# Patient Record
Sex: MALE | Race: WHITE | Employment: OTHER | URBAN - METROPOLITAN AREA
[De-identification: names, ages, dates, MRNs, and addresses within clinical notes are randomized per-mention and may not be internally consistent; named-entity substitution may affect disease eponyms.]

---

## 2018-07-03 ENCOUNTER — APPOINTMENT (OUTPATIENT)
Dept: GENERAL RADIOLOGY | Age: 68
End: 2018-07-03
Payer: MEDICARE

## 2018-07-03 ENCOUNTER — HOSPITAL ENCOUNTER (EMERGENCY)
Age: 68
Discharge: ANOTHER HOSPITAL | End: 2018-07-03
Attending: EMERGENCY MEDICINE
Payer: MEDICARE

## 2018-07-03 ENCOUNTER — HOSPITAL ENCOUNTER (INPATIENT)
Age: 68
LOS: 4 days | Discharge: HOME HEALTH | DRG: 039 | End: 2018-07-07
Attending: INTERNAL MEDICINE | Admitting: INTERNAL MEDICINE
Payer: MEDICARE

## 2018-07-03 ENCOUNTER — HOSPITAL ENCOUNTER (OUTPATIENT)
Age: 68
Discharge: HOME | End: 2018-07-03
Payer: MEDICARE

## 2018-07-03 ENCOUNTER — APPOINTMENT (OUTPATIENT)
Dept: CT IMAGING | Age: 68
End: 2018-07-03
Payer: MEDICARE

## 2018-07-03 VITALS
WEIGHT: 165 LBS | SYSTOLIC BLOOD PRESSURE: 119 MMHG | OXYGEN SATURATION: 96 % | BODY MASS INDEX: 26.52 KG/M2 | DIASTOLIC BLOOD PRESSURE: 69 MMHG | TEMPERATURE: 98.6 F | HEART RATE: 65 BPM | RESPIRATION RATE: 16 BRPM | HEIGHT: 66 IN

## 2018-07-03 DIAGNOSIS — G45.9 TRANSIENT CEREBRAL ISCHEMIA, UNSPECIFIED TYPE: Primary | ICD-10-CM

## 2018-07-03 DIAGNOSIS — I35.0 AORTIC VALVE STENOSIS, ETIOLOGY OF CARDIAC VALVE DISEASE UNSPECIFIED: ICD-10-CM

## 2018-07-03 LAB
ANION GAP SERPL CALCULATED.3IONS-SCNC: 11 MMOL/L (ref 7–16)
BUN BLDV-MCNC: 22 MG/DL (ref 8–23)
CALCIUM SERPL-MCNC: 9.4 MG/DL (ref 8.6–10.2)
CHLORIDE BLD-SCNC: 103 MMOL/L (ref 98–107)
CO2: 27 MMOL/L (ref 22–29)
CREAT SERPL-MCNC: 1.4 MG/DL (ref 0.7–1.2)
EKG ATRIAL RATE: 82 BPM
EKG P AXIS: 32 DEGREES
EKG P-R INTERVAL: 114 MS
EKG Q-T INTERVAL: 378 MS
EKG QRS DURATION: 92 MS
EKG QTC CALCULATION (BAZETT): 441 MS
EKG R AXIS: 72 DEGREES
EKG T AXIS: 60 DEGREES
EKG VENTRICULAR RATE: 82 BPM
GFR AFRICAN AMERICAN: >60
GFR NON-AFRICAN AMERICAN: 50 ML/MIN/1.73
GLUCOSE BLD-MCNC: 96 MG/DL (ref 74–109)
HCT VFR BLD CALC: 44.9 % (ref 37–54)
HEMOGLOBIN: 14.8 G/DL (ref 12.5–16.5)
MCH RBC QN AUTO: 30.3 PG (ref 26–35)
MCHC RBC AUTO-ENTMCNC: 33 % (ref 32–34.5)
MCV RBC AUTO: 92 FL (ref 80–99.9)
PDW BLD-RTO: 13.2 FL (ref 11.5–15)
PLATELET # BLD: 136 E9/L (ref 130–450)
PMV BLD AUTO: 11.9 FL (ref 7–12)
POTASSIUM SERPL-SCNC: 4 MMOL/L (ref 3.5–5)
PRO-BNP: 506 PG/ML (ref 0–125)
RBC # BLD: 4.88 E12/L (ref 3.8–5.8)
SODIUM BLD-SCNC: 141 MMOL/L (ref 132–146)
WBC # BLD: 9.4 E9/L (ref 4.5–11.5)

## 2018-07-03 PROCEDURE — 84100 ASSAY OF PHOSPHORUS: CPT

## 2018-07-03 PROCEDURE — 71045 X-RAY EXAM CHEST 1 VIEW: CPT

## 2018-07-03 PROCEDURE — 83036 HEMOGLOBIN GLYCOSYLATED A1C: CPT

## 2018-07-03 PROCEDURE — 85027 COMPLETE CBC AUTOMATED: CPT

## 2018-07-03 PROCEDURE — 70450 CT HEAD/BRAIN W/O DYE: CPT

## 2018-07-03 PROCEDURE — 84439 ASSAY OF FREE THYROXINE: CPT

## 2018-07-03 PROCEDURE — 83880 ASSAY OF NATRIURETIC PEPTIDE: CPT

## 2018-07-03 PROCEDURE — 84484 ASSAY OF TROPONIN QUANT: CPT

## 2018-07-03 PROCEDURE — 99285 EMERGENCY DEPT VISIT HI MDM: CPT

## 2018-07-03 PROCEDURE — 80048 BASIC METABOLIC PNL TOTAL CA: CPT

## 2018-07-03 PROCEDURE — 82746 ASSAY OF FOLIC ACID SERUM: CPT

## 2018-07-03 PROCEDURE — A0426 ALS 1: HCPCS

## 2018-07-03 PROCEDURE — 2060000000 HC ICU INTERMEDIATE R&B

## 2018-07-03 PROCEDURE — 36415 COLL VENOUS BLD VENIPUNCTURE: CPT

## 2018-07-03 PROCEDURE — 93005 ELECTROCARDIOGRAM TRACING: CPT | Performed by: EMERGENCY MEDICINE

## 2018-07-03 PROCEDURE — A0425 GROUND MILEAGE: HCPCS

## 2018-07-03 PROCEDURE — 82607 VITAMIN B-12: CPT

## 2018-07-03 RX ORDER — AMLODIPINE BESYLATE 5 MG/1
1 TABLET ORAL DAILY
Status: ON HOLD | COMMUNITY
Start: 2018-06-15 | End: 2018-07-07 | Stop reason: HOSPADM

## 2018-07-03 RX ORDER — ASPIRIN 81 MG/1
81 TABLET, CHEWABLE ORAL DAILY
Status: DISCONTINUED | OUTPATIENT
Start: 2018-07-04 | End: 2018-07-07 | Stop reason: HOSPADM

## 2018-07-03 RX ORDER — PRAVASTATIN SODIUM 40 MG
1 TABLET ORAL DAILY
Status: ON HOLD | COMMUNITY
Start: 2018-06-21 | End: 2018-07-07 | Stop reason: HOSPADM

## 2018-07-03 RX ORDER — RAMIPRIL 5 MG/1
10 CAPSULE ORAL DAILY
Status: DISCONTINUED | OUTPATIENT
Start: 2018-07-04 | End: 2018-07-07 | Stop reason: HOSPADM

## 2018-07-03 RX ORDER — RAMIPRIL 10 MG/1
1 CAPSULE ORAL DAILY
COMMUNITY
Start: 2018-04-10 | End: 2019-12-09 | Stop reason: SDUPTHER

## 2018-07-03 RX ORDER — ONDANSETRON 2 MG/ML
4 INJECTION INTRAMUSCULAR; INTRAVENOUS EVERY 6 HOURS PRN
Status: DISCONTINUED | OUTPATIENT
Start: 2018-07-03 | End: 2018-07-07 | Stop reason: HOSPADM

## 2018-07-03 RX ORDER — PANTOPRAZOLE SODIUM 40 MG/1
40 TABLET, DELAYED RELEASE ORAL
Status: DISCONTINUED | OUTPATIENT
Start: 2018-07-04 | End: 2018-07-07 | Stop reason: HOSPADM

## 2018-07-03 RX ORDER — ASPIRIN 325 MG
325 TABLET ORAL DAILY
COMMUNITY
End: 2020-07-22

## 2018-07-03 RX ORDER — METOPROLOL SUCCINATE 50 MG/1
1 TABLET, EXTENDED RELEASE ORAL 2 TIMES DAILY
COMMUNITY
Start: 2018-04-10 | End: 2019-10-03 | Stop reason: SDUPTHER

## 2018-07-03 RX ORDER — AMLODIPINE BESYLATE 10 MG/1
10 TABLET ORAL DAILY
Status: DISCONTINUED | OUTPATIENT
Start: 2018-07-04 | End: 2018-07-07 | Stop reason: HOSPADM

## 2018-07-03 RX ORDER — HYDRALAZINE HYDROCHLORIDE 20 MG/ML
5 INJECTION INTRAMUSCULAR; INTRAVENOUS EVERY 6 HOURS PRN
Status: DISCONTINUED | OUTPATIENT
Start: 2018-07-03 | End: 2018-07-07 | Stop reason: HOSPADM

## 2018-07-03 RX ORDER — NITROGLYCERIN 0.4 MG/1
0.4 TABLET SUBLINGUAL EVERY 5 MIN PRN
Status: DISCONTINUED | OUTPATIENT
Start: 2018-07-03 | End: 2018-07-07 | Stop reason: HOSPADM

## 2018-07-03 RX ORDER — PRAVASTATIN SODIUM 20 MG
40 TABLET ORAL DAILY
Status: DISCONTINUED | OUTPATIENT
Start: 2018-07-04 | End: 2018-07-04

## 2018-07-03 RX ORDER — METOPROLOL SUCCINATE 50 MG/1
50 TABLET, EXTENDED RELEASE ORAL 2 TIMES DAILY
Status: DISCONTINUED | OUTPATIENT
Start: 2018-07-03 | End: 2018-07-07 | Stop reason: HOSPADM

## 2018-07-03 ASSESSMENT — PAIN SCALES - GENERAL
PAINLEVEL_OUTOF10: 0
PAINLEVEL_OUTOF10: 0

## 2018-07-03 NOTE — PROGRESS NOTES
[address] @ 0852, per Dr. Mary Crowell. Request transfer to [address] for TIA and Aortic Stenosis. Requested Medicine. Dr. Lindsey Queen called via  OF THE [facility] @ 5487. Dr. Mary Crowell spoke to him @ this time.

## 2018-07-04 ENCOUNTER — APPOINTMENT (OUTPATIENT)
Dept: ULTRASOUND IMAGING | Age: 68
DRG: 039 | End: 2018-07-04
Attending: INTERNAL MEDICINE
Payer: MEDICARE

## 2018-07-04 LAB
ALBUMIN SERPL-MCNC: 3.7 G/DL (ref 3.5–5.2)
ALP BLD-CCNC: 73 U/L (ref 40–129)
ALT SERPL-CCNC: 11 U/L (ref 0–40)
ANION GAP SERPL CALCULATED.3IONS-SCNC: 14 MMOL/L (ref 7–16)
AST SERPL-CCNC: 15 U/L (ref 0–39)
BASOPHILS ABSOLUTE: 0.04 E9/L (ref 0–0.2)
BASOPHILS RELATIVE PERCENT: 0.5 % (ref 0–2)
BILIRUB SERPL-MCNC: 0.6 MG/DL (ref 0–1.2)
BUN BLDV-MCNC: 24 MG/DL (ref 8–23)
CALCIUM SERPL-MCNC: 9.1 MG/DL (ref 8.6–10.2)
CHLORIDE BLD-SCNC: 104 MMOL/L (ref 98–107)
CHOLESTEROL, TOTAL: 167 MG/DL (ref 0–199)
CO2: 24 MMOL/L (ref 22–29)
CREAT SERPL-MCNC: 1.4 MG/DL (ref 0.7–1.2)
EOSINOPHILS ABSOLUTE: 0.13 E9/L (ref 0.05–0.5)
EOSINOPHILS RELATIVE PERCENT: 1.6 % (ref 0–6)
FOLATE: 6.1 NG/ML (ref 4.8–24.2)
GFR AFRICAN AMERICAN: >60
GFR NON-AFRICAN AMERICAN: 50 ML/MIN/1.73
GLUCOSE BLD-MCNC: 94 MG/DL (ref 74–109)
HBA1C MFR BLD: 5.4 % (ref 4–5.6)
HCT VFR BLD CALC: 43.9 % (ref 37–54)
HDLC SERPL-MCNC: 51 MG/DL
HEMOGLOBIN: 14.6 G/DL (ref 12.5–16.5)
IMMATURE GRANULOCYTES #: 0.08 E9/L
IMMATURE GRANULOCYTES %: 1 % (ref 0–5)
LDL CHOLESTEROL CALCULATED: 96 MG/DL (ref 0–99)
LYMPHOCYTES ABSOLUTE: 1.6 E9/L (ref 1.5–4)
LYMPHOCYTES RELATIVE PERCENT: 19.5 % (ref 20–42)
MAGNESIUM: 2.2 MG/DL (ref 1.6–2.6)
MCH RBC QN AUTO: 30 PG (ref 26–35)
MCHC RBC AUTO-ENTMCNC: 33.3 % (ref 32–34.5)
MCV RBC AUTO: 90.1 FL (ref 80–99.9)
MONOCYTES ABSOLUTE: 0.58 E9/L (ref 0.1–0.95)
MONOCYTES RELATIVE PERCENT: 7.1 % (ref 2–12)
NEUTROPHILS ABSOLUTE: 5.76 E9/L (ref 1.8–7.3)
NEUTROPHILS RELATIVE PERCENT: 70.3 % (ref 43–80)
PDW BLD-RTO: 13 FL (ref 11.5–15)
PHOSPHORUS: 3.9 MG/DL (ref 2.5–4.5)
PLATELET # BLD: 123 E9/L (ref 130–450)
PMV BLD AUTO: 11.5 FL (ref 7–12)
POTASSIUM SERPL-SCNC: 4.5 MMOL/L (ref 3.5–5)
PRO-BNP: 585 PG/ML (ref 0–125)
RBC # BLD: 4.87 E12/L (ref 3.8–5.8)
SODIUM BLD-SCNC: 142 MMOL/L (ref 132–146)
T4 FREE: 1.05 NG/DL (ref 0.93–1.7)
TOTAL PROTEIN: 6.8 G/DL (ref 6.4–8.3)
TRIGL SERPL-MCNC: 99 MG/DL (ref 0–149)
TROPONIN: <0.01 NG/ML (ref 0–0.03)
TROPONIN: <0.01 NG/ML (ref 0–0.03)
TSH SERPL DL<=0.05 MIU/L-ACNC: 2.54 UIU/ML (ref 0.27–4.2)
VITAMIN B-12: 185 PG/ML (ref 211–946)
VLDLC SERPL CALC-MCNC: 20 MG/DL
WBC # BLD: 8.2 E9/L (ref 4.5–11.5)

## 2018-07-04 PROCEDURE — 85025 COMPLETE CBC W/AUTO DIFF WBC: CPT

## 2018-07-04 PROCEDURE — 83735 ASSAY OF MAGNESIUM: CPT

## 2018-07-04 PROCEDURE — 83880 ASSAY OF NATRIURETIC PEPTIDE: CPT

## 2018-07-04 PROCEDURE — G8988 SELF CARE GOAL STATUS: HCPCS

## 2018-07-04 PROCEDURE — 99221 1ST HOSP IP/OBS SF/LOW 40: CPT | Performed by: PSYCHIATRY & NEUROLOGY

## 2018-07-04 PROCEDURE — 6370000000 HC RX 637 (ALT 250 FOR IP): Performed by: INTERNAL MEDICINE

## 2018-07-04 PROCEDURE — 36415 COLL VENOUS BLD VENIPUNCTURE: CPT

## 2018-07-04 PROCEDURE — 2060000000 HC ICU INTERMEDIATE R&B

## 2018-07-04 PROCEDURE — 6360000002 HC RX W HCPCS: Performed by: INTERNAL MEDICINE

## 2018-07-04 PROCEDURE — 80053 COMPREHEN METABOLIC PANEL: CPT

## 2018-07-04 PROCEDURE — 97165 OT EVAL LOW COMPLEX 30 MIN: CPT

## 2018-07-04 PROCEDURE — G8987 SELF CARE CURRENT STATUS: HCPCS

## 2018-07-04 PROCEDURE — 93880 EXTRACRANIAL BILAT STUDY: CPT

## 2018-07-04 PROCEDURE — 80061 LIPID PANEL: CPT

## 2018-07-04 PROCEDURE — 84443 ASSAY THYROID STIM HORMONE: CPT

## 2018-07-04 PROCEDURE — 84484 ASSAY OF TROPONIN QUANT: CPT

## 2018-07-04 RX ORDER — FOLIC ACID 1 MG/1
1 TABLET ORAL DAILY
Status: DISCONTINUED | OUTPATIENT
Start: 2018-07-04 | End: 2018-07-07 | Stop reason: HOSPADM

## 2018-07-04 RX ORDER — ATORVASTATIN CALCIUM 40 MG/1
80 TABLET, FILM COATED ORAL NIGHTLY
Status: DISCONTINUED | OUTPATIENT
Start: 2018-07-04 | End: 2018-07-07 | Stop reason: HOSPADM

## 2018-07-04 RX ORDER — CYANOCOBALAMIN 1000 UG/ML
1000 INJECTION INTRAMUSCULAR; SUBCUTANEOUS
Status: DISCONTINUED | OUTPATIENT
Start: 2018-07-04 | End: 2018-07-07 | Stop reason: HOSPADM

## 2018-07-04 RX ORDER — LORAZEPAM 2 MG/ML
1 INJECTION INTRAMUSCULAR ONCE
Status: DISCONTINUED | OUTPATIENT
Start: 2018-07-04 | End: 2018-07-07

## 2018-07-04 RX ORDER — CLOPIDOGREL BISULFATE 75 MG/1
75 TABLET ORAL DAILY
Status: DISCONTINUED | OUTPATIENT
Start: 2018-07-04 | End: 2018-07-05

## 2018-07-04 RX ADMIN — FOLIC ACID 1 MG: 1 TABLET ORAL at 12:42

## 2018-07-04 RX ADMIN — RAMIPRIL 10 MG: 5 CAPSULE ORAL at 09:00

## 2018-07-04 RX ADMIN — AMLODIPINE BESYLATE 10 MG: 10 TABLET ORAL at 09:00

## 2018-07-04 RX ADMIN — ASPIRIN 81 MG 81 MG: 81 TABLET ORAL at 09:01

## 2018-07-04 RX ADMIN — DESMOPRESSIN ACETATE 80 MG: 0.2 TABLET ORAL at 20:31

## 2018-07-04 RX ADMIN — CYANOCOBALAMIN 1000 MCG: 1000 INJECTION, SOLUTION INTRAMUSCULAR at 18:05

## 2018-07-04 RX ADMIN — CLOPIDOGREL BISULFATE 75 MG: 75 TABLET ORAL at 12:42

## 2018-07-04 RX ADMIN — METOPROLOL SUCCINATE 50 MG: 50 TABLET, FILM COATED, EXTENDED RELEASE ORAL at 09:00

## 2018-07-04 RX ADMIN — PRAVASTATIN SODIUM 40 MG: 20 TABLET ORAL at 09:00

## 2018-07-04 RX ADMIN — METOPROLOL SUCCINATE 50 MG: 50 TABLET, FILM COATED, EXTENDED RELEASE ORAL at 00:08

## 2018-07-04 RX ADMIN — PANTOPRAZOLE SODIUM 40 MG: 40 TABLET, DELAYED RELEASE ORAL at 06:23

## 2018-07-04 RX ADMIN — METOPROLOL SUCCINATE 50 MG: 50 TABLET, FILM COATED, EXTENDED RELEASE ORAL at 20:31

## 2018-07-04 ASSESSMENT — PAIN SCALES - GENERAL
PAINLEVEL_OUTOF10: 0

## 2018-07-04 NOTE — H&P
age 51, pulmonary embolus. Father   age 61 of a CVA. MEDICATIONS AT THE TIME OF ADMISSION:  Include amlodipine, aspirin, Toprol,  pravastatin, and Altace. REVIEW OF SYSTEMS:  The abovementioned history of coronary disease,  osteoarthritis, elevated lipids, hypertension. Denies any recurring  breathing difficulties, emphysema, COPD, and bronchitis. Denies any chest  pain or palpitations. Denies any recurring epigastric pains, gastritis,  ulcers, gallbladder difficulties. Denies any blood in stool, blood in  urine. Mild arthritic difficulties as listed. PHYSICAL EXAMINATION:  GENERAL:  A 57-year-old male sitting on the side of the bed. No acute  distress. VITAL SIGNS:  Temperature is 98.5, pulse 74, respirations 18, blood  pressure 110/68. HEENT:  Pupils are equally round and reactive to light. Extraocular  muscles intact. Sclerae white. Conjunctivae pink. Ears, nose, throat,  negative for discharge, drainage, or deformity. NECK:  Supple, soft bruits on the left, equivocal bruits on the right. No  masses, megaly. Trachea midline. Thyroid negative. HEART:  Regular rate and rhythm. Grade 2/6 systolic murmur, best heard  second right intercostal space but also second left intercostal space. LUNGS:  Clear to auscultation bilaterally. ABDOMEN:  Soft, nontender. No masses, megaly, rebound, or guarding. Liver  and spleen negative. Bowel sounds are normal.  EXTREMITIES:  Peripheral pulses diminished. There is no cyanosis,  clubbing, deformity, or edema. He is able to move all four extremities  equally. There is no appreciable weakness. Ultrasound of carotids just completed reveals 0% to 49% right carotid, 90%  to 99% left carotid disease. LABORATORY DATA:  Sodium 142, potassium 4.5, chloride 104, CO2 of 24, BUN  24, creatinine 1.4, magnesium 2.2, glucose 94, calcium 9.1. Protein is  6.8. Albumin 3.7. Pro-BNP is slightly high at 568. LDL is 96.   TSH is  normal.  A1c is 5.4.  Hemoglobin is 14.6. White count 8.2. Platelet  slightly low at 123. B12 level is low at 185. Folic acid is barely normal  at 6.1. EKG sinus rhythm. ASSESSMENT:  1. TIA. 2.  Left carotid disease. 3.  B12 deficiency. 4.  Folate deficiency. 5.  Hypertension. 6.  Tobacco abuse. PLAN:  1. Neurology consult. 2.  DVT prophylaxis. 3.  MRI of brain to be done. 4.  Vascular consult. 5.  Cardiology consult and 2-D echo regarding aortic stenosis.         Genie Carreon DO    D: 07/04/2018 11:39:45       T: 07/04/2018 12:41:50     [email or web address]  Job#: [id number]     Doc#: [id number]    CC:

## 2018-07-04 NOTE — ED PROVIDER NOTES
HPI:  7/3/18, Time: 8:03 PM         Laura Denise is a 70 y.o. male presenting to the ED for an episode of dysarthria, beginning a few hours ago. The complaint has been intermittent, mild in severity, and worsened by nothing. Patient states that episode lasted less than 5 minutes. He denies any other associated complaints no visual changes difficulty swallowing no pain. The patient's wife did witness the event and stated that he clearly had dysarthria but did not notice any other focal neurologic deficit    ROS:   Pertinent positives and negatives are stated within HPI, all other systems reviewed and are negative  --------------------------------------------- PAST HISTORY ---------------------------------------------  Past Medical History:  has a past medical history of Hypertension. Past Surgical History:  has a past surgical history that includes Cardiac surgery. Social History:  reports that he has been smoking. He has never used smokeless tobacco. He reports that he does not drink alcohol or use drugs. Family History: family history is not on file. The patients home medications have been reviewed. Allergies: Patient has no known allergies. ---------------------------------------------------PHYSICAL EXAM--------------------------------------   Nursing note and vitals reviewed. Constitutional: He is oriented to person, place, and time. He appears well-developed and well-nourished. No distress. Head: Normocephalic and atraumatic. Nose: Nose normal.   Eyes: Conjunctivae and EOM are normal. Pupils are equal, round, and reactive to light. Throat:  Mucous membranes are moist.   Posterior pharynx is clear, no exudates. Neck: Normal range of motion. Neck supple. No JVD present. Lymphadenopathy: He has no cervical adenopathy. Cardiovascular: Normal rate, regular rhythm, normal heart sounds and intact distal pulses. Exam reveals no gallop and no friction rub.  4/6 systolic ejection murmur   Pulmonary/Chest: Effort normal and breath sounds normal. No respiratory distress. Hehas no wheezes. Abdominal: Soft. Bowel sounds are normal. No tenderness. He has no rebound and no guarding. Musculoskeletal: Normal range of motion. Right lower leg: He exhibits no tenderness and no swelling. Left lower leg: He exhibits no tenderness and no swelling. Neurological: He is alert and oriented to person, place, and time. He has normal strength. No cranial nerve deficit or sensory deficit. Skin: Skin is warm and dry. No rash noted. Psychiatric: He has a normal mood and affect. His behavior is normal.       -------------------------------------------------- RESULTS -------------------------------------------------  I have personally reviewed all laboratory and imaging results for this patient. Results are listed below.      LABS:  Results for orders placed or performed during the hospital encounter of 07/03/18   CBC   Result Value Ref Range    WBC 9.4 4.5 - 11.5 E9/L    RBC 4.88 3.80 - 5.80 E12/L    Hemoglobin 14.8 12.5 - 16.5 g/dL    Hematocrit 44.9 37.0 - 54.0 %    MCV 92.0 80.0 - 99.9 fL    MCH 30.3 26.0 - 35.0 pg    MCHC 33.0 32.0 - 34.5 %    RDW 13.2 11.5 - 15.0 fL    Platelets 564 467 - 718 E9/L    MPV 11.9 7.0 - 12.0 fL   Basic Metabolic Panel   Result Value Ref Range    Sodium 141 132 - 146 mmol/L    Potassium 4.0 3.5 - 5.0 mmol/L    Chloride 103 98 - 107 mmol/L    CO2 27 22 - 29 mmol/L    Anion Gap 11 7 - 16 mmol/L    Glucose 96 74 - 109 mg/dL    BUN 22 8 - 23 mg/dL    CREATININE 1.4 (H) 0.7 - 1.2 mg/dL    GFR Non-African American 50 >=60 mL/min/1.73    GFR African American >60     Calcium 9.4 8.6 - 10.2 mg/dL   Brain Natriuretic Peptide   Result Value Ref Range    Pro- (H) 0 - 125 pg/mL   EKG 12 Lead   Result Value Ref Range    Ventricular Rate 82 BPM    Atrial Rate 82 BPM    P-R Interval 114 ms    QRS Duration 92 ms    Q-T Interval 378 ms    QTc Calculation (Bazett) 441 provider has spoken with the patient and spouse/SO and discussed todays results, in addition to providing specific details for the plan of care and counseling regarding the diagnosis and prognosis. Questions are answered at this time and they are agreeable with the plan.       --------------------------------- IMPRESSION AND DISPOSITION ---------------------------------    IMPRESSION  1. Transient cerebral ischemia, unspecified type    2. Aortic valve stenosis, etiology of cardiac valve disease unspecified        DISPOSITION  Disposition: Transfer to [facility] to have further eval  Patient condition is serious        NOTE: This report was transcribed using voice recognition software.  Every effort was made to ensure accuracy; however, inadvertent computerized transcription errors may be present            Eliza Diggs MD  07/03/18 2007

## 2018-07-04 NOTE — CONSULTS
Labs:  CBC:   Recent Labs      18   1756  18   0636   WBC  9.4  8.2   HGB  14.8  14.6   PLT  136  123*     BMP:  Recent Labs      18   1756  18   0636   NA  141  142   K  4.0  4.5   CL  103  104   CO2  27  24   BUN  22  24*   CREATININE  1.4*  1.4*   GLUCOSE  96  94   LABGLOM  50  50     ABGs: No results found for: PH, PO2, PCO2  INR: No results for input(s): INR in the last 72 hours. PRO-BNP:   Lab Results   Component Value Date    PROBNP 585 (H) 2018    PROBNP 506 (H) 2018      Cardiac Injury Profile:   Recent Labs      18   2327  18   0636   TROPONINI  <0.01  <0.01      Lipid Profile:   Lab Results   Component Value Date    TRIG 99 2018    HDL 51 2018    LDLCALC 96 2018    CHOL 167 2018      Hemoglobin A1C: No components found for: HGBA1C   ECG:  See report    Radiology:  Ct Head Wo Contrast    Result Date: 7/3/2018  Patient MRN:  [id number] : 1950 Age: 70 years Gender: Male Order Date:  7/3/2018 5:15 PM EXAM: CT HEAD WO CONTRAST NUMBER OF IMAGES:  193 INDICATION:  Slurred speech, aphasia  COMPARISON: None TECHNIQUE: CT scan of the head was performed from the vertex through the posterior fossa. No  IV contrast was administered. Coronal and sagittal reconstructions were also reviewed. Low-dose CT  acquisition technique included one of following options; 1 . Automated exposure control, 2. Adjustment of MA and or KV according to patient's size or 3. Use of iterative reconstruction. FINDINGS: There is mild generalized cerebral atrophy with mild to moderate periventricular and deep subcortical white matter low-attenuation indicating chronic microvascular ischemic disease. Small foci of ill-defined low attenuation are seen within the centrum semiovale in the left frontal lobe and in the left frontal subcortical white matter suggesting remote lacunar infarcts. There is mild generalized cerebellar atrophy.  No intracerebral hemorrhage or extra-axial fluid collections are seen. There is no hydrocephalus. There is no mass effect, midline shift or sulcal effacement. There is moderate calcification of the carotid siphons. The bony calvarium appears intact. The visualized paranasal sinuses and mastoid air cells cells are clear. Note is made of a Roel cell on the left. CONCLUSION: 1. No acute intracranial abnormality seen. If an acute ischemic infarct is suspected, follow-up MRI with diffusion imaging is recommended for further evaluation. 2. Mild generalized cerebral atrophy with mild to moderate chronic microvascular ischemic changes involving the deep white matter zones and small old appearing lacunar infarcts involving the left frontal lobe white matter zones. 3. Mild generalized cerebellar atrophy. 4. Moderate calcification of the carotid siphons. Xr Chest Portable    Result Date: 7/3/2018  Patient MRN:  [id number] : 1950 Age: 70 years Gender: Male Order Date:  7/3/2018 6:00 PM EXAM: XR CHEST PORTABLE INDICATION:  Possible Stroke Possible Stroke COMPARISON: None NUMBER OF IMAGES:  1 FINDINGS:  AP upright portable view of the chest was obtained. No pulmonary consolidation, pleural effusion or pneumothorax is seen. The heart is mildly enlarged. There are median sternotomy sutures in place. No mediastinal or hilar enlargement is seen. The visible bony structures appear intact. CONCLUSION:  Mild cardiomegaly with evidence of previous median sternotomy. No evidence of an acute cardiopulmonary process. Us Carotid Artery Bilateral    Result Date: 2018  Patient MRN:  [id number] : 1950 Age: 70 years Gender: Male Order Date:  2018 8:00 AM EXAM: US CAROTID ARTERY BILATERAL NUMBER OF IMAGES:  68 INDICATION: Stroke COMPARISON: None FINDINGS: Velocities are elevated on the left ICA\CCA ratios are elevated on the left  The right vertebral artery doppler images demonstrate  antegrade flow.  The left vertebral artery doppler images

## 2018-07-04 NOTE — PLAN OF CARE
Problem: HEMODYNAMIC STATUS  Goal: Patient has stable vital signs and fluid balance    Intervention: Administer medications as ordered  Administered meds to pt without issues

## 2018-07-04 NOTE — PLAN OF CARE
Problem: Falls - Risk of:  Goal: Will remain free from falls  Will remain free from falls   Outcome: Met This Shift    Goal: Absence of physical injury  Absence of physical injury   Outcome: Met This Shift      Problem: HEMODYNAMIC STATUS  Goal: Patient has stable vital signs and fluid balance  Outcome: Met This Shift      Problem: ACTIVITY INTOLERANCE/IMPAIRED MOBILITY  Goal: Mobility/activity is maintained at optimum level for patient  Outcome: Met This Shift

## 2018-07-04 NOTE — PROGRESS NOTES
Occupational Therapy  OCCUPATIONAL THERAPY INITIAL EVALUATION      Date:2018  Patient Name: Mary Gonsalez  MRN: [id number]  : 1950  Room: [address] [place], [place] [address]    Evaluating OT: Pop Santos OTR/L [id number]  Recommended Adaptive Equipment:none  AM PAC Daily activity raw score:    Diagnosis: dysarthria  Past Medical History: HTN, CAD, HLD & hx CABG   Precautions:     Home Living: Pt lives wife in a 1-story with 3steps to enter and no rails; bed/bath on living level  Bathroom setup: tub-curtain  Equipment owned: none   Basement  with 10 steps and 1 hand rail  Comments: dog    Prior Level of Function: independent with ADLs independent with IADLs; using independent for ambulation. Driving: yes  Occupation: retired    Pain Level: pt c/o no pain with activity     Cognition: oriented x 3; follows 3step directions. Additional Comments: Pt is cognitively intact    Sensory:   Hearing: WFL  Vision: impaired   Glasses: yes [x] no [] reading []      UE Assessment:  Hand Dominance: Right [x]  Left []     ROM  Strength Additional Info:    BUE  BUE WFL 5/5 strength G  and G FMC/dexterity noted during ADL tasks             Sensation: WFL    Tone:  Grossly intact    Edema: none noted    Functional Assessment:   Assessment Status  Comments   Feeding  independent    Grooming  independent    Upper Body Dressing independent    Lower Body Dressing independent    Bathing independent    Toileting  NT    Bed Mobility  Supine to Sit: independent  Sit to Supine:independent    Functional Transfers independent    Functional Mobility independent      Sit balance: G  Stand balance: G  Endurance/Activity tolerance: G                              Comments: Upon arrival pt standing @ bed level. At end of session pt seated EOB with all devices within reach, all lines and tubes intact.       Treatment: Pt is functionally independent & has no further OT needs @ this time     Assessment of current deficits   Functional mobility []  ADLs

## 2018-07-05 ENCOUNTER — ANESTHESIA EVENT (OUTPATIENT)
Dept: OPERATING ROOM | Age: 68
DRG: 039 | End: 2018-07-05
Payer: MEDICARE

## 2018-07-05 PROBLEM — I65.22 SYMPTOMATIC STENOSIS OF LEFT CAROTID ARTERY WITHOUT INFARCTION: Status: ACTIVE | Noted: 2018-07-05

## 2018-07-05 LAB
ABO/RH: NORMAL
ANTIBODY SCREEN: NORMAL
LEFT VENTRICULAR EJECTION FRACTION MODE: NORMAL
LV EF: 63 %
LV EF: 63 %
LVEF MODALITY: NORMAL

## 2018-07-05 PROCEDURE — 99223 1ST HOSP IP/OBS HIGH 75: CPT | Performed by: SURGERY

## 2018-07-05 PROCEDURE — 93306 TTE W/DOPPLER COMPLETE: CPT

## 2018-07-05 PROCEDURE — 86900 BLOOD TYPING SEROLOGIC ABO: CPT

## 2018-07-05 PROCEDURE — 86901 BLOOD TYPING SEROLOGIC RH(D): CPT

## 2018-07-05 PROCEDURE — 6360000002 HC RX W HCPCS: Performed by: INTERNAL MEDICINE

## 2018-07-05 PROCEDURE — 6370000000 HC RX 637 (ALT 250 FOR IP): Performed by: INTERNAL MEDICINE

## 2018-07-05 PROCEDURE — 2060000000 HC ICU INTERMEDIATE R&B

## 2018-07-05 PROCEDURE — 86850 RBC ANTIBODY SCREEN: CPT

## 2018-07-05 PROCEDURE — 87088 URINE BACTERIA CULTURE: CPT

## 2018-07-05 PROCEDURE — 36415 COLL VENOUS BLD VENIPUNCTURE: CPT

## 2018-07-05 RX ADMIN — PANTOPRAZOLE SODIUM 40 MG: 40 TABLET, DELAYED RELEASE ORAL at 06:00

## 2018-07-05 RX ADMIN — ASPIRIN 81 MG 81 MG: 81 TABLET ORAL at 08:20

## 2018-07-05 RX ADMIN — METOPROLOL SUCCINATE 50 MG: 50 TABLET, FILM COATED, EXTENDED RELEASE ORAL at 08:20

## 2018-07-05 RX ADMIN — RAMIPRIL 10 MG: 5 CAPSULE ORAL at 08:20

## 2018-07-05 RX ADMIN — DESMOPRESSIN ACETATE 80 MG: 0.2 TABLET ORAL at 20:23

## 2018-07-05 RX ADMIN — METOPROLOL SUCCINATE 50 MG: 50 TABLET, FILM COATED, EXTENDED RELEASE ORAL at 20:23

## 2018-07-05 RX ADMIN — CYANOCOBALAMIN 1000 MCG: 1000 INJECTION, SOLUTION INTRAMUSCULAR at 18:17

## 2018-07-05 RX ADMIN — FOLIC ACID 1 MG: 1 TABLET ORAL at 08:20

## 2018-07-05 RX ADMIN — AMLODIPINE BESYLATE 10 MG: 10 TABLET ORAL at 08:20

## 2018-07-05 ASSESSMENT — PAIN SCALES - GENERAL
PAINLEVEL_OUTOF10: 0

## 2018-07-05 ASSESSMENT — LIFESTYLE VARIABLES: SMOKING_STATUS: 1

## 2018-07-05 NOTE — PROGRESS NOTES
guarding  Extremities: No edema, Peripheral pulses palpable  Musculoskeletal: Muscular strength appears intact. Neuro:  No focal motor defects ; II-XII grossly intact . PAIGE equally    TELEMETRY: REVIEWED--Telemetry: Sinus    ASSESSMENT:   Principal Problem:    Symptomatic stenosis of left carotid artery without infarction  Active Problems:    TIA (transient ischemic attack)    Vitamin B 12 deficiency    Folic acid deficiency    Hypertension    Hyperlipidemia    Tobacco dependence  Resolved Problems:    * No resolved hospital problems. *      PLAN:  SEE ORDERS      RE  CHANGES AND FINDINGS   Medications reviewed with patient  GI prophylaxis  DVT prophylaxis  Consultants notes reviewed  Await cardiology consult  Await results of 2-D echo  Carotid endarterectomy to be done tomorrow  Continue cyanocobalamin injection  Continue Folvite by mouth  Continue Lipitor 80 mg daily      Discussed with patient and nursing. Lul Xiao DO     12:41 PM     7/5/2018    TIME >35 MINUTES    >  50 %  OF  TIME  DISCUSSION     Active Hospital Problems    Diagnosis    Symptomatic stenosis of left carotid artery without infarction [I65.22]    Vitamin B 12 deficiency [Q62.9]    Folic acid deficiency [W08.0]    Hypertension [I10]    Hyperlipidemia [E78.5]    Tobacco dependence [F17.200]    TIA (transient ischemic attack) [G45.9]                 ------------  INFORMATION  -----------      DIET:DIET CARDIAC;   Diet NPO, After Midnight      No Known Allergies      MEDICATION SIDE EFFECTS:none       SCHEDULED MEDS:                                 Current Facility-Administered Medications   Medication Dose Route Frequency Provider Last Rate Last Dose    ceFAZolin (ANCEF) 2 g in dextrose 3 % 50 mL IVPB (duplex)  2 g Intravenous See Admin Instructions Hershel Boeck, MD        cyanocobalamin injection 1,000 mcg  1,000 mcg Intramuscular Dinner Mason Xiao DO   1,000 mcg at 51/22/34 1135    folic acid (FOLVITE) tablet 24 07/04/2018    LABALBU 3.7 07/04/2018    CREATININE 1.4 07/04/2018    CALCIUM 9.1 07/04/2018    GFRAA >60 07/04/2018    LABGLOM 50 07/04/2018    GLUCOSE 94 07/04/2018     Hepatic Function Panel:    Lab Results   Component Value Date    ALKPHOS 73 07/04/2018    ALT 11 07/04/2018    AST 15 07/04/2018    PROT 6.8 07/04/2018    BILITOT 0.6 07/04/2018    LABALBU 3.7 07/04/2018     Magnesium:    Lab Results   Component Value Date    MG 2.2 07/04/2018     Phosphorus:    Lab Results   Component Value Date    PHOS 3.9 07/03/2018     Uric Acid:  No results found for: LABURIC, URICACID  PT/INR:  No results found for: PROTIME, INR  Warfarin PT/INR:  No components found for: PTPATWAR, PTINRWAR  PTT:  No results found for: APTT, PTT[APTT}  Troponin:    Lab Results   Component Value Date    TROPONINI <0.01 07/04/2018     Last 3 Troponin:    Lab Results   Component Value Date    TROPONINI <0.01 07/04/2018    TROPONINI <0.01 07/03/2018     U/A:  No results found for: NITRITE, COLORU, PROTEINU, PHUR, LABCAST, WBCUA, RBCUA, MUCUS, TRICHOMONAS, YEAST, BACTERIA, CLARITYU, SPECGRAV, LEUKOCYTESUR, UROBILINOGEN, BILIRUBINUR, BLOODU, GLUCOSEU, AMORPHOUS  HgBA1c:    Lab Results   Component Value Date    LABA1C 5.4 07/03/2018     FLP:    Lab Results   Component Value Date    TRIG 99 07/04/2018    HDL 51 07/04/2018    LDLCALC 96 07/04/2018    LABVLDL 20 07/04/2018     TSH:    Lab Results   Component Value Date    TSH 2.540 07/04/2018     VITAMIN B12: No components found for: B12  FOLATE:    Lab Results   Component Value Date    FOLATE 6.1 07/03/2018        CBC:  Recent Labs      07/03/18   1756  07/04/18   0636   WBC  9.4  8.2   RBC  4.88  4.87   HGB  14.8  14.6   HCT  44.9  43.9   PLT  136  123*   MCV  92.0  90.1   MCH  30.3  30.0   MCHC  33.0  33.3   RDW  13.2  13.0   LYMPHOPCT   --   19.5*   MONOPCT   --   7.1   BASOPCT   --   0.5   MONOSABS   --   0.58   LYMPHSABS   --   1.60   EOSABS   --   0.13   BASOSABS   --   0.04          H & H [G45.9]         Yasmeen Xiao DO   12:41 PM     7/5/2018

## 2018-07-05 NOTE — ANESTHESIA PRE PROCEDURE
 pantoprazole (PROTONIX) tablet 40 mg  40 mg Oral QAM AC David Rodriguezsalvador, DO   40 mg at 07/05/18 0600    ondansetron (ZOFRAN) injection 4 mg  4 mg Intravenous Q6H PRN Sue Schaumann Mihok, DO        nitroGLYCERIN (NITROSTAT) SL tablet 0.4 mg  0.4 mg Sublingual Q5 Min PRN Sue Schaumann Mihok, DO        aspirin chewable tablet 81 mg  81 mg Oral Daily David Brianaleksey, DO   81 mg at 07/05/18 0820    hydrALAZINE (APRESOLINE) injection 5 mg  5 mg Intravenous Q6H PRN Sue Schaumann Mihok, DO           Allergies:  No Known Allergies    Problem List:    Patient Active Problem List   Diagnosis Code    TIA (transient ischemic attack) G45.9    Symptomatic stenosis of left carotid artery without infarction I65.22    Vitamin B 12 deficiency S28.3    Folic acid deficiency G93.8    Hypertension I10    Hyperlipidemia E78.5    Tobacco dependence F17.200       Past Medical History:        Diagnosis Date    Arthritis     CAD (coronary artery disease)     Folic acid deficiency     Hyperlipidemia     Hypertension     Symptomatic stenosis of left carotid artery without infarction 7/5/2018    Tobacco dependence     Vitamin B 12 deficiency        Past Surgical History:        Procedure Laterality Date    CARDIAC SURGERY      CORONARY ARTERY BYPASS GRAFT  2008       Social History:    Social History   Substance Use Topics    Smoking status: Current Every Day Smoker     Packs/day: 1.50     Years: 50.00     Types: Cigarettes     Start date: 1968    Smokeless tobacco: Never Used      Comment: H does not want to quit.      Alcohol use No                                Ready to quit: No  Counseling given: Not Answered      Vital Signs (Current):   Vitals:    07/04/18 1640 07/04/18 1945 07/04/18 2338 07/05/18 0800   BP: (!) 123/59 (!) 116/56 125/66 124/63   Pulse: 64 67 66 72   Resp: 18 20 20 18   Temp: 36.4 °C (97.5 °F) 37.1 °C (98.8 °F) 36.7 °C (98.1 °F) 37.1 °C (98.7 °F)   TempSrc: Temporal Temporal Oral Temporal   SpO2: 99% 96% 98% 97% Weight:       Height:                                                  BP Readings from Last 3 Encounters:   07/05/18 124/63   07/03/18 119/69       NPO Status:  instructed to be NPO at midnight                                                                               BMI:   Wt Readings from Last 3 Encounters:   07/03/18 157 lb 6.4 oz (71.4 kg)   07/03/18 165 lb (74.8 kg)     Body mass index is 25.41 kg/m². CBC:   Lab Results   Component Value Date    WBC 8.2 07/04/2018    RBC 4.87 07/04/2018    HGB 14.6 07/04/2018    HCT 43.9 07/04/2018    MCV 90.1 07/04/2018    RDW 13.0 07/04/2018     07/04/2018       CMP:   Lab Results   Component Value Date     07/04/2018    K 4.5 07/04/2018     07/04/2018    CO2 24 07/04/2018    BUN 24 07/04/2018    CREATININE 1.4 07/04/2018    GFRAA >60 07/04/2018    LABGLOM 50 07/04/2018    GLUCOSE 94 07/04/2018    PROT 6.8 07/04/2018    CALCIUM 9.1 07/04/2018    BILITOT 0.6 07/04/2018    ALKPHOS 73 07/04/2018    AST 15 07/04/2018    ALT 11 07/04/2018       POC Tests: No results for input(s): POCGLU, POCNA, POCK, POCCL, POCBUN, POCHEMO, POCHCT in the last 72 hours. Coags: No results found for: PROTIME, INR, APTT    HCG (If Applicable): No results found for: PREGTESTUR, PREGSERUM, HCG, HCGQUANT     ABGs: No results found for: PHART, PO2ART, PZM8FOP, HAK8CTB, BEART, [id number]     Type & Screen (If Applicable):  No results found for: LABABO, [address]    Anesthesia Evaluation  Patient summary reviewed and Nursing notes reviewed no history of anesthetic complications:   Airway: Mallampati: III  TM distance: >3 FB   Neck ROM: full  Mouth opening: > = 3 FB Dental:          Pulmonary: breath sounds clear to auscultation  (+) current smoker (1ppd)    (-) shortness of breath          Patient did not smoke on day of surgery.                  Cardiovascular:  Exercise tolerance: good (>4 METS),   (+) hypertension: no interval change and moderate, valvular problems/murmurs: AS, CAD: no interval change, CABG/stent (2008, x3):, hyperlipidemia      ECG reviewed  Rhythm: regular  Rate: normal  Echocardiogram reviewed         Beta Blocker:  Dose within 24 Hrs         Neuro/Psych:   (+) TIA,             GI/Hepatic/Renal: Neg GI/Hepatic/Renal ROS            Endo/Other:    (+) : arthritis (right shoulder):., .                  ROS comment: Vitamin B 12 deficiency Abdominal:         (-) obese Abdomen: soft. Vascular:   + PVD, aortic or cerebral, . ROS comment: Symptomatic stenosis of left carotid artery without infarction. 7/4/2018 8:00 AM EXAM: US CAROTID ARTERY BILATERAL  Atherosclerotic disease. No hemodynamically significant stenosis is identified Estimated stenosis by NASCET criteria in the proximal right carotid artery is between 0% and 49%. Estimated stenosis by NASCET criteria in the proximal left carotid artery is between 90% and 99%. Echo Complete W/ Dop & Color Flow. Procedure Date  Date: 07/05/2018  Summary   Left ventricle grossly normal in size. Normal LV segmental wall motion. Mild proximal septal thickening. Estimated left ventricular ejection fraction is 63±5%. <50% criteria for diastolic dysfunction. The LAESV Index is <34ml/m2. Normal right ventricular size. Right ventricular segmental wall motion is normal.   Physiologic and/or trace mitral regurgitation is present. Decreased aortic valve leaflet excursion. The aortic valve appears severely sclerotic. Mild aortic regurgitation is noted. Calcification of the right coronary cusp of aortic valve. Calcification of the non-coronary cusp of aortic valve. The mean gradient across the aortic valve is calculated as 36 mmHg by   doppler. Moderate to severe aortic stenosis is present. No evidence of tricuspid regurgitation. Unable to accurately assess RV systolic pressure. Physiologic and/or trace pulmonic regurgitation present. Technically fair quality study.    No comparison study available. Suggest clinical correlation. Anesthesia Plan      general     ASA 3       Induction: intravenous. BIS and arterial line  MIPS: Postoperative opioids intended and Prophylactic antiemetics administered. Anesthetic plan and risks discussed with patient. Use of blood products discussed with patient whom consented to blood products. Plan discussed with CRNA and attending. Federico Recio   7/5/2018      Pt seen, examined, chart reviewed, plan discussed. Adi Quarles  7/5/2018  2:11 PM    DOS STAFF ADDENDUM:    Pt seen and examined, chart reviewed (including anesthesia, drug and allergy history). Anesthetic plan, risks, benefits, alternatives, and personnel involved discussed with patient. Patient verbalized an understanding and agrees to proceed. Plan discussed with care team members and agreed upon.     Suleman Dillard MD  Staff Anesthesiologist  7:41 AM

## 2018-07-06 ENCOUNTER — ANESTHESIA (OUTPATIENT)
Dept: OPERATING ROOM | Age: 68
DRG: 039 | End: 2018-07-06
Payer: MEDICARE

## 2018-07-06 VITALS — TEMPERATURE: 96.3 F | OXYGEN SATURATION: 96 % | RESPIRATION RATE: 3 BRPM

## 2018-07-06 PROBLEM — I25.10 CAD (CORONARY ARTERY DISEASE): Status: ACTIVE | Noted: 2018-07-06

## 2018-07-06 PROBLEM — G45.9 TIA (TRANSIENT ISCHEMIC ATTACK): Status: RESOLVED | Noted: 2018-07-03 | Resolved: 2018-07-06

## 2018-07-06 PROBLEM — I35.0 AORTIC STENOSIS, SEVERE: Status: ACTIVE | Noted: 2018-07-06

## 2018-07-06 PROBLEM — I65.22 SYMPTOMATIC STENOSIS OF LEFT CAROTID ARTERY WITHOUT INFARCTION: Status: ACTIVE | Noted: 2018-07-05

## 2018-07-06 PROBLEM — I65.22 SYMPTOMATIC STENOSIS OF LEFT CAROTID ARTERY WITHOUT INFARCTION: Status: RESOLVED | Noted: 2018-07-05 | Resolved: 2018-07-06

## 2018-07-06 LAB
BASOPHILS ABSOLUTE: 0.05 E9/L (ref 0–0.2)
BASOPHILS RELATIVE PERCENT: 0.5 % (ref 0–2)
EOSINOPHILS ABSOLUTE: 0.04 E9/L (ref 0.05–0.5)
EOSINOPHILS RELATIVE PERCENT: 0.4 % (ref 0–6)
HCT VFR BLD CALC: 40.1 % (ref 37–54)
HEMOGLOBIN: 13.3 G/DL (ref 12.5–16.5)
IMMATURE GRANULOCYTES #: 0.14 E9/L
IMMATURE GRANULOCYTES %: 1.5 % (ref 0–5)
LYMPHOCYTES ABSOLUTE: 1.03 E9/L (ref 1.5–4)
LYMPHOCYTES RELATIVE PERCENT: 10.8 % (ref 20–42)
MCH RBC QN AUTO: 30 PG (ref 26–35)
MCHC RBC AUTO-ENTMCNC: 33.2 % (ref 32–34.5)
MCV RBC AUTO: 90.5 FL (ref 80–99.9)
MONOCYTES ABSOLUTE: 0.3 E9/L (ref 0.1–0.95)
MONOCYTES RELATIVE PERCENT: 3.1 % (ref 2–12)
NEUTROPHILS ABSOLUTE: 7.99 E9/L (ref 1.8–7.3)
NEUTROPHILS RELATIVE PERCENT: 83.7 % (ref 43–80)
PDW BLD-RTO: 12.8 FL (ref 11.5–15)
PLATELET # BLD: 104 E9/L (ref 130–450)
PMV BLD AUTO: 11.2 FL (ref 7–12)
RBC # BLD: 4.43 E12/L (ref 3.8–5.8)
WBC # BLD: 9.6 E9/L (ref 4.5–11.5)

## 2018-07-06 PROCEDURE — 3600000015 HC SURGERY LEVEL 5 ADDTL 15MIN: Performed by: SURGERY

## 2018-07-06 PROCEDURE — 2500000003 HC RX 250 WO HCPCS: Performed by: SURGERY

## 2018-07-06 PROCEDURE — 6360000002 HC RX W HCPCS: Performed by: SURGERY

## 2018-07-06 PROCEDURE — 88304 TISSUE EXAM BY PATHOLOGIST: CPT

## 2018-07-06 PROCEDURE — 6370000000 HC RX 637 (ALT 250 FOR IP): Performed by: INTERNAL MEDICINE

## 2018-07-06 PROCEDURE — 99232 SBSQ HOSP IP/OBS MODERATE 35: CPT | Performed by: PSYCHIATRY & NEUROLOGY

## 2018-07-06 PROCEDURE — 6360000002 HC RX W HCPCS

## 2018-07-06 PROCEDURE — 3600000005 HC SURGERY LEVEL 5 BASE: Performed by: SURGERY

## 2018-07-06 PROCEDURE — 2580000003 HC RX 258

## 2018-07-06 PROCEDURE — 36415 COLL VENOUS BLD VENIPUNCTURE: CPT

## 2018-07-06 PROCEDURE — 2580000003 HC RX 258: Performed by: SURGERY

## 2018-07-06 PROCEDURE — 3700000000 HC ANESTHESIA ATTENDED CARE: Performed by: SURGERY

## 2018-07-06 PROCEDURE — 03CJ0ZZ EXTIRPATION OF MATTER FROM LEFT COMMON CAROTID ARTERY, OPEN APPROACH: ICD-10-PCS | Performed by: SURGERY

## 2018-07-06 PROCEDURE — C9248 INJ, CLEVIDIPINE BUTYRATE: HCPCS | Performed by: SURGERY

## 2018-07-06 PROCEDURE — 2000000000 HC ICU R&B

## 2018-07-06 PROCEDURE — 88311 DECALCIFY TISSUE: CPT

## 2018-07-06 PROCEDURE — 35301 RECHANNELING OF ARTERY: CPT | Performed by: SURGERY

## 2018-07-06 PROCEDURE — C9248 INJ, CLEVIDIPINE BUTYRATE: HCPCS

## 2018-07-06 PROCEDURE — 85025 COMPLETE CBC W/AUTO DIFF WBC: CPT

## 2018-07-06 PROCEDURE — 2500000003 HC RX 250 WO HCPCS

## 2018-07-06 PROCEDURE — 3700000001 HC ADD 15 MINUTES (ANESTHESIA): Performed by: SURGERY

## 2018-07-06 RX ORDER — SODIUM CHLORIDE 9 MG/ML
INJECTION, SOLUTION INTRAVENOUS CONTINUOUS
Status: DISCONTINUED | OUTPATIENT
Start: 2018-07-06 | End: 2018-07-07

## 2018-07-06 RX ORDER — ACETAMINOPHEN 325 MG/1
650 TABLET ORAL EVERY 4 HOURS PRN
Status: DISCONTINUED | OUTPATIENT
Start: 2018-07-06 | End: 2018-07-07 | Stop reason: HOSPADM

## 2018-07-06 RX ORDER — LIDOCAINE HYDROCHLORIDE 20 MG/ML
INJECTION, SOLUTION INFILTRATION; PERINEURAL PRN
Status: DISCONTINUED | OUTPATIENT
Start: 2018-07-06 | End: 2018-07-06 | Stop reason: SDUPTHER

## 2018-07-06 RX ORDER — SODIUM CHLORIDE 9 MG/ML
INJECTION, SOLUTION INTRAVENOUS CONTINUOUS PRN
Status: DISCONTINUED | OUTPATIENT
Start: 2018-07-06 | End: 2018-07-06 | Stop reason: SDUPTHER

## 2018-07-06 RX ORDER — PROPOFOL 10 MG/ML
INJECTION, EMULSION INTRAVENOUS PRN
Status: DISCONTINUED | OUTPATIENT
Start: 2018-07-06 | End: 2018-07-06 | Stop reason: SDUPTHER

## 2018-07-06 RX ORDER — MEPERIDINE HYDROCHLORIDE 50 MG/ML
12.5 INJECTION INTRAMUSCULAR; INTRAVENOUS; SUBCUTANEOUS EVERY 5 MIN PRN
Status: DISCONTINUED | OUTPATIENT
Start: 2018-07-06 | End: 2018-07-06 | Stop reason: HOSPADM

## 2018-07-06 RX ORDER — BUPIVACAINE HYDROCHLORIDE 2.5 MG/ML
INJECTION, SOLUTION EPIDURAL; INFILTRATION; INTRACAUDAL PRN
Status: DISCONTINUED | OUTPATIENT
Start: 2018-07-06 | End: 2018-07-06 | Stop reason: HOSPADM

## 2018-07-06 RX ORDER — OXYCODONE HYDROCHLORIDE AND ACETAMINOPHEN 5; 325 MG/1; MG/1
2 TABLET ORAL EVERY 4 HOURS PRN
Status: DISCONTINUED | OUTPATIENT
Start: 2018-07-06 | End: 2018-07-07

## 2018-07-06 RX ORDER — MIDAZOLAM HYDROCHLORIDE 1 MG/ML
INJECTION INTRAMUSCULAR; INTRAVENOUS PRN
Status: DISCONTINUED | OUTPATIENT
Start: 2018-07-06 | End: 2018-07-06 | Stop reason: SDUPTHER

## 2018-07-06 RX ORDER — SODIUM CHLORIDE 0.9 % (FLUSH) 0.9 %
10 SYRINGE (ML) INJECTION PRN
Status: DISCONTINUED | OUTPATIENT
Start: 2018-07-06 | End: 2018-07-07 | Stop reason: HOSPADM

## 2018-07-06 RX ORDER — PROTAMINE SULFATE 10 MG/ML
INJECTION, SOLUTION INTRAVENOUS PRN
Status: DISCONTINUED | OUTPATIENT
Start: 2018-07-06 | End: 2018-07-06 | Stop reason: SDUPTHER

## 2018-07-06 RX ORDER — VECURONIUM BROMIDE 1 MG/ML
INJECTION, POWDER, LYOPHILIZED, FOR SOLUTION INTRAVENOUS PRN
Status: DISCONTINUED | OUTPATIENT
Start: 2018-07-06 | End: 2018-07-06 | Stop reason: SDUPTHER

## 2018-07-06 RX ORDER — MORPHINE SULFATE 2 MG/ML
2 INJECTION, SOLUTION INTRAMUSCULAR; INTRAVENOUS
Status: DISCONTINUED | OUTPATIENT
Start: 2018-07-06 | End: 2018-07-07

## 2018-07-06 RX ORDER — FENTANYL CITRATE 50 UG/ML
INJECTION, SOLUTION INTRAMUSCULAR; INTRAVENOUS PRN
Status: DISCONTINUED | OUTPATIENT
Start: 2018-07-06 | End: 2018-07-06 | Stop reason: SDUPTHER

## 2018-07-06 RX ORDER — DEXAMETHASONE SODIUM PHOSPHATE 10 MG/ML
INJECTION, SOLUTION INTRAMUSCULAR; INTRAVENOUS PRN
Status: DISCONTINUED | OUTPATIENT
Start: 2018-07-06 | End: 2018-07-06 | Stop reason: SDUPTHER

## 2018-07-06 RX ORDER — ONDANSETRON 2 MG/ML
INJECTION INTRAMUSCULAR; INTRAVENOUS PRN
Status: DISCONTINUED | OUTPATIENT
Start: 2018-07-06 | End: 2018-07-06 | Stop reason: SDUPTHER

## 2018-07-06 RX ORDER — MORPHINE SULFATE 4 MG/ML
4 INJECTION, SOLUTION INTRAMUSCULAR; INTRAVENOUS
Status: DISCONTINUED | OUTPATIENT
Start: 2018-07-06 | End: 2018-07-07

## 2018-07-06 RX ORDER — ONDANSETRON 2 MG/ML
4 INJECTION INTRAMUSCULAR; INTRAVENOUS
Status: DISCONTINUED | OUTPATIENT
Start: 2018-07-06 | End: 2018-07-06 | Stop reason: HOSPADM

## 2018-07-06 RX ORDER — NITROGLYCERIN 5 MG/ML
INJECTION, SOLUTION INTRAVENOUS PRN
Status: DISCONTINUED | OUTPATIENT
Start: 2018-07-06 | End: 2018-07-06 | Stop reason: SDUPTHER

## 2018-07-06 RX ORDER — OXYCODONE HYDROCHLORIDE AND ACETAMINOPHEN 5; 325 MG/1; MG/1
1 TABLET ORAL EVERY 4 HOURS PRN
Status: DISCONTINUED | OUTPATIENT
Start: 2018-07-06 | End: 2018-07-07

## 2018-07-06 RX ORDER — SODIUM CHLORIDE 0.9 % (FLUSH) 0.9 %
10 SYRINGE (ML) INJECTION EVERY 12 HOURS SCHEDULED
Status: DISCONTINUED | OUTPATIENT
Start: 2018-07-06 | End: 2018-07-07 | Stop reason: HOSPADM

## 2018-07-06 RX ORDER — SODIUM CHLORIDE 9 MG/ML
INJECTION, SOLUTION INTRAVENOUS CONTINUOUS PRN
Status: DISCONTINUED | OUTPATIENT
Start: 2018-07-06 | End: 2018-07-06

## 2018-07-06 RX ORDER — GLYCOPYRROLATE 1 MG/5 ML
SYRINGE (ML) INTRAVENOUS PRN
Status: DISCONTINUED | OUTPATIENT
Start: 2018-07-06 | End: 2018-07-06 | Stop reason: SDUPTHER

## 2018-07-06 RX ORDER — HEPARIN SODIUM 1000 [USP'U]/ML
INJECTION, SOLUTION INTRAVENOUS; SUBCUTANEOUS PRN
Status: DISCONTINUED | OUTPATIENT
Start: 2018-07-06 | End: 2018-07-06 | Stop reason: SDUPTHER

## 2018-07-06 RX ORDER — NEOSTIGMINE METHYLSULFATE 1 MG/ML
INJECTION, SOLUTION INTRAVENOUS PRN
Status: DISCONTINUED | OUTPATIENT
Start: 2018-07-06 | End: 2018-07-06 | Stop reason: SDUPTHER

## 2018-07-06 RX ADMIN — NITROGLYCERIN 50 MCG: 5 INJECTION, SOLUTION INTRAVENOUS at 09:35

## 2018-07-06 RX ADMIN — METOPROLOL SUCCINATE 50 MG: 50 TABLET, FILM COATED, EXTENDED RELEASE ORAL at 20:07

## 2018-07-06 RX ADMIN — LIDOCAINE HYDROCHLORIDE 50 MG: 20 INJECTION, SOLUTION INFILTRATION; PERINEURAL at 07:50

## 2018-07-06 RX ADMIN — MIDAZOLAM HYDROCHLORIDE 0.5 MG: 1 INJECTION, SOLUTION INTRAMUSCULAR; INTRAVENOUS at 07:42

## 2018-07-06 RX ADMIN — AMLODIPINE BESYLATE 10 MG: 10 TABLET ORAL at 16:06

## 2018-07-06 RX ADMIN — DEXAMETHASONE SODIUM PHOSPHATE 10 MG: 10 INJECTION, SOLUTION INTRAMUSCULAR; INTRAVENOUS at 07:55

## 2018-07-06 RX ADMIN — PROTAMINE SULFATE 30 MG: 10 INJECTION, SOLUTION INTRAVENOUS at 09:15

## 2018-07-06 RX ADMIN — Medication 0.4 MG: at 09:35

## 2018-07-06 RX ADMIN — PHENYLEPHRINE HYDROCHLORIDE 100 MCG: 10 INJECTION INTRAMUSCULAR; INTRAVENOUS; SUBCUTANEOUS at 08:10

## 2018-07-06 RX ADMIN — CEFAZOLIN SODIUM 2 G: 2 SOLUTION INTRAVENOUS at 08:02

## 2018-07-06 RX ADMIN — PHENYLEPHRINE HYDROCHLORIDE 100 MCG: 10 INJECTION INTRAMUSCULAR; INTRAVENOUS; SUBCUTANEOUS at 08:15

## 2018-07-06 RX ADMIN — FENTANYL CITRATE 100 MCG: 50 INJECTION, SOLUTION INTRAMUSCULAR; INTRAVENOUS at 07:50

## 2018-07-06 RX ADMIN — PHENYLEPHRINE HYDROCHLORIDE 100 MCG: 10 INJECTION INTRAMUSCULAR; INTRAVENOUS; SUBCUTANEOUS at 08:05

## 2018-07-06 RX ADMIN — SODIUM CHLORIDE: 9 INJECTION, SOLUTION INTRAVENOUS at 07:53

## 2018-07-06 RX ADMIN — Medication 2 MG: at 09:35

## 2018-07-06 RX ADMIN — RAMIPRIL 10 MG: 5 CAPSULE ORAL at 16:06

## 2018-07-06 RX ADMIN — DESMOPRESSIN ACETATE 80 MG: 0.2 TABLET ORAL at 20:06

## 2018-07-06 RX ADMIN — SODIUM CHLORIDE: 9 INJECTION, SOLUTION INTRAVENOUS at 10:08

## 2018-07-06 RX ADMIN — SODIUM CHLORIDE: 9 INJECTION, SOLUTION INTRAVENOUS at 16:02

## 2018-07-06 RX ADMIN — HEPARIN SODIUM 10000 UNITS: 1000 INJECTION, SOLUTION INTRAVENOUS; SUBCUTANEOUS at 08:25

## 2018-07-06 RX ADMIN — PHENYLEPHRINE HYDROCHLORIDE 100 MCG: 10 INJECTION INTRAMUSCULAR; INTRAVENOUS; SUBCUTANEOUS at 07:55

## 2018-07-06 RX ADMIN — VECURONIUM BROMIDE 10 MG: 10 INJECTION, POWDER, LYOPHILIZED, FOR SOLUTION INTRAVENOUS at 07:50

## 2018-07-06 RX ADMIN — CLEVIPIDINE 1 MG/HR: 0.5 EMULSION INTRAVENOUS at 10:08

## 2018-07-06 RX ADMIN — ONDANSETRON 4 MG: 2 INJECTION INTRAMUSCULAR; INTRAVENOUS at 09:20

## 2018-07-06 RX ADMIN — PROPOFOL 130 MG: 10 INJECTION, EMULSION INTRAVENOUS at 07:50

## 2018-07-06 RX ADMIN — CEFAZOLIN SODIUM 2 G: 2 SOLUTION INTRAVENOUS at 16:03

## 2018-07-06 RX ADMIN — SODIUM CHLORIDE: 9 INJECTION, SOLUTION INTRAVENOUS at 07:40

## 2018-07-06 RX ADMIN — ASPIRIN 81 MG 81 MG: 81 TABLET ORAL at 16:06

## 2018-07-06 ASSESSMENT — PULMONARY FUNCTION TESTS
PIF_VALUE: 25
PIF_VALUE: 26
PIF_VALUE: 25
PIF_VALUE: 0
PIF_VALUE: 24
PIF_VALUE: 25
PIF_VALUE: 25
PIF_VALUE: 26
PIF_VALUE: 25
PIF_VALUE: 26
PIF_VALUE: 29
PIF_VALUE: 25
PIF_VALUE: 24
PIF_VALUE: 21
PIF_VALUE: 25
PIF_VALUE: 21
PIF_VALUE: 23
PIF_VALUE: 25
PIF_VALUE: 24
PIF_VALUE: 25
PIF_VALUE: 2
PIF_VALUE: 0
PIF_VALUE: 23
PIF_VALUE: 25
PIF_VALUE: 26
PIF_VALUE: 25
PIF_VALUE: 1
PIF_VALUE: 15
PIF_VALUE: 25
PIF_VALUE: 24
PIF_VALUE: 25
PIF_VALUE: 24
PIF_VALUE: 25
PIF_VALUE: 26
PIF_VALUE: 25
PIF_VALUE: 26
PIF_VALUE: 28
PIF_VALUE: 1
PIF_VALUE: 25
PIF_VALUE: 25
PIF_VALUE: 26
PIF_VALUE: 25
PIF_VALUE: 25
PIF_VALUE: 24
PIF_VALUE: 29
PIF_VALUE: 23
PIF_VALUE: 24
PIF_VALUE: 25
PIF_VALUE: 26
PIF_VALUE: 24
PIF_VALUE: 24
PIF_VALUE: 23
PIF_VALUE: 25
PIF_VALUE: 25
PIF_VALUE: 27
PIF_VALUE: 0
PIF_VALUE: 25
PIF_VALUE: 0
PIF_VALUE: 0
PIF_VALUE: 27
PIF_VALUE: 26
PIF_VALUE: 24
PIF_VALUE: 28
PIF_VALUE: 26
PIF_VALUE: 24
PIF_VALUE: 25
PIF_VALUE: 25
PIF_VALUE: 24
PIF_VALUE: 25
PIF_VALUE: 28
PIF_VALUE: 25
PIF_VALUE: 24
PIF_VALUE: 27
PIF_VALUE: 25
PIF_VALUE: 24
PIF_VALUE: 25
PIF_VALUE: 24
PIF_VALUE: 26
PIF_VALUE: 25
PIF_VALUE: 25
PIF_VALUE: 24
PIF_VALUE: 25
PIF_VALUE: 26
PIF_VALUE: 25
PIF_VALUE: 6
PIF_VALUE: 25
PIF_VALUE: 26
PIF_VALUE: 25
PIF_VALUE: 27
PIF_VALUE: 25
PIF_VALUE: 24
PIF_VALUE: 25
PIF_VALUE: 26
PIF_VALUE: 25
PIF_VALUE: 26
PIF_VALUE: 0
PIF_VALUE: 25
PIF_VALUE: 0
PIF_VALUE: 27
PIF_VALUE: 25
PIF_VALUE: 8
PIF_VALUE: 26
PIF_VALUE: 1
PIF_VALUE: 25
PIF_VALUE: 23

## 2018-07-06 ASSESSMENT — ENCOUNTER SYMPTOMS: SHORTNESS OF BREATH: 0

## 2018-07-06 ASSESSMENT — PAIN - FUNCTIONAL ASSESSMENT: PAIN_FUNCTIONAL_ASSESSMENT: 0-10

## 2018-07-06 ASSESSMENT — PAIN SCALES - GENERAL
PAINLEVEL_OUTOF10: 0

## 2018-07-06 NOTE — PROGRESS NOTES
BP (!) 145/60   Pulse 81   Temp 97.9 °F (36.6 °C) (Tympanic)   Resp 22   Ht 5' 6\" (1.676 m)   Wt 157 lb (71.2 kg)   SpO2 93%   BMI 25.34 kg/m²   Patient Vitals for the past 96 hrs (Last 3 readings):   Weight   07/06/18 0727 157 lb (71.2 kg)   07/03/18 2141 157 lb 6.4 oz (71.4 kg)     OBJECTIVE:    HEENT: PERRL, EOM  Intact; sclera non-icteric, conjunctiva pink. Carotids are brisk in upstroke with normal contour. No carotid bruits. Normal jugular venous pulsation at 45°. No palpable cervical nor supraclavicular nodes. Thyroid not palpable. Trachea midline. Chest: Even excursion  Lungs: CTA B, no expiratory wheezes or rhonchi, no decreased tactile fremitus without inspiratory rales. Heart: Regular  rhythm; S1 > S2, no gallop or murmur. No clicks, rub, palpable thrills   or heaves. PMI nondisplaced, 5th intercostal space MCL. Abdomen: Soft, nontender, nondistended,  {Blank single:85001::\"scaphoid\",\"mildly protuberant\",\"moderately protuberant\",\"grossly protuberant\"}, no masses or organomegaly. Bowel sounds active. Extremities: Without clubbing, cyanosis or edema. Pulses present 3+ upper extermities bilaterally; {POSITIVE-NEGATIVE PULSES:53816} DP and {POSITIVE-NEGATIVE PULSES:68421} PT bilaterally.      Data:   Scheduled Meds: Reviewed  Continuous Infusions:    sodium chloride 125 mL/hr at 07/06/18 1008    clevidipine Stopped (07/06/18 1024)       Intake/Output Summary (Last 24 hours) at 07/06/18 1428  Last data filed at 07/06/18 1200   Gross per 24 hour   Intake             2580 ml   Output              475 ml   Net             2105 ml     CBC:   Recent Labs      07/03/18   1756  07/04/18   0636  07/06/18   1012   WBC  9.4  8.2  9.6   HGB  14.8  14.6  13.3   HCT  44.9  43.9  40.1   PLT  136  123*  104*     BMP:  Recent Labs      07/03/18   1756  07/04/18   0636   NA  141  142   K  4.0  4.5   CL  103  104   CO2  27  24   BUN  22  24* CREATININE  1.4*  1.4*   LABGLOM  50  50     ABGs: No results found for: PH, PO2, PCO2  INR: No results for input(s): INR in the last 72 hours. PRO-BNP:   Lab Results   Component Value Date    PROBNP 585 (H) 2018    PROBNP 506 (H) 2018      TSH:   Lab Results   Component Value Date    TSH 2.540 2018      Cardiac Injury Profile: Recent Labs      18   2327  18   0636   TROPONINI  <0.01  <0.01      Lipid Profile: Lab Results   Component Value Date    TRIG 99 2018    HDL 51 2018    LDLCALC 96 2018    CHOL 167 2018      Hemoglobin A1C: No components found for: HGBA1C     RAD:   Ct Head Wo Contrast    Result Date: 7/3/2018  Patient MRN:  [id number] : 1950 Age: 70 years Gender: Male Order Date:  7/3/2018 5:15 PM EXAM: CT HEAD WO CONTRAST NUMBER OF IMAGES:  193 INDICATION:  Slurred speech, aphasia  COMPARISON: None TECHNIQUE: CT scan of the head was performed from the vertex through the posterior fossa. No  IV contrast was administered. Coronal and sagittal reconstructions were also reviewed. Low-dose CT  acquisition technique included one of following options; 1 . Automated exposure control, 2. Adjustment of MA and or KV according to patient's size or 3. Use of iterative reconstruction. FINDINGS: There is mild generalized cerebral atrophy with mild to moderate periventricular and deep subcortical white matter low-attenuation indicating chronic microvascular ischemic disease. Small foci of ill-defined low attenuation are seen within the centrum semiovale in the left frontal lobe and in the left frontal subcortical white matter suggesting remote lacunar infarcts. There is mild generalized cerebellar atrophy. No intracerebral hemorrhage or extra-axial fluid collections are seen. There is no hydrocephalus. There is no mass effect, midline shift or sulcal effacement. There is moderate calcification of the carotid siphons. The bony calvarium appears intact.  The visualized paranasal sinuses and mastoid air cells cells are clear. Note is made of a Roel cell on the left. CONCLUSION: 1. No acute intracranial abnormality seen. If an acute ischemic infarct is suspected, follow-up MRI with diffusion imaging is recommended for further evaluation. 2. Mild generalized cerebral atrophy with mild to moderate chronic microvascular ischemic changes involving the deep white matter zones and small old appearing lacunar infarcts involving the left frontal lobe white matter zones. 3. Mild generalized cerebellar atrophy. 4. Moderate calcification of the carotid siphons. Xr Chest Portable    Result Date: 7/3/2018  Patient MRN:  [id number] : 1950 Age: 70 years Gender: Male Order Date:  7/3/2018 6:00 PM EXAM: XR CHEST PORTABLE INDICATION:  Possible Stroke Possible Stroke COMPARISON: None NUMBER OF IMAGES:  1 FINDINGS:  AP upright portable view of the chest was obtained. No pulmonary consolidation, pleural effusion or pneumothorax is seen. The heart is mildly enlarged. There are median sternotomy sutures in place. No mediastinal or hilar enlargement is seen. The visible bony structures appear intact. CONCLUSION:  Mild cardiomegaly with evidence of previous median sternotomy. No evidence of an acute cardiopulmonary process. Us Carotid Artery Bilateral    Result Date: 2018  Patient MRN:  [id number] : 1950 Age: 70 years Gender: Male Order Date:  2018 8:00 AM EXAM: US CAROTID ARTERY BILATERAL NUMBER OF IMAGES:  68 INDICATION: Stroke COMPARISON: None FINDINGS: Velocities are elevated on the left ICA\CCA ratios are elevated on the left  The right vertebral artery doppler images demonstrate  antegrade flow. The left vertebral artery doppler images demonstrate  antegrade flow. Grey scale images demonstrate mild plaque identified in the right and left carotid arteries. Atherosclerotic disease.  No hemodynamically significant stenosis is identified Estimated stenosis by This report was transcribed using voice recognition software.   Every effort was made to ensure accuracy; however, inadvertent computerized transcription errors may be present

## 2018-07-06 NOTE — PLAN OF CARE
Problem: Falls - Risk of:  Goal: Will remain free from falls  Will remain free from falls   Outcome: Met This Shift    Goal: Absence of physical injury  Absence of physical injury   Outcome: Met This Shift      Problem: Infection:  Goal: Will remain free from infection  Will remain free from infection   Outcome: Met This Shift

## 2018-07-06 NOTE — ANESTHESIA POSTPROCEDURE EVALUATION
Department of Anesthesiology  Postprocedure Note    Patient: Bing Corona  MRN: [id number]  YOB: 1950  Date of evaluation: 7/6/2018  Time:  1:50 PM     Procedure Summary     Date:  07/06/18 Room / Location:  [facility] [place] OR 07 / [facility] OR    Anesthesia Start:  0742 Anesthesia Stop:  1007    Procedure:  LEFT CAROTID ENDARTERECTOMY (Left ) Diagnosis:  (.)    Surgeon:  Tyrell Dean MD Responsible Provider:  Jase Simons MD    Anesthesia Type:  general ASA Status:  3          Anesthesia Type: general    Isa Phase I:      Isa Phase II:      Last vitals: Reviewed and per EMR flowsheets.        Anesthesia Post Evaluation    Patient location during evaluation: PACU  Patient participation: complete - patient participated  Level of consciousness: awake and alert  Airway patency: patent  Nausea & Vomiting: no vomiting and no nausea  Complications: no  Cardiovascular status: hemodynamically stable  Respiratory status: acceptable  Hydration status: stable

## 2018-07-06 NOTE — PROGRESS NOTES
1300 - - - 81 22 93 % - -   07/06/18 1200 - - - 66 22 92 % - -   07/06/18 1100 - - - 68 23 90 % - -   07/06/18 1045 - - - 78 16 91 % - -   07/06/18 1030 - - - 72 17 92 % - -   07/06/18 1024 - - - 74 25 91 % - -   07/06/18 1015 - - - 73 19 91 % - -   07/06/18 1005 (!) 145/60 97.9 °F (36.6 °C) Tympanic 76 16 91 % - -   07/06/18 1004 - 97.8 °F (36.6 °C) Temporal 78 - - - -   07/06/18 0727 120/62 - - 70 20 97 % 5' 6\" (1.676 m) 157 lb (71.2 kg)         Intake/Output Summary (Last 24 hours) at 07/06/18 1406  Last data filed at 07/06/18 1200   Gross per 24 hour   Intake             2580 ml   Output              475 ml   Net             2105 ml       I/O last 3 completed shifts:   In: 520 [P.O.:520]  Out: -     I/O this shift:  In: 2300 [I.V.:2300]  Out: 475 [Urine:375; Blood:100]    Wt Readings from Last 3 Encounters:   07/06/18 157 lb (71.2 kg)   07/03/18 165 lb (74.8 kg)       Labs:   CBC:   Lab Results   Component Value Date    WBC 9.6 07/06/2018    RBC 4.43 07/06/2018    HGB 13.3 07/06/2018    HCT 40.1 07/06/2018    MCV 90.5 07/06/2018    MCH 30.0 07/06/2018    MCHC 33.2 07/06/2018    RDW 12.8 07/06/2018     07/06/2018    MPV 11.2 07/06/2018     CBC with Differential:    Lab Results   Component Value Date    WBC 9.6 07/06/2018    RBC 4.43 07/06/2018    HGB 13.3 07/06/2018    HCT 40.1 07/06/2018     07/06/2018    MCV 90.5 07/06/2018    MCH 30.0 07/06/2018    MCHC 33.2 07/06/2018    RDW 12.8 07/06/2018    LYMPHOPCT 10.8 07/06/2018    MONOPCT 3.1 07/06/2018    BASOPCT 0.5 07/06/2018    MONOSABS 0.30 07/06/2018    LYMPHSABS 1.03 07/06/2018    EOSABS 0.04 07/06/2018    BASOSABS 0.05 07/06/2018     Hemoglobin/Hematocrit:    Lab Results   Component Value Date    HGB 13.3 07/06/2018    HCT 40.1 07/06/2018     CMP:    Lab Results   Component Value Date     07/04/2018    K 4.5 07/04/2018     07/04/2018    CO2 24 07/04/2018    BUN 24 07/04/2018    CREATININE 1.4 07/04/2018    GFRAA >60 07/04/2018 07/04/18   0636  07/06/18   1012   WBC  9.4  8.2  9.6   RBC  4.88  4.87  4.43   HGB  14.8  14.6  13.3   HCT  44.9  43.9  40.1   PLT  136  123*  104*   MCV  92.0  90.1  90.5   MCH  30.3  30.0  30.0   MCHC  33.0  33.3  33.2   RDW  13.2  13.0  12.8   LYMPHOPCT   --   19.5*  10.8*   MONOPCT   --   7.1  3.1   BASOPCT   --   0.5  0.5   MONOSABS   --   0.58  0.30   LYMPHSABS   --   1.60  1.03*   EOSABS   --   0.13  0.04*   BASOSABS   --   0.04  0.05          H & H :  Recent Labs      07/03/18   1756 07/04/18   0636  07/06/18   1012   HGB  14.8  14.6  13.3       TSH:  Recent Labs      07/04/18   0636   TSH  2.540       GLUCOSE:No results for input(s): POCGLU in the last 72 hours. CMP:  Recent Labs      07/03/18 1756 07/04/18   0636   NA  141  142   K  4.0  4.5   CL  103  104   CO2  27  24   BUN  22  24*   CREATININE  1.4*  1.4*   GFRAA  >60  >60   LABGLOM  50  50   GLUCOSE  96  94   PROT   --   6.8   LABALBU   --   3.7   CALCIUM  9.4  9.1   BILITOT   --   0.6   ALKPHOS   --   73   AST   --   15   ALT   --   11        BNP:No results found for: BNP    PROTIME/INR:No results for input(s): PROTIME, INR in the last 72 hours. CRP: No results for input(s): CRP in the last 72 hours. ESR:No results for input(s): SEDRATE in the last 72 hours. LIPASE , AMYLASE:  No results found for: LIPASE   No results found for: AMYLASE    ABGs: No results found for: PH, PO2, PCO2    CARDIAC:   Recent Labs      07/03/18   2327  07/04/18   0636   TROPONINI  <0.01  <0.01       Lipid Profile:   Lab Results   Component Value Date    TRIG 99 07/04/2018    HDL 51 07/04/2018    LDLCALC 96 07/04/2018    CHOL 167 07/04/2018        Lab Results   Component Value Date    LABA1C 5.4 07/03/2018            RADIOLOGY: REVIEWED AVAILABLE REPORT  US CAROTID ARTERY BILATERAL   Final Result   Atherosclerotic disease.  No hemodynamically significant stenosis is   identified   Estimated stenosis by NASCET criteria in the proximal right carotid   artery is between 0% and 49%. Estimated stenosis by NASCET criteria in the proximal left carotid   artery is between 90% and 99%.          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

## 2018-07-07 VITALS
RESPIRATION RATE: 21 BRPM | HEIGHT: 66 IN | TEMPERATURE: 98.2 F | HEART RATE: 80 BPM | SYSTOLIC BLOOD PRESSURE: 103 MMHG | DIASTOLIC BLOOD PRESSURE: 80 MMHG | WEIGHT: 157 LBS | OXYGEN SATURATION: 95 % | BODY MASS INDEX: 25.23 KG/M2

## 2018-07-07 LAB — URINE CULTURE, ROUTINE: NORMAL

## 2018-07-07 PROCEDURE — 6370000000 HC RX 637 (ALT 250 FOR IP): Performed by: SURGERY

## 2018-07-07 PROCEDURE — 6360000002 HC RX W HCPCS: Performed by: SURGERY

## 2018-07-07 PROCEDURE — 6370000000 HC RX 637 (ALT 250 FOR IP): Performed by: INTERNAL MEDICINE

## 2018-07-07 PROCEDURE — 2580000003 HC RX 258: Performed by: SURGERY

## 2018-07-07 RX ORDER — ATORVASTATIN CALCIUM 80 MG/1
80 TABLET, FILM COATED ORAL NIGHTLY
Qty: 30 TABLET | Refills: 3 | Status: SHIPPED | OUTPATIENT
Start: 2018-07-07 | End: 2019-12-09 | Stop reason: SDUPTHER

## 2018-07-07 RX ORDER — AMLODIPINE BESYLATE 10 MG/1
10 TABLET ORAL DAILY
Qty: 30 TABLET | Refills: 3 | Status: SHIPPED | OUTPATIENT
Start: 2018-07-08 | End: 2019-06-17 | Stop reason: SDUPTHER

## 2018-07-07 RX ORDER — OXYCODONE HYDROCHLORIDE AND ACETAMINOPHEN 5; 325 MG/1; MG/1
1 TABLET ORAL EVERY 4 HOURS PRN
Status: DISCONTINUED | OUTPATIENT
Start: 2018-07-07 | End: 2018-07-07 | Stop reason: HOSPADM

## 2018-07-07 RX ORDER — PANTOPRAZOLE SODIUM 40 MG/1
40 TABLET, DELAYED RELEASE ORAL
Qty: 30 TABLET | Refills: 3 | Status: SHIPPED | OUTPATIENT
Start: 2018-07-08 | End: 2019-06-17 | Stop reason: SDUPTHER

## 2018-07-07 RX ORDER — VITAMIN B COMPLEX
2500 TABLET ORAL DAILY
Qty: 100 TABLET | Refills: 2 | Status: SHIPPED | OUTPATIENT
Start: 2018-07-07 | End: 2020-07-22 | Stop reason: ALTCHOICE

## 2018-07-07 RX ORDER — FOLIC ACID 1 MG/1
1 TABLET ORAL DAILY
Qty: 30 TABLET | Refills: 3 | Status: SHIPPED | OUTPATIENT
Start: 2018-07-08 | End: 2020-07-22

## 2018-07-07 RX ADMIN — AMLODIPINE BESYLATE 10 MG: 10 TABLET ORAL at 08:16

## 2018-07-07 RX ADMIN — FOLIC ACID 1 MG: 1 TABLET ORAL at 08:16

## 2018-07-07 RX ADMIN — ACETAMINOPHEN 650 MG: 325 TABLET, FILM COATED ORAL at 02:11

## 2018-07-07 RX ADMIN — PANTOPRAZOLE SODIUM 40 MG: 40 TABLET, DELAYED RELEASE ORAL at 08:17

## 2018-07-07 RX ADMIN — CEFAZOLIN SODIUM 2 G: 2 SOLUTION INTRAVENOUS at 00:33

## 2018-07-07 RX ADMIN — ASPIRIN 81 MG 81 MG: 81 TABLET ORAL at 08:17

## 2018-07-07 RX ADMIN — Medication 10 ML: at 08:17

## 2018-07-07 RX ADMIN — RAMIPRIL 10 MG: 5 CAPSULE ORAL at 08:16

## 2018-07-07 RX ADMIN — METOPROLOL SUCCINATE 50 MG: 50 TABLET, FILM COATED, EXTENDED RELEASE ORAL at 08:16

## 2018-07-07 ASSESSMENT — PAIN SCALES - GENERAL
PAINLEVEL_OUTOF10: 5
PAINLEVEL_OUTOF10: 0

## 2018-07-07 NOTE — PROGRESS NOTES
Vascular Surgery Progress Note    Pt is being seen in f/u today regarding s/p L CEA    Subjective  Pt s/e. Feels well. Minimal pain. Wants to eat and go home. Current Medications:      oxyCODONE-acetaminophen **OR** [DISCONTINUED] oxyCODONE-acetaminophen, sodium chloride flush, acetaminophen, ondansetron, nitroGLYCERIN, hydrALAZINE    sodium chloride flush  10 mL Intravenous 2 times per day    cyanocobalamin  1,000 mcg Intramuscular Dinner    folic acid  1 mg Oral Daily    atorvastatin  80 mg Oral Nightly    amLODIPine  10 mg Oral Daily    metoprolol succinate  50 mg Oral BID    ramipril  10 mg Oral Daily    pantoprazole  40 mg Oral QAM AC    aspirin  81 mg Oral Daily        PHYSICAL EXAM:    /68   Pulse 73   Temp 98.1 °F (36.7 °C) (Temporal)   Resp 19   Ht 5' 6\" (1.676 m)   Wt 157 lb (71.2 kg)   SpO2 94%   BMI 25.34 kg/m²     Intake/Output Summary (Last 24 hours) at 07/07/18 0943  Last data filed at 07/07/18 0846   Gross per 24 hour   Intake          2980.93 ml   Output             2725 ml   Net           255.93 ml          Gen: awake, alert and oriented x3, no apparent distress  HEENT: L neck incision C/D/I with minimal edema  CVS: RRR  Resp: No increased work of breathing  Abd: Soft, non-tender, non-distended  Neuro: No focal deficits, cranial nerves grossly intact, PAIGE 5/5 strength throughout    LABS:    Lab Results   Component Value Date    WBC 9.6 07/06/2018    HGB 13.3 07/06/2018    HCT 40.1 07/06/2018     (L) 07/06/2018    K 4.5 07/04/2018    BUN 24 (H) 07/04/2018    CREATININE 1.4 (H) 07/04/2018       A/P  70 y.o. male POD#1 s/p L CEA for symptomatic stenosis    Remove arterial line and gomez  Regular diet, stop IVF  Out of bed  Ok for discharge today from Vascular perspective if ambulating and tolerating diet    Ruby Torrez MD  7/7/2018  9:46 AM     Agree. OK for discharge from vascular. OK for asa and Plavix if dual platelet therapy desired.   ASA alone OK from my standpoint. F/u 2 weeks.

## 2018-07-07 NOTE — CARE COORDINATION
MARIA VICTORIA Discharge Planning:    MARIA VICTORIA paged regarding discharge home today with [facility]. Patient is agreeable to [facility]. MARIA VICTORIA made referral. Patient can discharge home when medically stable.  Shruthi Dougherty

## 2018-07-07 NOTE — PROGRESS NOTES
07/07/18 0500 - - - 64 28 94 %         Intake/Output Summary (Last 24 hours) at 07/07/18 1252  Last data filed at 07/07/18 1200   Gross per 24 hour   Intake          2680.93 ml   Output             2675 ml   Net             5.93 ml       I/O last 3 completed shifts:   In: 4783.3 [I.V.:4783.3]  Out: 3090 [Urine:2275; Blood:100]    I/O this shift:  In: 197.6 [I.V.:197.6]  Out: 775 [Urine:775]    Wt Readings from Last 3 Encounters:   07/06/18 157 lb (71.2 kg)   07/03/18 165 lb (74.8 kg)       Labs:   CBC:   Lab Results   Component Value Date    WBC 9.6 07/06/2018    RBC 4.43 07/06/2018    HGB 13.3 07/06/2018    HCT 40.1 07/06/2018    MCV 90.5 07/06/2018    MCH 30.0 07/06/2018    MCHC 33.2 07/06/2018    RDW 12.8 07/06/2018     07/06/2018    MPV 11.2 07/06/2018     CBC with Differential:    Lab Results   Component Value Date    WBC 9.6 07/06/2018    RBC 4.43 07/06/2018    HGB 13.3 07/06/2018    HCT 40.1 07/06/2018     07/06/2018    MCV 90.5 07/06/2018    MCH 30.0 07/06/2018    MCHC 33.2 07/06/2018    RDW 12.8 07/06/2018    LYMPHOPCT 10.8 07/06/2018    MONOPCT 3.1 07/06/2018    BASOPCT 0.5 07/06/2018    MONOSABS 0.30 07/06/2018    LYMPHSABS 1.03 07/06/2018    EOSABS 0.04 07/06/2018    BASOSABS 0.05 07/06/2018     Hemoglobin/Hematocrit:    Lab Results   Component Value Date    HGB 13.3 07/06/2018    HCT 40.1 07/06/2018     CMP:    Lab Results   Component Value Date     07/04/2018    K 4.5 07/04/2018     07/04/2018    CO2 24 07/04/2018    BUN 24 07/04/2018    CREATININE 1.4 07/04/2018    GFRAA >60 07/04/2018    LABGLOM 50 07/04/2018    GLUCOSE 94 07/04/2018    PROT 6.8 07/04/2018    LABALBU 3.7 07/04/2018    CALCIUM 9.1 07/04/2018    BILITOT 0.6 07/04/2018    ALKPHOS 73 07/04/2018    AST 15 07/04/2018    ALT 11 07/04/2018     BMP:    Lab Results   Component Value Date     07/04/2018    K 4.5 07/04/2018     07/04/2018    CO2 24 07/04/2018    BUN 24 07/04/2018    LABALBU 3.7 07/04/2018

## 2018-07-07 NOTE — DISCHARGE SUMMARY
DISCHARGE SUMMARY  Patient ID:  Jayne Brady  [id number]  13 y.o.  1950    Admit date: 7/3/2018      Discharge date and time: 7/7/2018  1:32 PM    Admission Diagnoses: TIA (transient ischemic attack) [G45.9]  TIA (transient ischemic attack) [G45.9]    Discharge Diagnosis  Principal Problem:    Hemispheric carotid artery syndrome  Active Problems: Aortic stenosis, severe    CAD (coronary artery disease)    Vitamin B 12 deficiency    Folic acid deficiency    Hypertension    Hyperlipidemia    Tobacco dependence    Symptomatic stenosis of left carotid artery without infarction  Resolved Problems:    TIA (transient ischemic attack)    Symptomatic stenosis of left carotid artery without infarction      Hospital Course: Patient of Dr. Michel Aponte I am asked to follow.     CHIEF COMPLAINT:  Admitted with chief complaint of TIA.     HISTORY OF PRESENT ILLNESS:  A 65-year-old male who states he was sitting  at home yesterday with his wife having a simple conversation. He realized  suddenly he was slurring his words and his words were making no sense. He  had no other abnormalities, no weakness of extremities, etc.  The episode  lasted less than five minutes. There were no swallowing difficulties. The  above was witnessed by his wife. He presented to the [facility] ED. In the  ED, he underwent a CT of his brain which showed no acute intracranial  pathology. There was mild cerebral atrophy, microvascular disease, small  old lacunar infarcts, especially left frontal lobe and cerebellar atrophy. In addition, ER physician thought the patient had a severe aortic stenosis  murmur with radiation towards the neck. There are no Neurology services  available in [facility]. The patient was transferred here. I am asked to  admit and follow. Since the patient has been hospitalized, he has had  resolution of all the above, feeling much better at this time.     7/5/18-SUBJECTIVE: Jayne Brady is alert awake and cooperative; oriented ×3. Denies any chest pain dyspnea nausea emesis. Tolerating diet. No abdominal pain. Sitting on bed, eating lunch. Just returned from 2-D echo. Has been seen by vascular; they agree carotid endarterectomy should be done, tentatively scheduled for tomorrow. Awaiting cardiology consult regarding aortic stenosis. Extended discussion with patient regarding carotid occlusive disease; 90 and 95%; similar occlusive disease to what he had prior to his CABG.     7/6/18-patient has successful left carotid endarterectomy this morning; currently in intensive care bed feels fine. Alert and oriented ×3. Other than expected postop pain no other complaints. He has been seen by cardiology; 2-D echo does confirm moderate to severe aortic stenosis. Ejection fraction 63% plus or -5. Severe aortic sclerosis mild aortic regurgitation. Hemoglobin today 13.3 WBC 9.6 platelets 786,705. Urine culture growth not present.     7/7/18-patient sitting in bedside chair, awaiting discharge. Has been seen earlier today by vascular surgery and released. Having expected left neck discomfort. No chest pain no dyspnea.     Continue Lipitor 80 mg stopped Pravachol; all the above discussed with patient at length  B12 2500 µg sublingual  Continue Folvite  Increase amlodipine 10 mg daily  Toprol-XL 50 mg twice a day  Altace 10 mg daily  Med reconciliation completed  Follow-up PCP 1 week  Prescriptions written    Discharge Instructions: Medications reviewed with patient    Consults:cardiology, neurology and vascular surgery     Past Medical Hx :   Past Medical History:   Diagnosis Date    Aortic stenosis, severe 7/6/2018    Arthritis     CAD (coronary artery disease)     Folic acid deficiency     Hyperlipidemia     Hypertension     Symptomatic stenosis of left carotid artery without infarction 07/05/2018    Tobacco dependence     Vitamin B 12 deficiency        Past Surgical Hx :  Past Surgical History:   Procedure Laterality 07/04/2018    AST 15 07/04/2018    PROT 6.8 07/04/2018    BILITOT 0.6 07/04/2018    LABALBU 3.7 07/04/2018     Magnesium:    Lab Results   Component Value Date    MG 2.2 07/04/2018     Phosphorus:    Lab Results   Component Value Date    PHOS 3.9 07/03/2018     PT/INR:  No results found for: PROTIME, INR  Warfarin PT/INR:  No components found for: PTPATWAR, PTINRWAR  PTT:  No results found for: APTT, PTT[APTT}  Troponin:    Lab Results   Component Value Date    TROPONINI <0.01 07/04/2018     Last 3 Troponin:    Lab Results   Component Value Date    TROPONINI <0.01 07/04/2018    TROPONINI <0.01 07/03/2018     U/A:  No results found for: NITRITE, COLORU, PROTEINU, PHUR, LABCAST, WBCUA, RBCUA, MUCUS, TRICHOMONAS, YEAST, BACTERIA, CLARITYU, SPECGRAV, LEUKOCYTESUR, UROBILINOGEN, BILIRUBINUR, BLOODU, GLUCOSEU, AMORPHOUS  HgBA1c:    Lab Results   Component Value Date    LABA1C 5.4 07/03/2018     FLP:    Lab Results   Component Value Date    TRIG 99 07/04/2018    HDL 51 07/04/2018    LDLCALC 96 07/04/2018    LABVLDL 20 07/04/2018     TSH:    Lab Results   Component Value Date    TSH 2.540 07/04/2018     VITAMIN B12: No components found for: B12  FOLATE:    Lab Results   Component Value Date    FOLATE 6.1 07/03/2018          CBC:  Recent Labs      07/06/18   1012   WBC  9.6   RBC  4.43   HGB  13.3   HCT  40.1   PLT  104*   MCV  90.5   MCH  30.0   MCHC  33.2   RDW  12.8   LYMPHOPCT  10.8*   MONOPCT  3.1   BASOPCT  0.5   MONOSABS  0.30   LYMPHSABS  1.03*   EOSABS  0.04*   BASOSABS  0.05          H & H :  Recent Labs      07/06/18   1012   HGB  13.3       TSH:No results for input(s): TSH in the last 72 hours. GLUCOSE:No results for input(s): POCGLU in the last 72 hours. CMP:No results for input(s): NA, K, CL, CO2, BUN, CREATININE, GFRAA, AGRATIO, LABGLOM, GLUCOSE, PROT, LABALBU, CALCIUM, BILITOT, ALKPHOS, AST, ALT in the last 72 hours.     Invalid input(s): GLU      BNP:No results found for: BNP    PROTIME/INR:No results for input(s): PROTIME, INR in the last 72 hours. CRP: No results for input(s): CRP in the last 72 hours. ESR:No results for input(s): SEDRATE in the last 72 hours. LIPASE , AMYLASE:  No results found for: LIPASE   No results found for: AMYLASE    ABGs: No results found for: PHART, PO2ART, [id number]    CARDIAC: No results for input(s): CKTOTAL, CKMB, CKMBINDEX, TROPONINI in the last 72 hours. Lipid Profile:   Lab Results   Component Value Date    TRIG 99 2018    HDL 51 2018    LDLCALC 96 2018    CHOL 167 2018        Ct Head Wo Contrast    Result Date: 7/3/2018  Patient MRN:  [id number] : 1950 Age: 70 years Gender: Male Order Date:  7/3/2018 5:15 PM EXAM: CT HEAD WO CONTRAST NUMBER OF IMAGES:  193 INDICATION:  Slurred speech, aphasia  COMPARISON: None TECHNIQUE: CT scan of the head was performed from the vertex through the posterior fossa. No  IV contrast was administered. Coronal and sagittal reconstructions were also reviewed. Low-dose CT  acquisition technique included one of following options; 1 . Automated exposure control, 2. Adjustment of MA and or KV according to patient's size or 3. Use of iterative reconstruction. FINDINGS: There is mild generalized cerebral atrophy with mild to moderate periventricular and deep subcortical white matter low-attenuation indicating chronic microvascular ischemic disease. Small foci of ill-defined low attenuation are seen within the centrum semiovale in the left frontal lobe and in the left frontal subcortical white matter suggesting remote lacunar infarcts. There is mild generalized cerebellar atrophy. No intracerebral hemorrhage or extra-axial fluid collections are seen. There is no hydrocephalus. There is no mass effect, midline shift or sulcal effacement. There is moderate calcification of the carotid siphons. The bony calvarium appears intact. The visualized paranasal sinuses and mastoid air cells cells are clear.  Note is made of a Pascale Miracle cell on the left. CONCLUSION: 1. No acute intracranial abnormality seen. If an acute ischemic infarct is suspected, follow-up MRI with diffusion imaging is recommended for further evaluation. 2. Mild generalized cerebral atrophy with mild to moderate chronic microvascular ischemic changes involving the deep white matter zones and small old appearing lacunar infarcts involving the left frontal lobe white matter zones. 3. Mild generalized cerebellar atrophy. 4. Moderate calcification of the carotid siphons. Xr Chest Portable    Result Date: 7/3/2018  Patient MRN:  [id number] : 1950 Age: 70 years Gender: Male Order Date:  7/3/2018 6:00 PM EXAM: XR CHEST PORTABLE INDICATION:  Possible Stroke Possible Stroke COMPARISON: None NUMBER OF IMAGES:  1 FINDINGS:  AP upright portable view of the chest was obtained. No pulmonary consolidation, pleural effusion or pneumothorax is seen. The heart is mildly enlarged. There are median sternotomy sutures in place. No mediastinal or hilar enlargement is seen. The visible bony structures appear intact. CONCLUSION:  Mild cardiomegaly with evidence of previous median sternotomy. No evidence of an acute cardiopulmonary process. Us Carotid Artery Bilateral    Result Date: 2018  Patient MRN:  [id number] : 1950 Age: 70 years Gender: Male Order Date:  2018 8:00 AM EXAM: US CAROTID ARTERY BILATERAL NUMBER OF IMAGES:  68 INDICATION: Stroke COMPARISON: None FINDINGS: Velocities are elevated on the left ICA\CCA ratios are elevated on the left  The right vertebral artery doppler images demonstrate  antegrade flow. The left vertebral artery doppler images demonstrate  antegrade flow. Grey scale images demonstrate mild plaque identified in the right and left carotid arteries. Atherosclerotic disease. No hemodynamically significant stenosis is identified Estimated stenosis by NASCET criteria in the proximal right carotid artery is between 0% and 49%.  Estimated stenosis by NASCET criteria in the proximal left carotid artery is between 90% and 99%. Discharge Exam:  See progress note from today    Discharge Medication List as of 7/7/2018  1:04 PM      START taking these medications    Details   atorvastatin (LIPITOR) 80 MG tablet Take 1 tablet by mouth nightly, Disp-30 tablet, [id number]      folic acid (FOLVITE) 1 MG tablet Take 1 tablet by mouth daily, Disp-30 tablet, R-3Normal      pantoprazole (PROTONIX) 40 MG tablet Take 1 tablet by mouth every morning (before breakfast), Disp-30 tablet, R-3Normal      Cyanocobalamin 2500 MCG SUBL Place 2,500 mcg under the tongue daily, Disp-100 tablet, R-2Normal         CONTINUE these medications which have CHANGED    Details   amLODIPine (NORVASC) 10 MG tablet Take 1 tablet by mouth daily, Disp-30 tablet, R-3Normal         CONTINUE these medications which have NOT CHANGED    Details   metoprolol succinate (TOPROL XL) 50 MG extended release tablet Take 1 tablet by mouth 2 times dailyHistorical Med      ramipril (ALTACE) 10 MG capsule Take 1 capsule by mouth dailyHistorical Med      aspirin 325 MG tablet Take 325 mg by mouth dailyHistorical Med         STOP taking these medications       pravastatin (PRAVACHOL) 40 MG tablet Comments:   Reason for Stopping:               Time Spent on discharge is more than 30 minutes --      TIME  INCLUDES TIME THAT WAS  SPENT WITH DISCHARGE PAPERS, MEDICATION REVIEW, MEDICATION RECONCILIATION,   PRESCRIPTIONS, CHART REVIEW, PATIENT EXAM , FINAL PROGRESS NOTE, DISCUSSION OF FINDINGS WITH PATIENT AND AVAILABLE FAMILY , AND DICTATION  WHERE NEEDED ; Home Health Care [facility]) FORMS COMPLETED ; N-17  COMPLETION ;  H&P UPDATED ; DURABLE EQUIPMENT FORMS.      Active Hospital Problems    Diagnosis    Aortic stenosis, severe [I35.0]     Priority: High    CAD (coronary artery disease) [I25.10]     Priority: High    Hemispheric carotid artery syndrome [G45.1]    Symptomatic stenosis of left carotid artery

## 2018-07-07 NOTE — PROGRESS NOTES
Vascular Surgery Progress Note    Pt is being seen in f/u today regarding CEA on 7/6. Subjective  Pt s/e. Resting comfortably in bed. Denies any pain, numbness, weakness, paresthesias, or vision changes. Current Medications:      oxyCODONE-acetaminophen **OR** [DISCONTINUED] oxyCODONE-acetaminophen, sodium chloride flush, acetaminophen, ondansetron, nitroGLYCERIN, hydrALAZINE    sodium chloride flush  10 mL Intravenous 2 times per day    cyanocobalamin  1,000 mcg Intramuscular Dinner    folic acid  1 mg Oral Daily    atorvastatin  80 mg Oral Nightly    amLODIPine  10 mg Oral Daily    metoprolol succinate  50 mg Oral BID    ramipril  10 mg Oral Daily    pantoprazole  40 mg Oral QAM AC    aspirin  81 mg Oral Daily        PHYSICAL EXAM:    /68   Pulse 73   Temp 98.1 °F (36.7 °C) (Temporal)   Resp 19   Ht 5' 6\" (1.676 m)   Wt 157 lb (71.2 kg)   SpO2 94%   BMI 25.34 kg/m²     Intake/Output Summary (Last 24 hours) at 07/07/18 0938  Last data filed at 07/07/18 0846   Gross per 24 hour   Intake          3980.93 ml   Output             2975 ml   Net          1005.93 ml          Gen: awake, alert and oriented x3, no apparent distress. Neck:  Incision C/D/I  CVS: RR  Resp: No increased work of breathing  Abd: Soft, non-tender, non-distended  Neuro:  Cranial nerves grossly intact, no facial droop. Eyes PERRL.    Extremities:  Motor and sensation intact B/L UE and LE    LABS:    Lab Results   Component Value Date    WBC 9.6 07/06/2018    HGB 13.3 07/06/2018    HCT 40.1 07/06/2018     (L) 07/06/2018    K 4.5 07/04/2018    BUN 24 (H) 07/04/2018    CREATININE 1.4 (H) 07/04/2018       A/P  70 y.o. male POD1 s/p L CEA     Pain well controlled  Monitor neurological exam  Diet as tolerated  Possible transfer out of [facility] today      [employer]

## 2018-07-07 NOTE — PROGRESS NOTES
Dr. Lindsey Queen answering service called for discharge orders. Also on-call  [facility]. [facility] will contact patient Monday for appointment.   Electronically signed by Lakeisha Barrientos RN on 7/7/2018 at 11:48 AM

## 2018-07-24 ENCOUNTER — OFFICE VISIT (OUTPATIENT)
Dept: VASCULAR SURGERY | Age: 68
End: 2018-07-24

## 2018-07-24 DIAGNOSIS — I65.22 SYMPTOMATIC STENOSIS OF LEFT CAROTID ARTERY WITHOUT INFARCTION: Primary | ICD-10-CM

## 2018-07-24 DIAGNOSIS — Z98.890 S/P CAROTID ENDARTERECTOMY: ICD-10-CM

## 2018-07-24 PROCEDURE — 99024 POSTOP FOLLOW-UP VISIT: CPT | Performed by: NURSE PRACTITIONER

## 2018-07-24 NOTE — PROGRESS NOTES
7/24/2018    Paty Brush  1950    Chief Complaint   Patient presents with    Post-Op Check     s/p (L) CEA       Patient returns for post operative evaluation status post left carotid endarterectomy. The patient denies any unexpected problems since hospital discharge. Procedure Laterality Date    CARDIAC SURGERY      CAROTID ENDARTERECTOMY Left 07/06/2018    Geri Garcia    CORONARY ARTERY BYPASS GRAFT  2008    [facility] REOPER, CAROTID ENDARTEC>1 MON Left 7/6/2018    LEFT CAROTID ENDARTERECTOMY performed by Zach Church MD at [facility]       Physical Exam:  The neck incision is healing without evidence of infection. Heart rhythm is regular. Radial pulse are appreciated bilaterally. Assessment:  Post-operative carotid endarterectomy. Problem List Items Addressed This Visit     Symptomatic stenosis of left carotid artery without infarction - Primary      Other Visit Diagnoses     S/P carotid endarterectomy              I reviewed with the patient that normal activities can be resumed as tolerated. Pt seen and plan reviewed with Dr. Merritt Holland. Derrek Davila CNP     Plan: Return in 6 months for follow-up carotid ultrasound.

## 2019-02-05 ENCOUNTER — HOSPITAL ENCOUNTER (OUTPATIENT)
Dept: CARDIOLOGY | Age: 69
Discharge: HOME | End: 2019-02-05
Payer: MEDICARE

## 2019-02-05 ENCOUNTER — OFFICE VISIT (OUTPATIENT)
Dept: VASCULAR SURGERY | Age: 69
End: 2019-02-05
Payer: MEDICARE

## 2019-02-05 DIAGNOSIS — I65.22 SYMPTOMATIC STENOSIS OF LEFT CAROTID ARTERY WITHOUT INFARCTION: ICD-10-CM

## 2019-02-05 DIAGNOSIS — Z98.890 S/P CAROTID ENDARTERECTOMY: ICD-10-CM

## 2019-02-05 DIAGNOSIS — I65.23 CAROTID ARTERY STENOSIS, ASYMPTOMATIC, BILATERAL: Primary | ICD-10-CM

## 2019-02-05 PROCEDURE — 93880 EXTRACRANIAL BILAT STUDY: CPT

## 2019-02-05 PROCEDURE — 99213 OFFICE O/P EST LOW 20 MIN: CPT | Performed by: NURSE PRACTITIONER

## 2019-06-17 ENCOUNTER — OFFICE VISIT (OUTPATIENT)
Dept: PRIMARY CARE CLINIC | Age: 69
End: 2019-06-17
Payer: MEDICARE

## 2019-06-17 VITALS
TEMPERATURE: 96.5 F | SYSTOLIC BLOOD PRESSURE: 120 MMHG | BODY MASS INDEX: 25.49 KG/M2 | DIASTOLIC BLOOD PRESSURE: 72 MMHG | HEART RATE: 59 BPM | WEIGHT: 158.6 LBS | HEIGHT: 66 IN

## 2019-06-17 DIAGNOSIS — I25.10 CORONARY ARTERY DISEASE INVOLVING NATIVE HEART WITHOUT ANGINA PECTORIS, UNSPECIFIED VESSEL OR LESION TYPE: Primary | ICD-10-CM

## 2019-06-17 DIAGNOSIS — I10 ESSENTIAL HYPERTENSION: ICD-10-CM

## 2019-06-17 PROCEDURE — 99213 OFFICE O/P EST LOW 20 MIN: CPT | Performed by: INTERNAL MEDICINE

## 2019-06-17 RX ORDER — AMLODIPINE BESYLATE 10 MG/1
10 TABLET ORAL DAILY
Qty: 30 TABLET | Refills: 3 | Status: SHIPPED | OUTPATIENT
Start: 2019-06-17 | End: 2019-11-22 | Stop reason: SDUPTHER

## 2019-06-17 RX ORDER — PANTOPRAZOLE SODIUM 40 MG/1
40 TABLET, DELAYED RELEASE ORAL
Qty: 30 TABLET | Refills: 3 | Status: SHIPPED | OUTPATIENT
Start: 2019-06-17 | End: 2019-12-09 | Stop reason: ALTCHOICE

## 2019-06-17 ASSESSMENT — PATIENT HEALTH QUESTIONNAIRE - PHQ9
1. LITTLE INTEREST OR PLEASURE IN DOING THINGS: 0
SUM OF ALL RESPONSES TO PHQ QUESTIONS 1-9: 0
SUM OF ALL RESPONSES TO PHQ9 QUESTIONS 1 & 2: 0
SUM OF ALL RESPONSES TO PHQ QUESTIONS 1-9: 0
2. FEELING DOWN, DEPRESSED OR HOPELESS: 0

## 2019-06-17 ASSESSMENT — ENCOUNTER SYMPTOMS
EYE ITCHING: 0
WHEEZING: 0
FACIAL SWELLING: 0
CHEST TIGHTNESS: 0
SHORTNESS OF BREATH: 0
COUGH: 0
PHOTOPHOBIA: 0
EYE PAIN: 0
GASTROINTESTINAL NEGATIVE: 1
EYE DISCHARGE: 0
SINUS PAIN: 0
VOICE CHANGE: 0
SORE THROAT: 0

## 2019-06-17 NOTE — PROGRESS NOTES
Eleanor Stewart MD at [place] [address]:    Patient has no known allergies. Social History:   Social History     Socioeconomic History    Marital status:      Spouse name: Not on file    Number of children: Not on file    Years of education: Not on file    Highest education level: Not on file   Occupational History    Not on file   Social Needs    Financial resource strain: Not on file    Food insecurity:     Worry: Not on file     Inability: Not on file    Transportation needs:     Medical: Not on file     Non-medical: Not on file   Tobacco Use    Smoking status: Current Every Day Smoker     Packs/day: 1.50     Years: 50.00     Pack years: 75.00     Types: Cigarettes     Start date: 1968    Smokeless tobacco: Never Used    Tobacco comment: H does not want to quit.     Substance and Sexual Activity    Alcohol use: No    Drug use: No    Sexual activity: Not on file   Lifestyle    Physical activity:     Days per week: Not on file     Minutes per session: Not on file    Stress: Not on file   Relationships    Social connections:     Talks on phone: Not on file     Gets together: Not on file     Attends Confucianism service: Not on file     Active member of club or organization: Not on file     Attends meetings of clubs or organizations: Not on file     Relationship status: Not on file    Intimate partner violence:     Fear of current or ex partner: Not on file     Emotionally abused: Not on file     Physically abused: Not on file     Forced sexual activity: Not on file   Other Topics Concern    Not on file   Social History Narrative    Not on file        Family History:       Problem Relation Age of Onset    High Blood Pressure Mother     Stroke Father     High Blood Pressure Father     Breast Cancer Sister     Other Sister        BP Readings from Last 3 Encounters:   06/17/19 120/72   07/07/18 103/80   07/03/18 119/69       Physical Exam:    Vitals: /72 (Site: Right Upper Arm, tablet by mouth every morning (before breakfast) 30 tablet 3    amLODIPine (NORVASC) 10 MG tablet Take 1 tablet by mouth daily 30 tablet 3    atorvastatin (LIPITOR) 80 MG tablet Take 1 tablet by mouth nightly (Patient taking differently: Take 40 mg by mouth nightly ) 30 tablet 3    folic acid (FOLVITE) 1 MG tablet Take 1 tablet by mouth daily 30 tablet 3    Cyanocobalamin 2500 MCG SUBL Place 2,500 mcg under the tongue daily 100 tablet 2    metoprolol succinate (TOPROL XL) 50 MG extended release tablet Take 1 tablet by mouth 2 times daily      ramipril (ALTACE) 10 MG capsule Take 1 capsule by mouth daily      aspirin 325 MG tablet Take 325 mg by mouth daily       No current facility-administered medications for this visit. Brandan Landis MD       This document may have been prepared at least partiallythrough the use of voice recognition software. Although effort is taken to assure the accuracy of this document, it is possible that grammatical, syntax,  or spelling errors may occur.

## 2019-10-01 LAB
ALBUMIN SERPL-MCNC: NORMAL G/DL
ALP BLD-CCNC: NORMAL U/L
ALT SERPL-CCNC: NORMAL U/L
ANION GAP SERPL CALCULATED.3IONS-SCNC: NORMAL MMOL/L
AST SERPL-CCNC: NORMAL U/L
BASOPHILS ABSOLUTE: NORMAL /ΜL
BASOPHILS RELATIVE PERCENT: NORMAL %
BILIRUB SERPL-MCNC: NORMAL MG/DL (ref 0.1–1.4)
BUN BLDV-MCNC: 18 MG/DL
CALCIUM SERPL-MCNC: NORMAL MG/DL
CHLORIDE BLD-SCNC: NORMAL MMOL/L
CHOLESTEROL, TOTAL: 138 MG/DL
CHOLESTEROL/HDL RATIO: 2.6
CO2: NORMAL MMOL/L
CREAT SERPL-MCNC: 1.4 MG/DL
EOSINOPHILS ABSOLUTE: NORMAL /ΜL
EOSINOPHILS RELATIVE PERCENT: NORMAL %
GFR CALCULATED: NORMAL
GLUCOSE BLD-MCNC: 102 MG/DL
HCT VFR BLD CALC: NORMAL % (ref 41–53)
HDLC SERPL-MCNC: 54 MG/DL (ref 35–70)
HEMOGLOBIN: NORMAL G/DL (ref 13.5–17.5)
LDL CHOLESTEROL CALCULATED: 68 MG/DL (ref 0–160)
LYMPHOCYTES ABSOLUTE: NORMAL /ΜL
LYMPHOCYTES RELATIVE PERCENT: NORMAL %
MCH RBC QN AUTO: NORMAL PG
MCHC RBC AUTO-ENTMCNC: NORMAL G/DL
MCV RBC AUTO: NORMAL FL
MONOCYTES ABSOLUTE: NORMAL /ΜL
MONOCYTES RELATIVE PERCENT: NORMAL %
NEUTROPHILS ABSOLUTE: NORMAL /ΜL
NEUTROPHILS RELATIVE PERCENT: NORMAL %
PDW BLD-RTO: NORMAL %
PLATELET # BLD: NORMAL K/ΜL
PMV BLD AUTO: NORMAL FL
POTASSIUM SERPL-SCNC: 4.5 MMOL/L
RBC # BLD: NORMAL 10^6/ΜL
SODIUM BLD-SCNC: NORMAL MMOL/L
TOTAL PROTEIN: NORMAL
TRIGL SERPL-MCNC: 75 MG/DL
VLDLC SERPL CALC-MCNC: NORMAL MG/DL
WBC # BLD: NORMAL 10^3/ML

## 2019-10-03 ENCOUNTER — TELEPHONE (OUTPATIENT)
Dept: PRIMARY CARE CLINIC | Age: 69
End: 2019-10-03

## 2019-10-03 DIAGNOSIS — I10 ESSENTIAL HYPERTENSION: ICD-10-CM

## 2019-10-03 DIAGNOSIS — I25.10 CORONARY ARTERY DISEASE INVOLVING NATIVE HEART WITHOUT ANGINA PECTORIS, UNSPECIFIED VESSEL OR LESION TYPE: ICD-10-CM

## 2019-10-03 DIAGNOSIS — I25.10 CORONARY ARTERY DISEASE INVOLVING NATIVE CORONARY ARTERY OF NATIVE HEART, ANGINA PRESENCE UNSPECIFIED: Primary | ICD-10-CM

## 2019-10-03 RX ORDER — METOPROLOL SUCCINATE 50 MG/1
50 TABLET, EXTENDED RELEASE ORAL 2 TIMES DAILY
Qty: 60 TABLET | Refills: 1 | Status: CANCELLED | OUTPATIENT
Start: 2019-10-03

## 2019-10-03 RX ORDER — METOPROLOL SUCCINATE 50 MG/1
50 TABLET, EXTENDED RELEASE ORAL 2 TIMES DAILY
Qty: 120 TABLET | Refills: 0 | Status: SHIPPED | OUTPATIENT
Start: 2019-10-03 | End: 2019-12-09 | Stop reason: SDUPTHER

## 2019-11-22 RX ORDER — AMLODIPINE BESYLATE 10 MG/1
10 TABLET ORAL DAILY
Qty: 30 TABLET | Refills: 0 | Status: SHIPPED | OUTPATIENT
Start: 2019-11-22 | End: 2019-12-09 | Stop reason: SDUPTHER

## 2019-12-09 ENCOUNTER — OFFICE VISIT (OUTPATIENT)
Dept: PRIMARY CARE CLINIC | Age: 69
End: 2019-12-09
Payer: MEDICARE

## 2019-12-09 VITALS
TEMPERATURE: 98.7 F | HEART RATE: 83 BPM | DIASTOLIC BLOOD PRESSURE: 70 MMHG | OXYGEN SATURATION: 99 % | WEIGHT: 160 LBS | BODY MASS INDEX: 25.71 KG/M2 | HEIGHT: 66 IN | SYSTOLIC BLOOD PRESSURE: 140 MMHG | RESPIRATION RATE: 16 BRPM

## 2019-12-09 DIAGNOSIS — I35.0 AORTIC STENOSIS, SEVERE: Primary | ICD-10-CM

## 2019-12-09 DIAGNOSIS — Z23 NEED FOR PROPHYLACTIC VACCINATION AGAINST STREPTOCOCCUS PNEUMONIAE (PNEUMOCOCCUS): ICD-10-CM

## 2019-12-09 DIAGNOSIS — J43.9 PULMONARY EMPHYSEMA, UNSPECIFIED EMPHYSEMA TYPE (HCC): ICD-10-CM

## 2019-12-09 DIAGNOSIS — E53.8 FOLIC ACID DEFICIENCY: ICD-10-CM

## 2019-12-09 DIAGNOSIS — K21.9 GASTROESOPHAGEAL REFLUX DISEASE, ESOPHAGITIS PRESENCE NOT SPECIFIED: ICD-10-CM

## 2019-12-09 DIAGNOSIS — I25.10 CORONARY ARTERY DISEASE INVOLVING NATIVE CORONARY ARTERY OF NATIVE HEART, ANGINA PRESENCE UNSPECIFIED: ICD-10-CM

## 2019-12-09 DIAGNOSIS — F17.200 TOBACCO DEPENDENCE: ICD-10-CM

## 2019-12-09 DIAGNOSIS — I10 ESSENTIAL HYPERTENSION: ICD-10-CM

## 2019-12-09 DIAGNOSIS — E53.8 VITAMIN B 12 DEFICIENCY: ICD-10-CM

## 2019-12-09 DIAGNOSIS — E78.2 MIXED HYPERLIPIDEMIA: ICD-10-CM

## 2019-12-09 PROCEDURE — 90732 PPSV23 VACC 2 YRS+ SUBQ/IM: CPT | Performed by: FAMILY MEDICINE

## 2019-12-09 PROCEDURE — 99214 OFFICE O/P EST MOD 30 MIN: CPT | Performed by: FAMILY MEDICINE

## 2019-12-09 PROCEDURE — G0009 ADMIN PNEUMOCOCCAL VACCINE: HCPCS | Performed by: FAMILY MEDICINE

## 2019-12-09 PROCEDURE — 93000 ELECTROCARDIOGRAM COMPLETE: CPT | Performed by: FAMILY MEDICINE

## 2019-12-09 RX ORDER — RAMIPRIL 10 MG/1
10 CAPSULE ORAL DAILY
Qty: 90 CAPSULE | Refills: 1 | Status: SHIPPED
Start: 2019-12-09 | End: 2020-03-12 | Stop reason: SDUPTHER

## 2019-12-09 RX ORDER — METOPROLOL SUCCINATE 50 MG/1
50 TABLET, EXTENDED RELEASE ORAL 2 TIMES DAILY
Qty: 180 TABLET | Refills: 1 | Status: SHIPPED
Start: 2019-12-09 | End: 2020-03-12 | Stop reason: SDUPTHER

## 2019-12-09 RX ORDER — PANTOPRAZOLE SODIUM 40 MG/1
40 TABLET, DELAYED RELEASE ORAL
Qty: 90 TABLET | Refills: 0 | Status: CANCELLED | OUTPATIENT
Start: 2019-12-09 | End: 2020-03-08

## 2019-12-09 RX ORDER — AMLODIPINE BESYLATE 10 MG/1
10 TABLET ORAL DAILY
Qty: 90 TABLET | Refills: 1 | Status: SHIPPED
Start: 2019-12-09 | End: 2020-03-12 | Stop reason: SDUPTHER

## 2019-12-09 RX ORDER — ATORVASTATIN CALCIUM 80 MG/1
40 TABLET, FILM COATED ORAL NIGHTLY
Qty: 90 TABLET | Refills: 0 | Status: SHIPPED
Start: 2019-12-09 | End: 2020-03-12 | Stop reason: SDUPTHER

## 2019-12-09 ASSESSMENT — ENCOUNTER SYMPTOMS
ABDOMINAL PAIN: 0
NAUSEA: 0
RHINORRHEA: 1
SORE THROAT: 0
EYE REDNESS: 0
VOMITING: 0
COUGH: 0
BLOOD IN STOOL: 0
DIARRHEA: 0
WHEEZING: 0
CONSTIPATION: 0
SHORTNESS OF BREATH: 1
PHOTOPHOBIA: 0

## 2019-12-13 ENCOUNTER — HOSPITAL ENCOUNTER (OUTPATIENT)
Dept: PULMONOLOGY | Age: 69
Discharge: HOME | End: 2019-12-13
Payer: MEDICARE

## 2019-12-13 DIAGNOSIS — J43.9 PULMONARY EMPHYSEMA, UNSPECIFIED EMPHYSEMA TYPE (HCC): ICD-10-CM

## 2019-12-13 PROCEDURE — 94060 EVALUATION OF WHEEZING: CPT

## 2019-12-13 PROCEDURE — 94010 BREATHING CAPACITY TEST: CPT

## 2020-01-06 ENCOUNTER — HOSPITAL ENCOUNTER (OUTPATIENT)
Dept: NON INVASIVE DIAGNOSTICS | Age: 70
Discharge: HOME | End: 2020-01-06
Payer: MEDICARE

## 2020-01-06 LAB
LV EF: 68 %
LVEF MODALITY: NORMAL

## 2020-01-06 PROCEDURE — 93306 TTE W/DOPPLER COMPLETE: CPT

## 2020-01-09 LAB
BASOPHILS ABSOLUTE: 51 /ΜL
BASOPHILS RELATIVE PERCENT: 0.7 %
BUN BLDV-MCNC: 19 MG/DL
CALCIUM SERPL-MCNC: 8.8 MG/DL
CHLORIDE BLD-SCNC: 106 MMOL/L
CHOLESTEROL, TOTAL: 124 MG/DL
CHOLESTEROL/HDL RATIO: 2.4
CO2: 29 MMOL/L
CREAT SERPL-MCNC: 1.2 MG/DL
CREATININE, URINE: 10
EOSINOPHILS ABSOLUTE: 139 /ΜL
EOSINOPHILS RELATIVE PERCENT: 1.9 %
FOLATE: NORMAL
GFR CALCULATED: NORMAL
GLUCOSE BLD-MCNC: 139 MG/DL
HCT VFR BLD CALC: 41.9 % (ref 41–53)
HDLC SERPL-MCNC: 52 MG/DL (ref 35–70)
HEMOGLOBIN: 14.1 G/DL (ref 13.5–17.5)
LDL CHOLESTEROL CALCULATED: 57 MG/DL (ref 0–160)
LYMPHOCYTES ABSOLUTE: 1482 /ΜL
LYMPHOCYTES RELATIVE PERCENT: 20.3 %
MCH RBC QN AUTO: 29.7 PG
MCHC RBC AUTO-ENTMCNC: 33.7 G/DL
MCV RBC AUTO: 88.4 FL
MICROALBUMIN/CREAT 24H UR: 1.6 MG/G{CREAT}
MICROALBUMIN/CREAT UR-RTO: 163
MONOCYTES ABSOLUTE: 482 /ΜL
MONOCYTES RELATIVE PERCENT: 6.6 %
NEUTROPHILS ABSOLUTE: 5147 /ΜL
NEUTROPHILS RELATIVE PERCENT: 70.5 %
PLATELET # BLD: 140 K/ΜL
PMV BLD AUTO: 11.9 FL
POTASSIUM SERPL-SCNC: 4.5 MMOL/L
RBC # BLD: 4.74 10^6/ΜL
SODIUM BLD-SCNC: 140 MMOL/L
TRIGL SERPL-MCNC: 70 MG/DL
VITAMIN B-12: 6.2
VLDLC SERPL CALC-MCNC: NORMAL MG/DL
WBC # BLD: 7.3 10^3/ML

## 2020-02-11 ENCOUNTER — HOSPITAL ENCOUNTER (OUTPATIENT)
Dept: CARDIOLOGY | Age: 70
Discharge: HOME | End: 2020-02-11
Payer: MEDICARE

## 2020-02-11 ENCOUNTER — OFFICE VISIT (OUTPATIENT)
Dept: VASCULAR SURGERY | Age: 70
End: 2020-02-11
Payer: MEDICARE

## 2020-02-11 PROCEDURE — 99213 OFFICE O/P EST LOW 20 MIN: CPT | Performed by: NURSE PRACTITIONER

## 2020-02-11 PROCEDURE — 93880 EXTRACRANIAL BILAT STUDY: CPT

## 2020-02-11 NOTE — PROGRESS NOTES
Vascular Surgery Outpatient Progress Note      Chief Complaint   Patient presents with    Circulatory Problem     Follow up carotid stenosis       HISTORY OF PRESENT ILLNESS:                The patient is a 78 y.o. male who returns for follow-up evaluation of carotid artery stenosis and L CEA. The patient denies any recent symptoms of stroke, mini stroke, or amaurosis fugax. He denies any recent hospital admission, major illness, or surgery since the last office visit. Past Medical History:        Diagnosis Date    Aortic stenosis, severe 7/6/2018    Aortic valve disorder     Arthritis     CAD (coronary artery disease)     Chronic kidney disease     stage 2    COPD (chronic obstructive pulmonary disease) (HCC)     Folic acid deficiency     GERD (gastroesophageal reflux disease)     Hyperlipidemia     Hypertension     Symptomatic stenosis of left carotid artery without infarction 07/05/2018    Tobacco dependence     Transient cerebral ischemia     Vitamin B 12 deficiency      Past Surgical History:        Procedure Laterality Date    CARDIAC SURGERY      CAROTID ENDARTERECTOMY Left 07/06/2018    Geri Garcia    CORONARY ARTERY BYPASS GRAFT  2008    [place] REOPER, CAROTID ENDARTEC>1 MON Left 7/6/2018    LEFT CAROTID ENDARTERECTOMY performed by Onofre Lobato MD at [facility]     Current Medications:   Prior to Admission medications    Medication Sig Start Date End Date Taking?  Authorizing Provider   metoprolol succinate (TOPROL XL) 50 MG extended release tablet Take 1 tablet by mouth 2 times daily 12/9/19 6/6/20 Yes Nicole Donohue MD   amLODIPine (NORVASC) 10 MG tablet Take 1 tablet by mouth daily 12/9/19 6/6/20 Yes Nicole Donohue MD   atorvastatin (LIPITOR) 80 MG tablet Take 0.5 tablets by mouth nightly 12/9/19 6/6/20 Yes Nicole Donohue MD   ramipril (ALTACE) 10 MG capsule Take 1 capsule by mouth daily 12/9/19 6/6/20 Yes Nicole Donohue MD   Cyanocobalamin 2500 MCG SUBL Place 2,500 mcg under the tongue daily 7/7/18  Yes David Xiao DO   aspirin 325 MG tablet Take 325 mg by mouth daily   Yes Historical Provider, MD   folic acid (FOLVITE) 1 MG tablet Take 1 tablet by mouth daily  Patient not taking: Reported on 2/11/2020 7/8/18   [address],      Allergies:  Patient has no known allergies. Social History     Socioeconomic History    Marital status:      Spouse name: Not on file    Number of children: Not on file    Years of education: Not on file    Highest education level: Not on file   Occupational History    Not on file   Social Needs    Financial resource strain: Not on file    Food insecurity:     Worry: Not on file     Inability: Not on file    Transportation needs:     Medical: Not on file     Non-medical: Not on file   Tobacco Use    Smoking status: Current Every Day Smoker     Packs/day: 1.00     Years: 50.00     Pack years: 50.00     Types: Cigarettes     Start date: 1968    Smokeless tobacco: Never Used    Tobacco comment: Does not want to quit.     Substance and Sexual Activity    Alcohol use: No    Drug use: No    Sexual activity: Not on file   Lifestyle    Physical activity:     Days per week: Not on file     Minutes per session: Not on file    Stress: Not on file   Relationships    Social connections:     Talks on phone: Not on file     Gets together: Not on file     Attends Rastafari service: Not on file     Active member of club or organization: Not on file     Attends meetings of clubs or organizations: Not on file     Relationship status: Not on file    Intimate partner violence:     Fear of current or ex partner: Not on file     Emotionally abused: Not on file     Physically abused: Not on file     Forced sexual activity: Not on file   Other Topics Concern    Not on file   Social History Narrative    Not on file        Family History   Problem Relation Age of Onset    High Blood Pressure Mother     Stroke Father     High Blood Pressure Father     Breast Cancer Sister     Other Sister        REVIEW OF SYSTEMS (New symptoms):    Eyes:      Blurred vision:  No [x]/Yes []               Diplopia:   No [x]/Yes []               Vision loss:       No [x]/Yes []   Ears, nose, throat:             Hearing loss:    No [x]/Yes []      Vertigo:   No [x]/Yes []                       Swallowing problem:  No [x]/Yes []               Nose bleeds:   No [x]/Yes []      Voice hoarseness:  No [x]/Yes []  Respiratory:             Cough:   No [x]/Yes []      Pleuritic chest pain:  No [x]/Yes []                        Dyspnea:   No [x]/Yes []      Wheezing:   No [x]/Yes []  Cardiovascular:             Angina:   No [x]/Yes []      Palpitations:   No [x]/Yes []          Claudication:    No [x]/Yes []      Leg swelling:   No [x]/Yes []  Gastrointestinal:             Nausea or vomiting:  No [x]/Yes []               Abdominal pain:  No [x]/Yes []                     Intestinal bleeding: No [x]/Yes []  Musculoskeletal:             Leg pain:   No [x]/Yes []      Back pain:   No [x]/Yes []                    Weakness:   No [x]/Yes []  Neurologic:             Numbness:   No [x]/Yes []      Paralysis:   No [x]/Yes []                       Headaches:   No [x]/Yes []  Hematologic, lymphatic:   Anemia:   No [x]/Yes []              Bleeding or bruising:  No [x]/Yes []              Fevers or chills: No [x]/Yes []  Endocrine:             Temp intolerance:   No [x]/Yes []                       Polydipsia, polyuria:  No [x]/Yes []  Skin:              Rash:    No [x]/Yes []      Ulcers:   No [x]/Yes []              Abnorm pigment: No [x]/Yes []  :              Frequency/urgency:  No [x]/Yes []      Hematuria:    No [x]/Yes []                      Incontinence:    No [x]/Yes []    PHYSICAL EXAM:  There were no vitals filed for this visit.   General Appearance: alert and oriented to person, place and time, in no acute distress, well developed and well- nourished  Neurologic: no reviewed with Dr. Efrain Michael. Pricilla Bunch, CNP    Return in about 1 year (around 2/11/2021).

## 2020-03-12 ENCOUNTER — OFFICE VISIT (OUTPATIENT)
Dept: PRIMARY CARE CLINIC | Age: 70
End: 2020-03-12
Payer: MEDICARE

## 2020-03-12 VITALS
OXYGEN SATURATION: 99 % | DIASTOLIC BLOOD PRESSURE: 74 MMHG | HEIGHT: 66 IN | RESPIRATION RATE: 18 BRPM | TEMPERATURE: 98.2 F | BODY MASS INDEX: 26.03 KG/M2 | HEART RATE: 70 BPM | WEIGHT: 162 LBS | SYSTOLIC BLOOD PRESSURE: 140 MMHG

## 2020-03-12 PROCEDURE — 99215 OFFICE O/P EST HI 40 MIN: CPT | Performed by: FAMILY MEDICINE

## 2020-03-12 RX ORDER — AMLODIPINE BESYLATE 10 MG/1
10 TABLET ORAL DAILY
Qty: 90 TABLET | Refills: 1 | Status: SHIPPED
Start: 2020-03-12 | End: 2020-06-02 | Stop reason: SDUPTHER

## 2020-03-12 RX ORDER — METOPROLOL SUCCINATE 50 MG/1
50 TABLET, EXTENDED RELEASE ORAL 2 TIMES DAILY
Qty: 180 TABLET | Refills: 1 | Status: SHIPPED
Start: 2020-03-12 | End: 2020-06-02 | Stop reason: SDUPTHER

## 2020-03-12 RX ORDER — ATORVASTATIN CALCIUM 80 MG/1
40 TABLET, FILM COATED ORAL NIGHTLY
Qty: 90 TABLET | Refills: 0 | Status: SHIPPED
Start: 2020-03-12 | End: 2020-11-23 | Stop reason: SDUPTHER

## 2020-03-12 RX ORDER — RAMIPRIL 10 MG/1
10 CAPSULE ORAL DAILY
Qty: 90 CAPSULE | Refills: 1 | Status: SHIPPED
Start: 2020-03-12 | End: 2020-07-22 | Stop reason: SDUPTHER

## 2020-03-12 ASSESSMENT — ENCOUNTER SYMPTOMS
VOMITING: 0
SHORTNESS OF BREATH: 0
ABDOMINAL PAIN: 0
CONSTIPATION: 0
RHINORRHEA: 0
DIARRHEA: 0
WHEEZING: 0
NAUSEA: 0
SORE THROAT: 0

## 2020-03-12 NOTE — PROGRESS NOTES
discuss quitting.     CKD Stage II: Baseline creatinine of 1.2-1.4. Stable.     Nonrheumatic Severe Aortic Valve Stenosis:  Patient recently had a echo performed. Showed mild left ventricular hypertrophy. Did not meet criteria for diastolic dysfunction. EF was approximately 65%. Patient does have aortic valve moderately sclerotic. Moderate aortic stenosis present.     GERD: Otc antacids. Reports no sig. Symptoms off of such.      HM: Due for multiple. Labs: Total cholesterol 124, LDL 57, within normal limits BMP except elevated creatinine at 1.2, urine microalbumin 1.6, B12 and folate normal, normal CBC. Review of Systems   Constitutional: Negative for chills and fever. HENT: Negative for congestion, rhinorrhea and sore throat. Respiratory: Negative for shortness of breath and wheezing. Cardiovascular: Negative for chest pain and leg swelling. Gastrointestinal: Negative for abdominal pain, constipation, diarrhea, nausea and vomiting. Skin: Negative for rash. Neurological: Negative for light-headedness and headaches. Past Medical History:   Diagnosis Date    Aortic stenosis, severe 7/6/2018    Aortic valve disorder     Arthritis     CAD (coronary artery disease)     Chronic kidney disease     stage 2    COPD (chronic obstructive pulmonary disease) (HCC)     Folic acid deficiency     GERD (gastroesophageal reflux disease)     Hyperlipidemia     Hypertension     Symptomatic stenosis of left carotid artery without infarction 07/05/2018    Tobacco dependence     Transient cerebral ischemia     Vitamin B 12 deficiency        Prior to Visit Medications    Medication Sig Taking?  Authorizing Provider   metoprolol succinate (TOPROL XL) 50 MG extended release tablet Take 1 tablet by mouth 2 times daily Yes Que Huang MD   amLODIPine (NORVASC) 10 MG tablet Take 1 tablet by mouth daily Yes Que Huang MD   atorvastatin (LIPITOR) 80 MG tablet Take 0.5 tablets by mouth nightly Yes Zeferino Sykes MD   ramipril (ALTACE) 10 MG capsule Take 1 capsule by mouth daily Yes Zeferino Sykes MD   folic acid (FOLVITE) 1 MG tablet Take 1 tablet by mouth daily Yes Mary Xiao DO   Cyanocobalamin 2500 MCG SUBL Place 2,500 mcg under the tongue daily Yes Mary Xiao DO   aspirin 325 MG tablet Take 325 mg by mouth daily Yes Historical Provider, MD        No Known Allergies    Social History     Tobacco Use    Smoking status: Current Every Day Smoker     Packs/day: 1.00     Years: 50.00     Pack years: 50.00     Types: Cigarettes     Start date: 1968    Smokeless tobacco: Never Used    Tobacco comment: Does not want to quit. Substance Use Topics    Alcohol use: No           Vitals:    03/12/20 1442 03/12/20 1446   BP: (!) 140/74 (!) 140/74   Pulse: 70    Resp: 18    Temp: 98.2 °F (36.8 °C)    SpO2: 99%    Weight: 162 lb (73.5 kg)    Height: 5' 6\" (1.676 m)      Estimated body mass index is 26.15 kg/m² as calculated from the following:    Height as of this encounter: 5' 6\" (1.676 m). Weight as of this encounter: 162 lb (73.5 kg). Physical Exam  Constitutional:       Appearance: He is well-developed. HENT:      Head: Normocephalic. Eyes:      Conjunctiva/sclera: Conjunctivae normal.      Pupils: Pupils are equal, round, and reactive to light. Cardiovascular:      Rate and Rhythm: Normal rate and regular rhythm. Heart sounds: Normal heart sounds. No murmur. Pulmonary:      Effort: Pulmonary effort is normal.      Breath sounds: Normal breath sounds. No wheezing or rales. Abdominal:      General: Bowel sounds are normal.      Palpations: Abdomen is soft. Tenderness: There is no abdominal tenderness. Neurological:      Mental Status: He is alert. Comments: Cranial nerves grossly intact         ASSESSMENT/PLAN:  Rodolfo Ba was seen today for 3 month follow-up.     Diagnoses and all orders for this visit:    Nonrheumatic aortic valve stenosis  Recent echo shows

## 2020-06-02 RX ORDER — METOPROLOL SUCCINATE 50 MG/1
50 TABLET, EXTENDED RELEASE ORAL 2 TIMES DAILY
Qty: 180 TABLET | Refills: 1 | Status: SHIPPED
Start: 2020-06-02 | End: 2020-11-23 | Stop reason: SDUPTHER

## 2020-06-02 RX ORDER — AMLODIPINE BESYLATE 10 MG/1
10 TABLET ORAL DAILY
Qty: 90 TABLET | Refills: 1 | Status: SHIPPED
Start: 2020-06-02 | End: 2020-11-23 | Stop reason: SDUPTHER

## 2020-07-06 ENCOUNTER — TELEPHONE (OUTPATIENT)
Dept: PRIMARY CARE CLINIC | Age: 70
End: 2020-07-06

## 2020-07-22 ENCOUNTER — TELEPHONE (OUTPATIENT)
Dept: ADMINISTRATIVE | Age: 70
End: 2020-07-22

## 2020-07-22 ENCOUNTER — OFFICE VISIT (OUTPATIENT)
Dept: PRIMARY CARE CLINIC | Age: 70
End: 2020-07-22
Payer: MEDICARE

## 2020-07-22 VITALS
DIASTOLIC BLOOD PRESSURE: 74 MMHG | WEIGHT: 159.8 LBS | BODY MASS INDEX: 25.68 KG/M2 | SYSTOLIC BLOOD PRESSURE: 130 MMHG | TEMPERATURE: 96.9 F | HEART RATE: 60 BPM | HEIGHT: 66 IN | OXYGEN SATURATION: 96 % | RESPIRATION RATE: 16 BRPM

## 2020-07-22 PROCEDURE — 90471 IMMUNIZATION ADMIN: CPT | Performed by: FAMILY MEDICINE

## 2020-07-22 PROCEDURE — 90715 TDAP VACCINE 7 YRS/> IM: CPT | Performed by: FAMILY MEDICINE

## 2020-07-22 PROCEDURE — 99214 OFFICE O/P EST MOD 30 MIN: CPT | Performed by: FAMILY MEDICINE

## 2020-07-22 RX ORDER — RAMIPRIL 10 MG/1
10 CAPSULE ORAL DAILY
Qty: 90 CAPSULE | Refills: 1 | Status: SHIPPED
Start: 2020-07-22 | End: 2020-09-30 | Stop reason: SDUPTHER

## 2020-07-22 RX ORDER — ASPIRIN 325 MG
325 TABLET ORAL DAILY
Qty: 90 TABLET | Refills: 2
Start: 2020-07-22

## 2020-07-22 RX ORDER — FOLIC ACID 1 MG/1
1 TABLET ORAL DAILY
Qty: 30 TABLET | Refills: 3
Start: 2020-07-22 | End: 2020-11-23 | Stop reason: SDDI

## 2020-07-22 RX ORDER — VITAMIN B COMPLEX
2500 TABLET ORAL DAILY
Qty: 100 TABLET | Refills: 2
Start: 2020-07-22 | End: 2021-04-29

## 2020-07-22 ASSESSMENT — ENCOUNTER SYMPTOMS
SHORTNESS OF BREATH: 0
SORE THROAT: 0
WHEEZING: 0
CONSTIPATION: 0
COUGH: 0
NAUSEA: 0
DIARRHEA: 0
ABDOMINAL PAIN: 0
RHINORRHEA: 0
VOMITING: 0

## 2020-07-22 ASSESSMENT — PATIENT HEALTH QUESTIONNAIRE - PHQ9
SUM OF ALL RESPONSES TO PHQ QUESTIONS 1-9: 0
2. FEELING DOWN, DEPRESSED OR HOPELESS: 0
1. LITTLE INTEREST OR PLEASURE IN DOING THINGS: 0
SUM OF ALL RESPONSES TO PHQ QUESTIONS 1-9: 0
SUM OF ALL RESPONSES TO PHQ9 QUESTIONS 1 & 2: 0

## 2020-07-22 NOTE — PROGRESS NOTES
2020     Chief Complaint   Patient presents with    Coronary Artery Disease     nonrhuematic aortic valve stenosis        HPI  Brit Sebastian (:  1950) is a 69 y.o. male, here for evaluation of the following medical concerns:    Tk Maldonado is a 34-year-old male with a past medical history of hypertension, hyperlipidemia, history of coronary artery disease, carotid artery disease and left sided CEA, COPD, current smoker, CKD stage II, nonrheumatic aortic valve stenosis     HTN: Patient blood pressure today is below 150/90 which is his goal. Patient is currently on amlodipine 10 mg daily, ramipril 10 mg daily, Metoprolol ER 50 mg twice daily. Does not check his BP at home.     HLD: Atorvastatin 40mg daily due to the SE of fatigue on higher dose.     CAD: No recent acute exacerbations. Patient is currently on blood pressure control with amlodipine 10 mg daily. ACE inhibitor: Ramipril 10 mg daily, beta blocker: metoprolol extended release 50 mg twice daily. Patient is also on aspirin 325 mg daily. Statin, atorvastatin 40 mg daily. Patient has not seen cardiology for 10+ years and reports he was told he did not need to continue to see cardiology.      Carotid Artery Disease w/ TIA:  Recent US showed stability of his carotid artery stenosis and left CEA. Patient is to follow-up in [place] in in the year .     Left sided CEA: Following w/ vascular surgery as noted above.     COPD:  Positive PFT showing moderate airflow obstruction and COPD. Patient reports that he is largely asymptomatic for times when the patient reports significant exertion and feeling out of breath after such. Patient reports that with rest he is able to return to normal. Patient denies any chest pain, palpitations, significant shift lightheadedness or dizziness.     Current Smoker: Patient is reluctant to discuss quitting.     CKD Stage II: Baseline creatinine of 1.2-1.4.  Stable.     Nonrheumatic Severe Aortic Valve Stenosis:  Patient recently had a echo performed. Showed mild left ventricular hypertrophy. Did not meet criteria for diastolic dysfunction. EF was approximately 65%. Patient does have aortic valve moderately sclerotic. Moderate aortic stenosis present. Repeat Echo due in March 2021.     GERD: Otc antacids. Reports no sig. Symptoms off of such.      HM: Due for multiple.     Needs repeat labs. Review of Systems   Constitutional: Negative for chills and fever. HENT: Negative for congestion, rhinorrhea and sore throat. Eyes: Negative for visual disturbance. Respiratory: Negative for cough, shortness of breath and wheezing. Cardiovascular: Negative for chest pain and leg swelling. Gastrointestinal: Negative for abdominal pain, constipation, diarrhea, nausea and vomiting. Genitourinary: Negative for dysuria. Musculoskeletal: Positive for arthralgias. Skin: Negative for rash. Neurological: Negative for dizziness, weakness, light-headedness, numbness and headaches. Past Medical History:   Diagnosis Date    Aortic stenosis, severe 7/6/2018    Aortic valve disorder     Arthritis     CAD (coronary artery disease)     Chronic kidney disease     stage 2    COPD (chronic obstructive pulmonary disease) (HCC)     Folic acid deficiency     GERD (gastroesophageal reflux disease)     Hyperlipidemia     Hypertension     Symptomatic stenosis of left carotid artery without infarction 07/05/2018    Tobacco dependence     Transient cerebral ischemia     Vitamin B 12 deficiency        Prior to Visit Medications    Medication Sig Taking?  Authorizing Provider   ramipril (ALTACE) 10 MG capsule Take 1 capsule by mouth daily Yes Linus Bailey MD   aspirin 325 MG tablet Take 1 tablet by mouth daily Yes Linus Bailey MD   Cyanocobalamin 2500 MCG SUBL Place 2,500 mcg under the tongue daily Yes Linus Bailey MD   folic acid (FOLVITE) 1 MG tablet Take 1 tablet by mouth daily Yes Linus Bailey MD amLODIPine (NORVASC) 10 MG tablet Take 1 tablet by mouth daily Yes Osmar Lambert MD   metoprolol succinate (TOPROL XL) 50 MG extended release tablet Take 1 tablet by mouth 2 times daily Yes Osmar Lambert MD   atorvastatin (LIPITOR) 80 MG tablet Take 0.5 tablets by mouth nightly Yes Osmar Lambert MD        No Known Allergies    Social History     Tobacco Use    Smoking status: Current Every Day Smoker     Packs/day: 1.00     Years: 50.00     Pack years: 50.00     Types: Cigarettes     Start date: 1968    Smokeless tobacco: Never Used    Tobacco comment: Does not want to quit. Substance Use Topics    Alcohol use: No           Vitals:    07/22/20 0900   BP: 130/74   Pulse: 60   Resp: 16   Temp: 96.9 °F (36.1 °C)   SpO2: 96%   Weight: 159 lb 12.8 oz (72.5 kg)   Height: 5' 6\" (1.676 m)     Estimated body mass index is 25.79 kg/m² as calculated from the following:    Height as of this encounter: 5' 6\" (1.676 m). Weight as of this encounter: 159 lb 12.8 oz (72.5 kg). Physical Exam  Constitutional:       General: He is not in acute distress. Appearance: He is well-developed and normal weight. HENT:      Head: Normocephalic. Eyes:      Extraocular Movements: Extraocular movements intact. Conjunctiva/sclera: Conjunctivae normal.   Cardiovascular:      Rate and Rhythm: Normal rate and regular rhythm. Heart sounds: Murmur present. Pulmonary:      Effort: Pulmonary effort is normal.      Breath sounds: Normal breath sounds. No wheezing or rales. Abdominal:      General: Bowel sounds are normal.      Palpations: Abdomen is soft. Tenderness: There is no abdominal tenderness. Musculoskeletal:      Right lower leg: No edema. Left lower leg: No edema. Skin:     Findings: No rash. Neurological:      General: No focal deficit present. Mental Status: He is alert.       Comments: Cranial nerves grossly intact   Psychiatric:         Mood and Affect: Mood normal. dependence  Patient continues to smoke 1 pack/day. Pre-contemplative. Hemispheric carotid artery syndrome/Symptomatic stenosis of left carotid artery without infarction  Stable. Asymptomatic. Patient is following with vascular surgery regards this issue. Patient had stability on most recent carotid ultrasound    Stage 2 chronic kidney disease  Repeat labs. Patient to avoid nephrotoxic drugs. Other orders  -     aspirin 325 MG tablet; Take 1 tablet by mouth daily  -     Cyanocobalamin 2500 MCG SUBL; Place 2,500 mcg under the tongue daily  -     folic acid (FOLVITE) 1 MG tablet; Take 1 tablet by mouth daily    Order Cologuard at next apt. Return in about 4 months (around 11/22/2020) for Follow-up Appointment From Today's Visit, To Discuss Labs Results. An [employer] was used to authenticate this note.     --India Stratton MD on 7/22/20 at 9:39 AM EDT

## 2020-07-22 NOTE — TELEPHONE ENCOUNTER
Pt's wife, Emanuel Wang, has some questions regarding pt's echo and carotid test being done the same day? Please contact her.

## 2020-09-09 ENCOUNTER — OFFICE VISIT (OUTPATIENT)
Dept: PRIMARY CARE CLINIC | Age: 70
End: 2020-09-09
Payer: MEDICARE

## 2020-09-09 VITALS
SYSTOLIC BLOOD PRESSURE: 108 MMHG | TEMPERATURE: 97.9 F | DIASTOLIC BLOOD PRESSURE: 78 MMHG | WEIGHT: 156 LBS | HEART RATE: 60 BPM | BODY MASS INDEX: 25.07 KG/M2 | HEIGHT: 66 IN | RESPIRATION RATE: 20 BRPM | OXYGEN SATURATION: 98 %

## 2020-09-09 PROCEDURE — 99497 ADVNCD CARE PLAN 30 MIN: CPT | Performed by: FAMILY MEDICINE

## 2020-09-09 PROCEDURE — 99406 BEHAV CHNG SMOKING 3-10 MIN: CPT | Performed by: FAMILY MEDICINE

## 2020-09-09 PROCEDURE — G0439 PPPS, SUBSEQ VISIT: HCPCS | Performed by: FAMILY MEDICINE

## 2020-09-09 ASSESSMENT — PATIENT HEALTH QUESTIONNAIRE - PHQ9
SUM OF ALL RESPONSES TO PHQ QUESTIONS 1-9: 0
SUM OF ALL RESPONSES TO PHQ9 QUESTIONS 1 & 2: 0
SUM OF ALL RESPONSES TO PHQ QUESTIONS 1-9: 0
1. LITTLE INTEREST OR PLEASURE IN DOING THINGS: 0
2. FEELING DOWN, DEPRESSED OR HOPELESS: 0

## 2020-09-09 ASSESSMENT — LIFESTYLE VARIABLES: HOW OFTEN DO YOU HAVE A DRINK CONTAINING ALCOHOL: 0

## 2020-09-09 NOTE — PATIENT INSTRUCTIONS
Advance Directives: Care Instructions  Overview  An advance directive is a legal way to state your wishes at the end of your life. It tells your family and your doctor what to do if you can't say what you want. There are two main types of advance directives. You can change them any time your wishes change. Living will. This form tells your family and your doctor your wishes about life support and other treatment. The form is also called a declaration. Medical power of . This form lets you name a person to make treatment decisions for you when you can't speak for yourself. This person is called a health care agent (health care proxy, health care surrogate). The form is also called a durable power of  for health care. If you do not have an advance directive, decisions about your medical care may be made by a family member, or by a doctor or a  who doesn't know you. It may help to think of an advance directive as a gift to the people who care for you. If you have one, they won't have to make tough decisions by themselves. Follow-up care is a key part of your treatment and safety. Be sure to make and go to all appointments, and call your doctor if you are having problems. It's also a good idea to know your test results and keep a list of the medicines you take. What should you include in an advance directive? Many states have a unique advance directive form. (It may ask you to address specific issues.) Or you might use a universal form that's approved by many states. If your form doesn't tell you what to address, it may be hard to know what to include in your advance directive. Use the questions below to help you get started. · Who do you want to make decisions about your medical care if you are not able to? · What life-support measures do you want if you have a serious illness that gets worse over time or can't be cured? · What are you most afraid of that might happen? (Maybe you're afraid of having pain, losing your independence, or being kept alive by machines.)  · Where would you prefer to die? (Your home? A hospital? A nursing home?)  · Do you want to donate your organs when you die? · Do you want certain Buddhist practices performed before you die? When should you call for help? Be sure to contact your doctor if you have any questions. Where can you learn more? Go to https://[email or web address] and sign in to your Gold Lasso account. Enter R264 in the kissnofrog box to learn more about \"Advance Directives: Care Instructions. \"     If you do not have an account, please click on the \"Sign Up Now\" link. Current as of: December 9, 2019               Content Version: 12.5  © 9821-9587 Healthwise, Incorporated. Care instructions adapted under license by [facility] ([facility]). If you have questions about a medical condition or this instruction, always ask your healthcare professional. [place] [address] any warranty or liability for your use of this information. Learning About Medical Power of   What is a medical power of ? A medical power of , also called a durable power of  for health care, is one type of the legal forms called advance directives. It lets you name the person you want to make treatment decisions for you if you can't speak or decide for yourself. The person you choose is called your health care agent. This person is also called a health care proxy or health care surrogate. A medical power of  may be called something else in your state. How do you choose a health care agent? Choose your health care agent carefully. This person may or may not be a family member. Talk to the person before you make your final decision. Make sure he or she is comfortable with this responsibility. It's a good idea to choose someone who:  · Is at least 25years old.   · Knows you well and understands what makes life meaningful for you. · Understands your Spiritism and moral values. · Will do what you want, not what he or she wants. · Will be able to make difficult choices at a stressful time. · Will be able to refuse or stop treatment, if that is what you would want, even if you could die. · Will be firm and confident with health professionals if needed. · Will ask questions to get needed information. · Lives near you or agrees to travel to you if needed. Your family may help you make medical decisions while you can still be part of that process. But it's important to choose one person to be your health care agent in case you aren't able to make decisions for yourself. If you don't fill out the legal form and name a health care agent, the decisions your family can make may be limited. A health care agent may be called something else in your state. Who will make decisions for you if you don't have a health care agent? If you don't have a health care agent or a living will, you may not get the care you want. Decisions may be made by family members who disagree about your medical care. Or decisions may be made by a medical professional who doesn't know you well. In some cases, a  makes the decisions. When you name a health care agent, it is very clear who has the power to make health decisions for you. How do you name a health care agent? You name your health care agent on a legal form. This form is usually called a medical power of . Ask your hospital, state bar association, or office on aging where to find these forms. You must sign the form to make it legal. Some states require you to get the form notarized. This means that a person called a  watches you sign the form and then he or she signs the form. Some states also require that two or more witnesses sign the form. Be sure to tell your family members and doctors who your health care agent is.   Where can you learn more? Go to https://chpepiceweb.Humedics. org and sign in to your [employer] account. Enter [id number] in the [facility] box to learn more about \"Learning About [address]. \"     If you do not have an account, please click on the \"Sign Up Now\" link. Current as of: December 9, 2019               Content Version: 12.5  © 2506-8028 [employer]. Care instructions adapted under license by [facility] ([facility]). If you have questions about a medical condition or this instruction, always ask your healthcare professional. [address] any warranty or liability for your use of this information. Learning About Living Rossi Medina  What is a living will? A living will, also called a declaration, is a legal form. It tells your family and your doctor your wishes when you can't speak for yourself. It's used by the health professionals who will treat you as you near the end of your life or if you get seriously hurt or ill. If you put your wishes in writing, your loved ones and others will know what kind of care you want. They won't need to guess. This can ease your mind and be helpful to others. And you can change or cancel your living will at any time. A living will is not the same as an estate or property will. An estate will explains what you want to happen with your money and property after you die. How do you use it? A living will is used to describe the kinds of treatment or life support you want as you near the end of your life or if you get seriously hurt or ill. Keep these facts in mind about living valencia. · Your living will is used only if you can't speak or make decisions for yourself. Most often, one or more doctors must certify that you can't speak or decide for yourself before your living will takes effect. · If you get better and can speak for yourself again, you can accept or refuse any treatment.  It doesn't matter what you said in your can't breathe on your own, your heart stops, or you can't swallow. · What things would you still want to be able to do after you receive life-support methods? Would you want to be able to walk? To speak? To eat on your own? To live without the help of machines? · Do you want certain Yazidism practices performed if you become very ill? · If you have a choice, where do you want to be cared for? In your home? At a hospital or nursing home? · If you have a choice at the end of your life, where would you prefer to die? At home? In a hospital or nursing home? Somewhere else? · Would you prefer to be buried or cremated? · Do you want your organs to be donated after you die? What should you do with your living will? · Make sure that your family members and your health care agent have copies of your living will (also called a declaration). · Give your doctor a copy of your living will. Ask him or her to keep it as part of your medical record. If you have more than one doctor, make sure that each one has a copy. · Put a copy of your living will where it can be easily found. For example, some people may put a copy on their refrigerator door. If you are using a digital copy, be sure your doctor, family members, and health care agent know how to find and access it. Where can you learn more? Go to https://CoinEx.pwpepiceweb.[employer]. org and sign in to your Spotster account. Enter H329 in the [facility] box to learn more about \"Learning About Living Carlos Amaya. \"     If you do not have an account, please click on the \"Sign Up Now\" link. Current as of: December 9, 2019               Content Version: 12.5  © 9712-5873 Healthwise, Incorporated. Care instructions adapted under license by [facility] ([facility]). If you have questions about a medical condition or this instruction, always ask your healthcare professional. [address] any warranty or liability for your use of this information. Stopping Smoking: Care Instructions  Your Care Instructions     Cigarette smokers crave the nicotine in cigarettes. Giving it up is much harder than simply changing a habit. Your body has to stop craving the nicotine. It is hard to quit, but you can do it. There are many tools that people use to quit smoking. You may find that combining tools works best for you. There are several steps to quitting. First you get ready to quit. Then you get support to help you. After that, you learn new skills and behaviors to become a nonsmoker. For many people, a necessary step is getting and using medicine. Your doctor will help you set up the plan that best meets your needs. You may want to attend a smoking cessation program to help you quit smoking. When you choose a program, look for one that has proven success. Ask your doctor for ideas. You will greatly increase your chances of success if you take medicine as well as get counseling or join a cessation program.  Some of the changes you feel when you first quit tobacco are uncomfortable. Your body will miss the nicotine at first, and you may feel short-tempered and grumpy. You may have trouble sleeping or concentrating. Medicine can help you deal with these symptoms. You may struggle with changing your smoking habits and rituals. The last step is the tricky one: Be prepared for the smoking urge to continue for a time. This is a lot to deal with, but keep at it. You will feel better. Follow-up care is a key part of your treatment and safety. Be sure to make and go to all appointments, and call your doctor if you are having problems. It's also a good idea to know your test results and keep a list of the medicines you take. How can you care for yourself at home? · Ask your family, friends, and coworkers for support. You have a better chance of quitting if you have help and support.   · Join a support group, such as Nicotine Anonymous, for people who are trying to quit smoking. · Consider signing up for a smoking cessation program, such as the American Lung Association's Freedom from Smoking program.  · Get text messaging support. Go to the website at www.smokefree. gov to sign up for the [facility] program.  · Set a quit date. Pick your date carefully so that it is not right in the middle of a big deadline or stressful time. Once you quit, do not even take a puff. Get rid of all ashtrays and lighters after your last cigarette. Clean your house and your clothes so that they do not smell of smoke. · Learn how to be a nonsmoker. Think about ways you can avoid those things that make you reach for a cigarette. ? Avoid situations that put you at greatest risk for smoking. For some people, it is hard to have a drink with friends without smoking. For others, they might skip a coffee break with coworkers who smoke. ? Change your daily routine. Take a different route to work or eat a meal in a different place. · Cut down on stress. Calm yourself or release tension by doing an activity you enjoy, such as reading a book, taking a hot bath, or gardening. · Talk to your doctor or pharmacist about nicotine replacement therapy, which replaces the nicotine in your body. You still get nicotine but you do not use tobacco. Nicotine replacement products help you slowly reduce the amount of nicotine you need. These products come in several forms, many of them available over-the-counter:  ? Nicotine patches  ? Nicotine gum and lozenges  ? Nicotine inhaler  · Ask your doctor about bupropion (Wellbutrin) or varenicline (Chantix), which are prescription medicines. They do not contain nicotine. They help you by reducing withdrawal symptoms, such as stress and anxiety. · Some people find hypnosis, acupuncture, and massage helpful for ending the smoking habit. · Eat a healthy diet and get regular exercise. Having healthy habits will help your body move past its craving for nicotine.   · Be prepared to keep trying. Most people are not successful the first few times they try to quit. Do not get mad at yourself if you smoke again. Make a list of things you learned and think about when you want to try again, such as next week, next month, or next year. Where can you learn more? Go to https://chpepuraewyoel.healthInzen Studio. org and sign in to your WooMe account. Enter Z514 in the Jebbit box to learn more about \"Stopping Smoking: Care Instructions. \"     If you do not have an account, please click on the \"Sign Up Now\" link. Current as of: March 12, 2020               Content Version: 12.5  © 2006-2020 Healthwise, [employer]ingo. Care instructions adapted under license by [facility] ([facility]). If you have questions about a medical condition or this instruction, always ask your healthcare professional. [place] [address] any warranty or liability for your use of this information. Learning About Benefits From Quitting Smoking  How does quitting smoking make you healthier? If you're thinking about quitting smoking, you may have a few reasons to be smoke-free. Your health may be one of them. · When you quit smoking, you lower your risks for cancer, lung disease, heart attack, stroke, blood vessel disease, and blindness from macular degeneration. · When you're smoke-free, you get sick less often, and you heal faster. You are less likely to get colds, flu, bronchitis, and pneumonia. · As a nonsmoker, you may find that your mood is better and you are less stressed. When and how will you feel healthier? Quitting has real health benefits that start from day 1 of being smoke-free. And the longer you stay smoke-free, the healthier you get and the better you feel. The first hours  · After just 20 minutes, your blood pressure and heart rate go down. That means there's less stress on your heart and blood vessels.   · Within 12 hours, the level of carbon monoxide in your blood drops back to normal. That makes room for more oxygen. With more oxygen in your body, you may notice that you have more energy than when you smoked. After 2 weeks  · Your lungs start to work better. · Your risk of heart attack starts to drop. After 1 month  · When your lungs are clear, you cough less and breathe deeper, so it's easier to be active. · Your sense of taste and smell return. That means you can enjoy food more than you have since you started smoking. Over the years  · Over the years, your risks of heart disease, heart attack, and stroke are lower. · After 10 years, your risk of dying from lung cancer is cut by about half. And your risk for many other types of cancer is lower too. How would quitting help others in your life? When you quit smoking, you improve the health of everyone who now breathes in your smoke. · Their heart, lung, and cancer risks drop, much like yours. · They are sick less. For babies and small children, living smoke-free means they're less likely to have ear infections, pneumonia, and bronchitis. · If you're a woman who is or will be pregnant someday, quitting smoking means a healthier . · Children who are close to you are less likely to become adult smokers. Where can you learn more? Go to https://MyCabbage.YoPro Global. org and sign in to your Stockdrift account. Enter [phone] [id number] [phone] in the [facility] box to learn more about \"Learning About Benefits From Quitting Smoking. \"     If you do not have an account, please click on the \"Sign Up Now\" link. Current as of: 2020               Content Version: 12.5  © 4504-3658 Healthwise, Incorporated. Care instructions adapted under license by [facility] ([facility]). If you have questions about a medical condition or this instruction, always ask your healthcare professional. [place] [address] any warranty or liability for your use of this information.       Personalized Preventive Plan for Albert Lea Smoker Desi Compton - 9/9/2020  Medicare offers a range of preventive health benefits. Some of the tests and screenings are paid in full while other may be subject to a deductible, co-insurance, and/or copay. Some of these benefits include a comprehensive review of your medical history including lifestyle, illnesses that may run in your family, and various assessments and screenings as appropriate. After reviewing your medical record and screening and assessments performed today your provider may have ordered immunizations, labs, imaging, and/or referrals for you. A list of these orders (if applicable) as well as your Preventive Care list are included within your After Visit Summary for your review. Other Preventive Recommendations:    · A preventive eye exam performed by an eye specialist is recommended every 1-2 years to screen for glaucoma; cataracts, macular degeneration, and other eye disorders. · A preventive dental visit is recommended every 6 months. · Try to get at least 150 minutes of exercise per week or 10,000 steps per day on a pedometer . · Order or download the FREE \"Exercise & Physical Activity: Your Everyday Guide\" from [employer]. Call 8-609.184.2785 or search [employer] online. · You need 1554-8460 mg of calcium and 8072-9861 IU of vitamin D per day. It is possible to meet your calcium requirement with diet alone, but a vitamin D supplement is usually necessary to meet this goal.  · When exposed to the sun, use a sunscreen that protects against both UVA and UVB radiation with an SPF of 30 or greater. Reapply every 2 to 3 hours or after sweating, drying off with a towel, or swimming. · Always wear a seat belt when traveling in a car. Always wear a helmet when riding a bicycle or motorcycle.

## 2020-09-09 NOTE — PROGRESS NOTES
Left 07/06/2018    Geri Garcia    CORONARY ARTERY BYPASS GRAFT  2008    ND REOPER, CAROTID ENDARTEC>1 MON Left 7/6/2018    LEFT CAROTID ENDARTERECTOMY performed by Sandra Vega MD at [facility] OR         Family History   Problem Relation Age of Onset    High Blood Pressure Mother     Stroke Father     High Blood Pressure Father     Breast Cancer Sister     Other Sister        CareTeam (Including outside providers/suppliers regularly involved in providing care):   Patient Care Team:  Refugio Narayan MD as PCP - General (Family Medicine)  Refugio Narayan MD as PCP - [facility] Provider    Wt Readings from Last 3 Encounters:   09/09/20 156 lb (70.8 kg)   07/22/20 159 lb 12.8 oz (72.5 kg)   03/12/20 162 lb (73.5 kg)     Vitals:    09/09/20 0803   BP: 108/78   Pulse: 60   Resp: 20   Temp: 97.9 °F (36.6 °C)   TempSrc: Temporal   SpO2: 98%   Weight: 156 lb (70.8 kg)   Height: 5' 6\" (1.676 m)     Body mass index is 25.18 kg/m². Based upon direct observation of the patient, evaluation of cognition reveals recent and remote memory intact. General Appearance: alert and oriented to person, place and time, well-developed and well-nourished, in no acute distress  Pulmonary/Chest: clear to auscultation bilaterally- no wheezes, rales or rhonchi, normal air movement, no respiratory distress  Cardiovascular: normal rate, normal S1 and S2, no gallops and intact distal pulses  Abdomen: soft, non-tender and bowel sounds normal  Extremities: no cyanosis and no edema    Patient's complete Health Risk Assessment and screening values have been reviewed and are found in Flowsheets. The following problems were reviewed today and where indicated follow up appointments were made and/or referrals ordered.     Positive Risk Factor Screenings with Interventions:     Substance History:  Social History     Tobacco History     Smoking Status  Current Every Day Smoker Smoking Start Date  1/1/1968 Smoking Frequency  1 pack/day for 50 years (50 pk yrs) Smoking Tobacco Type  Cigarettes    Smokeless Tobacco Use  Never Used    Tobacco Comment  Does not want to quit. Alcohol History     Alcohol Use Status  No          Drug Use     Drug Use Status  No          Sexual Activity     Sexually Active  Not Asked               Alcohol Screening:       A score of 8 or more is associated with harmful or hazardous drinking. A score of 13 or more in women, and 15 or more in men, is likely to indicate alcohol dependence. Substance Abuse Interventions:  · Tobacco abuse:  tobacco cessation tips and resources provided    General Health:  General  In general, how would you say your health is?: Fair  In the past 7 days, have you experienced any of the following? New or Increased Pain, New or Increased Fatigue, Loneliness, Social Isolation, Stress or Anger?: None of These  Do you get the social and emotional support that you need?: Yes  Do you have a Living Will?: (!) No  General Health Risk Interventions:  · No Living Will: provided the state-specific advance directive document to the patient    Health Habits/Nutrition:  Health Habits/Nutrition  Do you exercise for at least 20 minutes 2-3 times per week?: (!) No  Have you lost any weight without trying in the past 3 months?: No  Do you eat fewer than 2 meals per day?: (!) Yes  Have you seen a dentist within the past year?: (!) No  Body mass index is 25.18 kg/m².   Health Habits/Nutrition Interventions:  · Inadequate physical activity:  educational materials provided to promote increased physical activity, patient is not ready to increase his/her physical activity level at this time  · Nutritional issues:  educational materials for healthy, well-balanced diet provided  · Dental exam overdue:  patient encouraged to make appointment with his/her dentist    Hearing/Vision:  No exam data present  Hearing/Vision  Do you or your family notice any trouble with your hearing?: No  Do you have difficulty driving, watching TV, or doing any of your daily activities because of your eyesight?: (!) Yes  Have you had an eye exam within the past year?: (!) No  Hearing/Vision Interventions:  · Vision concerns:  patient encouraged to make appointment with his/her eye specialist    Personalized Preventive Plan   Current Health Maintenance Status  Immunization History   Administered Date(s) Administered    Influenza Vaccine, unspecified formulation 10/17/2011, 10/22/2012, 10/10/2013, 10/03/2016    Influenza Virus Vaccine 10/17/2011, 10/22/2012, 10/10/2013, 10/03/2016    Influenza, High Dose (Fluzone 65 yrs and older) 10/12/2018, 10/11/2019    Pneumococcal Conjugate 13-valent ([id number]) 12/10/2014    Pneumococcal Polysaccharide ([id number]) 12/09/2019    Tdap (Boostrix, Adacel) 07/22/2020    Varicella (Varivax) 10/03/2016        Health Maintenance   Topic Date Due    Colon cancer screen colonoscopy  06/29/1999    Annual Wellness Visit (AWV)  06/17/2019    Flu vaccine (1) 09/01/2020    AAA screen  12/09/2020 (Originally 1950)    Shingles Vaccine (1 of 2) 07/22/2021 (Originally 11/28/2016)    Lipid screen  01/09/2021    Potassium monitoring  01/09/2021    Creatinine monitoring  01/09/2021    DTaP/Tdap/Td vaccine (2 - Td) 07/22/2030    Pneumococcal 65+ years Vaccine  Completed    Hepatitis A vaccine  Aged Out    Hepatitis B vaccine  Aged Out    Hib vaccine  Aged Out    Meningococcal (ACWY) vaccine  Aged Out    Hepatitis C screen  Discontinued    Low dose CT lung screening  Discontinued     Recommendations for Preventive Services Due: see orders and patient instructions/AVS.  . Recommended screening schedule for the next 5-10 years is provided to the patient in written form: see Patient Instructions/AVS.    Kristy Moran was seen today for medicare awv.     Diagnoses and all orders for this visit:    ACP (advance care planning)  -     [place] [address] [facility], [address] [facility] [[id number]]    Personal history of tobacco use, presenting hazards to health  -     [place] TOBAC[place] USE CESSATION INTERMEDIATE 3-10 MINUTES [29762]    Routine general medical examination at a health care facility  -     [facility] [address]  [facility] [[id number]]  -     [place] TOBACCO USE CESSATION INTERMEDIATE 3-10 MINUTES [86705]        Patient was advised to review his at home colon cancer screening test to make sure that it is a Cologuard. Patient will complete this. Records will need to be forwarded to myself. Patient was advised on obtaining a flu shot when available. Patient is up-to-date on all of his health maintenance issues. Advance Care Planning     Advanced Care Planning: Discussed the patients choices for care and treatment in case of a health event that adversely affects decision-making abilities. Also discussed the patients long-term treatment options. Reviewed with the patient the [address] [place] [address] Declaration forms  Reviewed the process of designating a competent adult as an Agent (or -in-fact) that could take make health care decisions for the patient if incompetent. Patient was asked to complete the declaration forms, either acknowledge the forms by a public notary or an eligible witness and provide a signed copy to the practice office. Time spent (minutes): 30     Cardiovascular Disease Risk Counseling: Assessed the patient's risk to develop cardiovascular disease and reviewed main risk factors.    Reviewed steps to reduce disease risk including:   · Quitting tobacco use, reducing amount smoked, or not starting the habit  · Making healthy food choices  · Being physically active and gradualy increasing activity levels   · Reduce weight and determine a healthy BMI goal  · Monitor blood pressure and treat if higher than 140/90 mmHg  · Maintain blood total cholesterol levels under 5 mmol/l or 190 mg/dl  · Maintain LDL cholesterol levels under 3.0 mmol/l or 115 mg/dl

## 2020-09-30 RX ORDER — RAMIPRIL 10 MG/1
10 CAPSULE ORAL DAILY
Qty: 90 CAPSULE | Refills: 1 | Status: SHIPPED
Start: 2020-09-30 | End: 2021-03-25 | Stop reason: SDUPTHER

## 2020-11-16 LAB
ALBUMIN SERPL-MCNC: 4.2 G/DL
ALP BLD-CCNC: 109 U/L
ALT SERPL-CCNC: 17 U/L
ANION GAP SERPL CALCULATED.3IONS-SCNC: NORMAL MMOL/L
AST SERPL-CCNC: 17 U/L
BASOPHILS ABSOLUTE: 72 /ΜL
BASOPHILS RELATIVE PERCENT: 0.9 %
BILIRUB SERPL-MCNC: 0.6 MG/DL (ref 0.1–1.4)
BUN BLDV-MCNC: 22 MG/DL
CALCIUM SERPL-MCNC: 9.2 MG/DL
CHLORIDE BLD-SCNC: 105 MMOL/L
CHOLESTEROL, TOTAL: 135 MG/DL
CHOLESTEROL/HDL RATIO: 2.3
CO2: 28 MMOL/L
CREAT SERPL-MCNC: 1.11 MG/DL
EOSINOPHILS ABSOLUTE: 128 /ΜL
EOSINOPHILS RELATIVE PERCENT: 1.6 %
GFR CALCULATED: 66
GLUCOSE BLD-MCNC: 107 MG/DL
HCT VFR BLD CALC: 44.1 % (ref 41–53)
HDLC SERPL-MCNC: 58 MG/DL (ref 35–70)
HEMOGLOBIN: 14.6 G/DL (ref 13.5–17.5)
LDL CHOLESTEROL CALCULATED: 63 MG/DL (ref 0–160)
LYMPHOCYTES ABSOLUTE: 1712 /ΜL
LYMPHOCYTES RELATIVE PERCENT: 21.4 %
MCH RBC QN AUTO: 29.6 PG
MCHC RBC AUTO-ENTMCNC: 33.1 G/DL
MCV RBC AUTO: 89.3 FL
MONOCYTES ABSOLUTE: 672 /ΜL
MONOCYTES RELATIVE PERCENT: 8.4 %
NEUTROPHILS ABSOLUTE: 5416 /ΜL
NEUTROPHILS RELATIVE PERCENT: 67.7 %
NONHDLC SERPL-MCNC: 77 MG/DL
PDW BLD-RTO: 12.6 %
PLATELET # BLD: 132 K/ΜL
PMV BLD AUTO: 12.2 FL
POTASSIUM SERPL-SCNC: 4.5 MMOL/L
RBC # BLD: 4.94 10^6/ΜL
SODIUM BLD-SCNC: 139 MMOL/L
TOTAL PROTEIN: 6.9
TRIGL SERPL-MCNC: 56 MG/DL
TSH SERPL DL<=0.05 MIU/L-ACNC: 2.63 UIU/ML
VLDLC SERPL CALC-MCNC: NORMAL MG/DL
WBC # BLD: 8 10^3/ML

## 2020-11-23 ENCOUNTER — OFFICE VISIT (OUTPATIENT)
Dept: PRIMARY CARE CLINIC | Age: 70
End: 2020-11-23
Payer: MEDICARE

## 2020-11-23 VITALS
TEMPERATURE: 97.4 F | HEIGHT: 66 IN | BODY MASS INDEX: 27 KG/M2 | WEIGHT: 168 LBS | RESPIRATION RATE: 24 BRPM | SYSTOLIC BLOOD PRESSURE: 122 MMHG | OXYGEN SATURATION: 98 % | HEART RATE: 70 BPM | DIASTOLIC BLOOD PRESSURE: 68 MMHG

## 2020-11-23 PROCEDURE — 99214 OFFICE O/P EST MOD 30 MIN: CPT | Performed by: FAMILY MEDICINE

## 2020-11-23 RX ORDER — METOPROLOL SUCCINATE 50 MG/1
50 TABLET, EXTENDED RELEASE ORAL 2 TIMES DAILY
Qty: 180 TABLET | Refills: 2 | Status: SHIPPED
Start: 2020-11-23 | End: 2021-07-28 | Stop reason: SDUPTHER

## 2020-11-23 RX ORDER — AMLODIPINE BESYLATE 10 MG/1
10 TABLET ORAL DAILY
Qty: 90 TABLET | Refills: 2 | Status: SHIPPED
Start: 2020-11-23 | End: 2021-07-28 | Stop reason: SDUPTHER

## 2020-11-23 RX ORDER — ATORVASTATIN CALCIUM 80 MG/1
40 TABLET, FILM COATED ORAL NIGHTLY
Qty: 90 TABLET | Refills: 1 | Status: ON HOLD
Start: 2020-11-23 | End: 2021-06-02 | Stop reason: SDUPTHER

## 2020-11-23 ASSESSMENT — ENCOUNTER SYMPTOMS
SHORTNESS OF BREATH: 0
NAUSEA: 0
WHEEZING: 0
ABDOMINAL PAIN: 0
SORE THROAT: 0
VOMITING: 0
CONSTIPATION: 0
DIARRHEA: 0
RHINORRHEA: 0

## 2020-11-23 NOTE — PROGRESS NOTES
2020     Chief Complaint   Patient presents with    Coronary Artery Disease     check up     HPI  Megan Orozco (:  1950) is a 69 y.o. male, here for evaluation of the following medical concerns:    Selena Goldman is a 76-year-old male with a past medical history of hypertension, hyperlipidemia, history of coronary artery disease, carotid artery disease and left sided CEA, COPD, current smoker, CKD stage II, nonrheumatic aortic valve stenosis     HTN: Stable, below 150/90 which is his goal. Patient is currently on amlodipine 10 mg daily, ramipril 10 mg daily, Metoprolol ER 50 mg twice daily.     HLD: Atorvastatin 40mg daily due to the SE of fatigue on higher dose.      CAD: No recent acute exacerbations. Patient is currently on blood pressure control with amlodipine 10 mg daily. ACE inhibitor: Ramipril 10 mg daily, beta blocker: metoprolol extended release 50 mg twice daily. Patient is also on aspirin 325 mg daily. Statin, atorvastatin 40 mg daily. Patient has not seen cardiology for 10+ years and reports he was told he did not need to continue to see cardiology.      Carotid Artery Disease w/ TIA:  Fairly stable carotid artery stenosis and left CEA. Repeat US, .     Left sided CEA: Following w/ vascular surgery as noted above.     COPD:  Positive PFT showing moderate airflow obstruction and COPD. Patient reports that he is largely asymptomatic unless he undergoes significant exertion in which he will feel out of breath after such. Patient denies any chest pain, palpitations, significant shift lightheadedness or dizziness.     Current Smoker: Patient is reluctant to discuss quitting.     CKD Stage II: Baseline creatinine of 1.2-1.4.  Stable.     Nonrheumatic Severe Aortic Valve Stenosis:  Patient recently had a echo performed.  Showed mild left ventricular hypertrophy.  Did not meet criteria for diastolic dysfunction.  EF was approximately 65%.  Patient does have aortic valve moderately sclerotic.  Moderate aortic stenosis present.     Repeat Echo due in March 2021.     GERD: Otc antacids. Reports no sig. issues     Labs: CMP within normal limits with a creatinine of 1.1, total cholesterol 135, HDL 58, LDL 63, CBC within normal limits, TSH within normal limits. Review of Systems   Constitutional: Negative for chills and fever. HENT: Negative for congestion, rhinorrhea and sore throat. Respiratory: Negative for shortness of breath and wheezing. Cardiovascular: Negative for chest pain and leg swelling. Gastrointestinal: Negative for abdominal pain, constipation, diarrhea, nausea and vomiting. Musculoskeletal: Positive for arthralgias. Skin: Negative for rash. Neurological: Negative for light-headedness and headaches. Past Medical History:   Diagnosis Date    Aortic stenosis, severe 7/6/2018    Aortic valve disorder     Arthritis     CAD (coronary artery disease)     Chronic kidney disease     stage 2    COPD (chronic obstructive pulmonary disease) (HCC)     Folic acid deficiency     GERD (gastroesophageal reflux disease)     Hyperlipidemia     Hypertension     Symptomatic stenosis of left carotid artery without infarction 07/05/2018    Tobacco dependence     Transient cerebral ischemia     Vitamin B 12 deficiency        Prior to Visit Medications    Medication Sig Taking?  Authorizing Provider   atorvastatin (LIPITOR) 80 MG tablet Take 0.5 tablets by mouth nightly Yes Naif Conner MD   metoprolol succinate (TOPROL XL) 50 MG extended release tablet Take 1 tablet by mouth 2 times daily Yes Naif Conner MD   amLODIPine (NORVASC) 10 MG tablet Take 1 tablet by mouth daily Yes Naif Conner MD   ramipril (ALTACE) 10 MG capsule Take 1 capsule by mouth daily Yes Naif Conner MD   aspirin 325 MG tablet Take 1 tablet by mouth daily Yes Naif Conner MD   Cyanocobalamin 2500 MCG SUBL Place 2,500 mcg under the tongue daily Yes Naif Conner MD No Known Allergies    Social History     Tobacco Use    Smoking status: Current Every Day Smoker     Packs/day: 1.00     Years: 50.00     Pack years: 50.00     Types: Cigarettes     Start date: 1968    Smokeless tobacco: Never Used    Tobacco comment: Does not want to quit. Substance Use Topics    Alcohol use: No           Vitals:    11/23/20 1050   BP: 122/68   Pulse: 70   Resp: 24   Temp: 97.4 °F (36.3 °C)   TempSrc: Temporal   SpO2: 98%   Weight: 168 lb (76.2 kg)   Height: 5' 6\" (1.676 m)     Estimated body mass index is 27.12 kg/m² as calculated from the following:    Height as of this encounter: 5' 6\" (1.676 m). Weight as of this encounter: 168 lb (76.2 kg). Physical Exam  Constitutional:       Appearance: He is well-developed. HENT:      Head: Normocephalic. Eyes:      Extraocular Movements: Extraocular movements intact. Conjunctiva/sclera: Conjunctivae normal.      Pupils: Pupils are equal, round, and reactive to light. Cardiovascular:      Rate and Rhythm: Normal rate and regular rhythm. Heart sounds: Murmur present. Pulmonary:      Effort: Pulmonary effort is normal.      Breath sounds: Normal breath sounds. No wheezing or rales. Abdominal:      General: Bowel sounds are normal.      Palpations: Abdomen is soft. Tenderness: There is no abdominal tenderness. Musculoskeletal:      Right lower leg: No edema. Left lower leg: No edema. Neurological:      General: No focal deficit present. Mental Status: He is alert. Comments: Cranial nerves grossly intact   Psychiatric:         Mood and Affect: Mood normal.         Judgment: Judgment normal.         ASSESSMENT/PLAN:  Tamanna Chogn was seen today for coronary artery disease. Diagnoses and all orders for this visit:    Tobacco dependence  Comments:  Precontemplative. Continues to smoke 1 pack/day. Tobacco cessation advised. Stage 2 chronic kidney disease  Comments:  Stable on recent testing.   Continue to follow. Avoid nephrotoxic drugs. Panlobular emphysema ([address]  Comments:  Patient had positive PFTs. Declining any significant issues with his breathing. Consider controller medication in near future. Gastroesophageal reflux disease, unspecified whether esophagitis present  Comments:  OTC antacids. Stable. No issues. Hemispheric carotid artery syndrome  Comments:  Asymptomatic. Following with vascular surgery. Mixed hyperlipidemia  Comments:  Lipid panel appropriate. Tolerating statin medication without any side effects. Essential hypertension  Comments:  Stable. Controlled. Blood pressure less than his goal of 150/90. Orders:  -     metoprolol succinate (TOPROL XL) 50 MG extended release tablet; Take 1 tablet by mouth 2 times daily  -     amLODIPine (NORVASC) 10 MG tablet; Take 1 tablet by mouth daily    Coronary artery disease involving native coronary artery of native heart, angina presence unspecified  Comments:  Stable. No symptoms of ACS at this time. Patient was advised to follow-up with cardiology in regards this issue but declined. On optimal therapy. Orders:  -     atorvastatin (LIPITOR) 80 MG tablet; Take 0.5 tablets by mouth nightly  -     metoprolol succinate (TOPROL XL) 50 MG extended release tablet; Take 1 tablet by mouth 2 times daily  -     amLODIPine (NORVASC) 10 MG tablet; Take 1 tablet by mouth daily    Nonrheumatic aortic valve stenosis  Comments:  Moderate on recent echo. Repeat echo March 2021. Patient will need repeat echoes Q 6 to 12 months. Right arm pain  Comments:  Arthitis. Trial of tylenol, OTC pain creams. Patient in the past was notified to follow-up with cardiology in regards to his history of CAD. Patient has declined. Continue current work-up with echo and March 2021. Patient will also need an EKG at this time. Patient is to continue to follow with vascular surgery with yearly carotid ultrasounds.     Return in about 4 months (around 3/23/2021) for Routine Follow-up of Chronic Conditions. An electronicsignature was used to authenticate this note.     --Roni Henriquez MD on 11/23/20 at 8:10 AM EST

## 2021-03-25 ENCOUNTER — OFFICE VISIT (OUTPATIENT)
Dept: PRIMARY CARE CLINIC | Age: 71
End: 2021-03-25
Payer: MEDICARE

## 2021-03-25 VITALS
DIASTOLIC BLOOD PRESSURE: 70 MMHG | TEMPERATURE: 97.8 F | WEIGHT: 162 LBS | RESPIRATION RATE: 24 BRPM | OXYGEN SATURATION: 100 % | SYSTOLIC BLOOD PRESSURE: 102 MMHG | HEART RATE: 60 BPM | BODY MASS INDEX: 26.03 KG/M2 | HEIGHT: 66 IN

## 2021-03-25 DIAGNOSIS — N18.2 STAGE 2 CHRONIC KIDNEY DISEASE: ICD-10-CM

## 2021-03-25 DIAGNOSIS — F17.200 TOBACCO DEPENDENCE: Primary | ICD-10-CM

## 2021-03-25 DIAGNOSIS — I25.10 CORONARY ARTERY DISEASE INVOLVING NATIVE CORONARY ARTERY OF NATIVE HEART, ANGINA PRESENCE UNSPECIFIED: ICD-10-CM

## 2021-03-25 DIAGNOSIS — J43.1 PANLOBULAR EMPHYSEMA (HCC): ICD-10-CM

## 2021-03-25 DIAGNOSIS — Z13.6 ENCOUNTER FOR ABDOMINAL AORTIC ANEURYSM (AAA) SCREENING: ICD-10-CM

## 2021-03-25 DIAGNOSIS — K21.9 GASTROESOPHAGEAL REFLUX DISEASE, UNSPECIFIED WHETHER ESOPHAGITIS PRESENT: ICD-10-CM

## 2021-03-25 DIAGNOSIS — G45.1 HEMISPHERIC CAROTID ARTERY SYNDROME: ICD-10-CM

## 2021-03-25 DIAGNOSIS — I10 ESSENTIAL HYPERTENSION: ICD-10-CM

## 2021-03-25 DIAGNOSIS — E78.2 MIXED HYPERLIPIDEMIA: ICD-10-CM

## 2021-03-25 DIAGNOSIS — I35.0 NONRHEUMATIC AORTIC VALVE STENOSIS: ICD-10-CM

## 2021-03-25 DIAGNOSIS — E55.9 VITAMIN D DEFICIENCY: ICD-10-CM

## 2021-03-25 PROCEDURE — 99215 OFFICE O/P EST HI 40 MIN: CPT | Performed by: FAMILY MEDICINE

## 2021-03-25 RX ORDER — RAMIPRIL 10 MG/1
10 CAPSULE ORAL DAILY
Qty: 90 CAPSULE | Refills: 1 | Status: SHIPPED
Start: 2021-03-25 | End: 2021-07-28 | Stop reason: SDUPTHER

## 2021-03-25 ASSESSMENT — ENCOUNTER SYMPTOMS
SORE THROAT: 0
RHINORRHEA: 0
DIARRHEA: 0
NAUSEA: 0
WHEEZING: 0
VOMITING: 0
BACK PAIN: 0
ABDOMINAL PAIN: 0
CONSTIPATION: 0
SHORTNESS OF BREATH: 0

## 2021-03-25 ASSESSMENT — PATIENT HEALTH QUESTIONNAIRE - PHQ9: SUM OF ALL RESPONSES TO PHQ QUESTIONS 1-9: 0

## 2021-03-25 NOTE — PROGRESS NOTES
3/25/2021     Chief Complaint   Patient presents with    Hypertension     check up      HPI  Gideondina Rose (:  1950) is a 69 y.o. male, here for evaluation of the following medical concerns:    Pt is a 79-year-old male with a past medical history of hypertension, hyperlipidemia, history of coronary artery disease, carotid artery disease and left sided CEA, COPD, current smoker, CKD stage II, nonrheumatic aortic valve stenosis     HTN: Stable, below 150/90 which is his goal. Patient is currently on amlodipine 10 mg daily, ramipril 10 mg daily, Metoprolol ER 50 mg twice daily.     HLD: Atorvastatin 40mg daily due to the SE of fatigue on higher dose.      CAD: No recent acute exacerbations. Patient is currently on blood pressure control with amlodipine 10 mg daily. ACE inhibitor: Ramipril 10 mg daily, beta blocker: metoprolol extended release 50 mg twice daily. Patient is also on aspirin 325 mg daily. Statin, atorvastatin 40 mg daily. Patient has not seen cardiology for 10+ years and reports he was told he did not need to continue to see cardiology.      Carotid Artery Disease w/ TIA:  Fairly stable carotid artery stenosis and left CEA. Repeat US, this year.     Left sided CEA: Following w/ vascular surgery as noted above. Due for repeat US.      COPD:  Positive PFT showing moderate airflow obstruction and COPD. Patient reports that he is largely asymptomatic unless he undergoes significant exertion in which he will feel out of breath after such. Patient denies any chest pain, palpitations, significant shift lightheadedness or dizziness.  Not currently on any controller medications.      Current Smoker: Patient is reluctant to discuss quitting.     CKD Stage II: Baseline creatinine of 1.2-1.4.     Nonrheumatic Severe Aortic Valve Stenosis:  Patient recently had a echo performed.  Showed mild left ventricular hypertrophy.  Did not meet criteria for diastolic dysfunction.  EF was approximately 65%.  Patient does have aortic valve moderately sclerotic.  Moderate aortic stenosis present.     Repeat Echo due in March 2021.     GERD: Otc antacids. Reports no sig. issues    Labs: Repeat labs due prior to next apt. Review of Systems   Constitutional: Negative for chills and fever. HENT: Negative for congestion, rhinorrhea and sore throat. Respiratory: Negative for shortness of breath and wheezing. Cardiovascular: Negative for chest pain and leg swelling. Gastrointestinal: Negative for abdominal pain, constipation, diarrhea, nausea and vomiting. Genitourinary: Negative for dysuria, frequency and urgency. Musculoskeletal: Negative for arthralgias and back pain. Skin: Negative for rash. Neurological: Negative for light-headedness and headaches. Past Medical History:   Diagnosis Date    Aortic stenosis, severe 7/6/2018    Aortic valve disorder     Arthritis     CAD (coronary artery disease)     Chronic kidney disease     stage 2    [facility]ch[facility] ([facility])     Folic acid deficiency     GERD (gastroesophageal reflux disease)     Hyperlipidemia     Hypertension     Symptomatic stenosis of left carotid artery without infarction 07/05/2018    Tobacco dependence     Transient cerebral ischemia     Vitamin B 12 deficiency        Prior to Visit Medications    Medication Sig Taking?  Authorizing Provider   ramipril (ALTACE) 10 MG capsule Take 1 capsule by mouth daily Yes Boo Brown MD   atorvastatin (LIPITOR) 80 MG tablet Take 0.5 tablets by mouth nightly Yes Boo Brown MD   metoprolol succinate (TOPROL XL) 50 MG extended release tablet Take 1 tablet by mouth 2 times daily Yes Boo Brown MD   amLODIPine (NORVASC) 10 MG tablet Take 1 tablet by mouth daily Yes Boo Brown MD   aspirin 325 MG tablet Take 1 tablet by mouth daily Yes Boo Brown MD   Cyanocobalamin 2500 MCG SUBL Place 2,500 mcg under the tongue daily Yes Boo Brown MD No Known Allergies    Social History     Tobacco Use    Smoking status: Current Every Day Smoker     Packs/day: 1.00     Years: 50.00     Pack years: 50.00     Types: Cigarettes     Start date: 1968    Smokeless tobacco: Never Used    Tobacco comment: Does not want to quit. Substance Use Topics    Alcohol use: No           Vitals:    03/25/21 1138   BP: 102/70   Pulse: 60   Resp: 24   Temp: 97.8 °F (36.6 °C)   TempSrc: Temporal   SpO2: 100%   Weight: 162 lb (73.5 kg)   Height: 5' 6\" (1.676 m)     Estimated body mass index is 26.15 kg/m² as calculated from the following:    Height as of this encounter: 5' 6\" (1.676 m). Weight as of this encounter: 162 lb (73.5 kg). Physical Exam  Constitutional:       Appearance: He is well-developed. HENT:      Head: Normocephalic. Right Ear: Tympanic membrane normal.      Left Ear: Tympanic membrane normal.   Eyes:      Extraocular Movements: Extraocular movements intact. Conjunctiva/sclera: Conjunctivae normal.      Pupils: Pupils are equal, round, and reactive to light. Cardiovascular:      Rate and Rhythm: Normal rate and regular rhythm. Heart sounds: Normal heart sounds. No murmur. Pulmonary:      Effort: Pulmonary effort is normal.      Breath sounds: Normal breath sounds. No wheezing or rales. Abdominal:      General: Bowel sounds are normal.      Palpations: Abdomen is soft. Tenderness: There is no abdominal tenderness. Musculoskeletal:      Right lower leg: No edema. Left lower leg: No edema. Skin:     General: Skin is warm. Findings: No rash. Neurological:      General: No focal deficit present. Mental Status: He is alert. Comments: Cranial nerves grossly intact   Psychiatric:         Mood and Affect: Mood normal.         Judgment: Judgment normal.         ASSESSMENT/PLAN:  Suresh Or was seen today for hypertension.     Diagnoses and all orders for this visit:    Tobacco dependence  -     US SCREENING FOR AAA; Future  Per contemplative. Continues to smoke 1 pack per day. Tobacco cessation advised. Does have a smoker's cough. Stage 2 chronic kidney disease  Creatinine is stable at baseline of 1.2 and 1.4. Patient has been advised to avoid nephrotoxic drugs. Repeat testing prior to next appointment. Panlobular emphysema (HCC)  Positive PFTs. Patient denies being symptomatic. No shortness of breath, wheezing. Patient may need a controller medication in the future. Rescue inhaler when necessary. Patient rarely uses a rescue inhaler. Gastroesophageal reflux disease, unspecified whether esophagitis present  Stable. Patient does use OTC antacids without any issue. Mixed hyperlipidemia  -     Lipid Panel; Future  Patient is tolerating his statin medication well without any side effects. Patient reports compliance. Most recent lipid panel has been appropriate. Repeat lipid panel prior to next appoint. Essential hypertension  -     ramipril (ALTACE) 10 MG capsule; Take 1 capsule by mouth daily  -     CBC Auto Differential; Future  -     Comprehensive Metabolic Panel; Future  -     Microalbumin / Creatinine Urine Ratio; Future  Blood pressure today is well controlled. Patient is tolerating his blood pressure medications well without any side effects. Patient denies any hypotension or symptoms suggestive of such. Blood pressure goals to be less than 140/90. Coronary artery disease involving native coronary artery of native heart, angina presence unspecified  -     ramipril (ALTACE) 10 MG capsule; Take 1 capsule by mouth daily  -     ECHO Complete 2D W Doppler W Color; Future  Patient does not follow with cardiology regularly. Symptoms are stable. Patient denies any ACS symptoms. Patient is on optimal therapy at this time. Patient is tolerating all his medications well without any side effects or issues. Echo repeat ordered.     Nonrheumatic aortic valve stenosis  -     ECHO Complete 2D W Doppler W Color; Future  Findings of previous echo as noted above. Patient is due for repeat. Hemispheric carotid artery syndrome  Patient does follow with vascular surgery. Patient will be scheduled for a repeat carotid ultrasound in approximately 2-3 weeks and follow-up with vascular surgery for this issue. Patient is asymptomatic in regards to coronary artery stenosis/syndrome. Vitamin D deficiency  -     Vitamin D 25 Hydroxy; Future    Encounter for abdominal aortic aneurysm (AAA) screening  -     US SCREENING FOR AAA; Future    Health maintenance: AAA screening today. We'll address colonoscopy screening at next appointment. Lifestyle modifications reviewed and advise. Medication refills: Ramipril 10 mg daily, atorvastatin 40 mg daily, metoprolol extended release tablet 50 mg twice daily, amlodipine 10 mg daily, aspirin 325 mg daily, vitamin B12 2500 µg daily. Greater then 40 mins spent in total on this encounter. Return in about 4 months (around 7/25/2021). An electronicsignature was used to authenticate this note.     --Gillis Leventhal, MD on 3/25/21 at 9:36 AM EDT

## 2021-04-08 ENCOUNTER — HOSPITAL ENCOUNTER (OUTPATIENT)
Dept: NON INVASIVE DIAGNOSTICS | Age: 71
Discharge: HOME | End: 2021-04-08
Payer: MEDICARE

## 2021-04-08 ENCOUNTER — APPOINTMENT (OUTPATIENT)
Dept: ULTRASOUND IMAGING | Age: 71
End: 2021-04-08
Payer: MEDICARE

## 2021-04-08 DIAGNOSIS — I25.10 CORONARY ARTERY DISEASE INVOLVING NATIVE CORONARY ARTERY OF NATIVE HEART, ANGINA PRESENCE UNSPECIFIED: ICD-10-CM

## 2021-04-08 DIAGNOSIS — I35.0 NONRHEUMATIC AORTIC VALVE STENOSIS: ICD-10-CM

## 2021-04-08 LAB
LV EF: 65 %
LVEF MODALITY: NORMAL

## 2021-04-08 PROCEDURE — 93306 TTE W/DOPPLER COMPLETE: CPT

## 2021-04-12 DIAGNOSIS — I35.0 AORTIC VALVE STENOSIS, ETIOLOGY OF CARDIAC VALVE DISEASE UNSPECIFIED: Primary | ICD-10-CM

## 2021-04-13 ENCOUNTER — TELEPHONE (OUTPATIENT)
Dept: ADMINISTRATIVE | Age: 71
End: 2021-04-13

## 2021-04-13 ENCOUNTER — HOSPITAL ENCOUNTER (OUTPATIENT)
Dept: ULTRASOUND IMAGING | Age: 71
Discharge: HOME | End: 2021-04-15
Payer: MEDICARE

## 2021-04-13 DIAGNOSIS — F17.200 TOBACCO DEPENDENCE: ICD-10-CM

## 2021-04-13 DIAGNOSIS — Z13.6 ENCOUNTER FOR ABDOMINAL AORTIC ANEURYSM (AAA) SCREENING: ICD-10-CM

## 2021-04-13 PROCEDURE — 76706 US ABDL AORTA SCREEN AAA: CPT

## 2021-04-13 NOTE — TELEPHONE ENCOUNTER
Patient Appointment Form:      PCP: Dr. Alok Stoddard  Referring: same    Has the Patient:    Seen a Cardiologist? yes    date:7/4/2018   Physician:Dr. Dr. Elizabeth Zhu    Had a heart catheterization? yes   date: June 2008  Hospital:  [facility]    Had heart surgery? yes   date:  [id number]   surgeon: can't recall   hospital name:  [facility]    Had a stress test or nuclear stress test? other: spouse doesn't think he has ever had one    Had an echocardiogram? yes   date: 4/8/21   facility name:  [facility]    Had a vascular ultrasound?  yes   date: 04/13/21   facility [facility]:  [facility]    Had a 24/48 heart monitor or extended cardiac event monitor? no    Had recent blood work in the last 6 months? yes    date: 11/16/20    ordering physician: Dr. Alok Stoddard    Had a pacemaker/ICD/ILR implant? no    Seen an Electrophysiologist? no      Will send records via: fax - [phone] could have old cardiology records from Dr. Jaimie Ornelas    Date & time of appointment:  4/29/21 9:00 Dr. Faiza Benoit

## 2021-04-29 ENCOUNTER — OFFICE VISIT (OUTPATIENT)
Dept: CARDIOLOGY CLINIC | Age: 71
End: 2021-04-29
Payer: MEDICARE

## 2021-04-29 VITALS
DIASTOLIC BLOOD PRESSURE: 70 MMHG | HEART RATE: 61 BPM | RESPIRATION RATE: 16 BRPM | SYSTOLIC BLOOD PRESSURE: 112 MMHG | WEIGHT: 160 LBS | BODY MASS INDEX: 25.71 KG/M2 | HEIGHT: 66 IN

## 2021-04-29 DIAGNOSIS — R06.09 DOE (DYSPNEA ON EXERTION): ICD-10-CM

## 2021-04-29 DIAGNOSIS — E78.00 PURE HYPERCHOLESTEROLEMIA: ICD-10-CM

## 2021-04-29 DIAGNOSIS — R06.02 SHORTNESS OF BREATH: ICD-10-CM

## 2021-04-29 DIAGNOSIS — I35.0 NONRHEUMATIC AORTIC VALVE STENOSIS: Primary | ICD-10-CM

## 2021-04-29 LAB — PRO-BNP: 1023 PG/ML (ref 0–125)

## 2021-04-29 PROCEDURE — 99204 OFFICE O/P NEW MOD 45 MIN: CPT | Performed by: INTERNAL MEDICINE

## 2021-04-29 PROCEDURE — 93000 ELECTROCARDIOGRAM COMPLETE: CPT | Performed by: INTERNAL MEDICINE

## 2021-04-29 PROCEDURE — 36415 COLL VENOUS BLD VENIPUNCTURE: CPT | Performed by: INTERNAL MEDICINE

## 2021-04-29 RX ORDER — MULTIVITAMIN WITH IRON
250 TABLET ORAL DAILY
COMMUNITY

## 2021-04-29 NOTE — PROGRESS NOTES
Patient Active Problem List   Diagnosis    Vitamin B 12 deficiency    Folic acid deficiency    Hypertension    Hyperlipidemia    Tobacco dependence    Aortic valve stenosis    Coronary arteriosclerosis    Hemispheric carotid artery syndrome    Symptomatic stenosis of left carotid artery without infarction    GERD (gastroesophageal reflux disease)    Chronic kidney disease    [facility] ([facility])       Current Outpatient Medications   Medication Sig Dispense Refill    magnesium (MAGNESIUM-OXIDE) 250 MG TABS tablet Take 250 mg by mouth daily      Cyanocobalamin (VITAMIN B12 PO) Take 2,500 mcg by mouth daily      ramipril (ALTACE) 10 MG capsule Take 1 capsule by mouth daily 90 capsule 1    atorvastatin (LIPITOR) 80 MG tablet Take 0.5 tablets by mouth nightly 90 tablet 1    metoprolol succinate (TOPROL XL) 50 MG extended release tablet Take 1 tablet by mouth 2 times daily 180 tablet 2    amLODIPine (NORVASC) 10 MG tablet Take 1 tablet by mouth daily 90 tablet 2    aspirin 325 MG tablet Take 1 tablet by mouth daily 90 tablet 2     No current facility-administered medications for this visit. CC:    Patient is seen in Initial Evaluation for:  1. Nonrheumatic aortic valve stenosis    2. Pure hypercholesterolemia    3. SEGURA (dyspnea on exertion)    4. Shortness of breath         HPI:  Patient is seen in initial evaluation after finding of moderate to severe aortic valve stenosis on recent echo. Patient is limited in his activities secondary to his back but denies any chest pain, unusual shortness of breath, or syncope. He does have underlying COPD and has had a prior left-sided carotid endarterectomy. He also has renal insufficiency.     ROS:   General: No unusual weight gain, no change in exercise tolerance  Skin: No rash or itching  EENT: No vision changes or nosebleeds  Cardiovascular: No orthopnea or paroxysmal nocturnal dyspnea  Respiratory: No cough or hemoptysis  Gastrointestinal: No hematemesis or recent changes in bowel habits  Genitourinary: No hematuria, urgency or frequency  Musculoskeletal: No muscular weakness or joint swelling   Neurologic / Psychiatric: No incoordination or convulsions  Allergic / Immunologic/ Lymphatic / Endocrine: No anemia or bleeding tendency    Social History     Socioeconomic History    Marital status:      Spouse name: Not on file    Number of children: Not on file    Years of education: Not on file    Highest education level: Not on file   Occupational History    Not on file   Social Needs    Financial resource strain: Not on file    Food insecurity     Worry: Not on file     Inability: Not on file    Transportation needs     Medical: Not on file     Non-medical: Not on file   Tobacco Use    Smoking status: Current Every Day Smoker     Packs/day: 1.00     Years: 50.00     Pack years: 50.00     Types: Cigarettes     Start date: 1968    Smokeless tobacco: Never Used    Tobacco comment: Does not want to quit.     Substance and Sexual Activity    Alcohol use: No    Drug use: No    Sexual activity: Not on file   Lifestyle    Physical activity     Days per week: Not on file     Minutes per session: Not on file    Stress: Not on file   Relationships    Social connections     Talks on phone: Not on file     Gets together: Not on file     Attends Synagogue service: Not on file     Active member of club or organization: Not on file     Attends meetings of clubs or organizations: Not on file     Relationship status: Not on file    Intimate partner violence     Fear of current or ex partner: Not on file     Emotionally abused: Not on file     Physically abused: Not on file     Forced sexual activity: Not on file   Other Topics Concern    Not on file   Social History Narrative    Not on file       Family History   Problem Relation Age of Onset    High Blood Pressure Mother     Stroke Father     High Blood Pressure Father     Breast Cancer Sister     Other Sister        Past Medical History:   Diagnosis Date    Aortic stenosis, severe 7/6/2018    Aortic valve disorder     Arthritis     CAD (coronary artery disease)     Chronic kidney disease     stage 2    COPD (chronic obstructive pulmonary disease) (HCC)     Folic acid deficiency     GERD (gastroesophageal reflux disease)     Hyperlipidemia     Hypertension     Symptomatic stenosis of left carotid artery without infarction 07/05/2018    Tobacco dependence     Transient cerebral ischemia     Vitamin B 12 deficiency        PHYSICAL EXAM:  CONSTITUTIONAL:  Well developed, well nourished    Vitals:    04/29/21 0856   BP: 112/70   Pulse: 61   Resp: 16   Weight: 160 lb (72.6 kg)   Height: 5' 6\" (1.676 m)     HEAD & FACE: Normocephalic. Symmetric. EYES: No xanthelasma. Conjunctivae not injected. EARS, NOSE, MOUTH & THROAT:  No oral pallor or cyanosis. NECK: No JVD at 30 degrees. No thyromegaly. RESPIRATORY: Clear to auscultation and percussion in all fields. No use of accessory muscle or intercostal retractions. CARDIOVASCULAR: Regular rate and rhythm. No lifts or thrills on palpitation. Auscultation with normal S1-S2 in intensity and splitting. Grade 2/6 to 3/6 mid-to-late peaking systolic ejection murmur heard best retrosternal border without radiation. No carotid bruits. Abdominal aorta not enlarged. Femoral arteries without bruits. Pedal pulses 1+. No edema. ABDOMEN: Soft without hepatic or splenic enlargement. No tenderness. MUSCULOSKELETAL: No kyphosis or scoliosis of the back. Good muscle strength and tone. No muscle atrophy. Slow gait and inability to undergo exercise stress testing. EXTREMITIES: No clubbing or cyanosis. SKIN: No Xanthomas or ulcerations. NEUROLOGIC: Oriented to time, place and person. Normal mood and affect. LYMPHATIC:  No palpable neck or supraclavicular nodes. No splenomegaly.      EKG: the EKG tracing was reviewed and found to reveal: Normal sinus rhythm. Nonspecific ST-T wave changes. QTc 403 ms. No change compared to prior tracing. ASSESSMENT:                                                     ORDERS:       Diagnosis Orders   1. Nonrheumatic aortic valve stenosis  EKG 12 Lead    Brain Natriuretic Peptide   2. Pure hypercholesterolemia     3. SEGURA (dyspnea on exertion)     4. Shortness of breath   Brain Natriuretic Peptide   Aortic valve stenosis nearing placement parameters. PLAN:   See above orders. Medication reconciliation completed. Old records were reviewed and found to reveal: Creatinine 1.11 noted on 11/16/2020. Will check BNP to risk assess. Unable to complete exercise treadmill test.  Discussed issues that would prompt earlier evaluation. Same cardiac medications. Follow-up office visit in 6 months.

## 2021-04-30 ENCOUNTER — TELEPHONE (OUTPATIENT)
Dept: CARDIOLOGY CLINIC | Age: 71
End: 2021-04-30

## 2021-04-30 DIAGNOSIS — I35.0 NONRHEUMATIC AORTIC VALVE STENOSIS: Primary | ICD-10-CM

## 2021-04-30 NOTE — TELEPHONE ENCOUNTER
Patients wife notified and instructed on lab  results and evaluation of the valve clinic. She stated understanding.

## 2021-04-30 NOTE — TELEPHONE ENCOUNTER
----- Message from Denise Kaur MD sent at 4/29/2021  6:34 PM EDT -----  Please let patient or wife know that blood test obtained for development of congestive heart failure was significantly elevated. Would like him to be evaluated at the Valve Clinic as well for their opinion.

## 2021-05-13 ENCOUNTER — OFFICE VISIT (OUTPATIENT)
Dept: CARDIOLOGY CLINIC | Age: 71
End: 2021-05-13
Payer: MEDICARE

## 2021-05-13 ENCOUNTER — OFFICE VISIT (OUTPATIENT)
Dept: CARDIOTHORACIC SURGERY | Age: 71
End: 2021-05-13
Payer: MEDICARE

## 2021-05-13 VITALS
BODY MASS INDEX: 25.71 KG/M2 | HEIGHT: 66 IN | HEART RATE: 56 BPM | DIASTOLIC BLOOD PRESSURE: 72 MMHG | SYSTOLIC BLOOD PRESSURE: 132 MMHG | RESPIRATION RATE: 24 BRPM | WEIGHT: 160 LBS

## 2021-05-13 DIAGNOSIS — I35.0 SEVERE AORTIC STENOSIS: Primary | ICD-10-CM

## 2021-05-13 DIAGNOSIS — I35.0 NONRHEUMATIC AORTIC VALVE STENOSIS: Primary | ICD-10-CM

## 2021-05-13 PROCEDURE — 99215 OFFICE O/P EST HI 40 MIN: CPT | Performed by: INTERNAL MEDICINE

## 2021-05-13 PROCEDURE — 99204 OFFICE O/P NEW MOD 45 MIN: CPT | Performed by: THORACIC SURGERY (CARDIOTHORACIC VASCULAR SURGERY)

## 2021-05-13 NOTE — PROGRESS NOTES
[facility]  Visit Note      Patient name: Westley Munguia    Reason for consult: severe AS    Referring Physician:  [facility]     Primary Care Physician: Ann Anne MD    Date of service: 5/13/2021    Chief Complaint: SEGURA    HPI:   This is a 21-year-old male presenting for evaluation of aortic stenosis. His PMH includes hypertension, hyperlipidemia, history of coronary artery disease s/p CABG in 2008, carotid artery disease and left sided CEA, COPD, current smoker, CKD stage II, nonrheumatic aortic valve stenosis. His most recent TTE was completed on 4/8/21. The patient denies any CP, palpitations, or any major complaints. He does report SEGURA with strenuous activity only. Allergies: No Known Allergies    Home medications:    Current Outpatient Medications   Medication Sig Dispense Refill    magnesium (MAGNESIUM-OXIDE) 250 MG TABS tablet Take 250 mg by mouth daily      Cyanocobalamin (VITAMIN B12 PO) Take 2,500 mcg by mouth daily      ramipril (ALTACE) 10 MG capsule Take 1 capsule by mouth daily 90 capsule 1    atorvastatin (LIPITOR) 80 MG tablet Take 0.5 tablets by mouth nightly 90 tablet 1    metoprolol succinate (TOPROL XL) 50 MG extended release tablet Take 1 tablet by mouth 2 times daily 180 tablet 2    amLODIPine (NORVASC) 10 MG tablet Take 1 tablet by mouth daily 90 tablet 2    aspirin 325 MG tablet Take 1 tablet by mouth daily 90 tablet 2     No current facility-administered medications for this visit.         Past Medical History:  Past Medical History:   Diagnosis Date    Aortic stenosis, severe 7/6/2018    Aortic valve disorder     Arthritis     CAD (coronary artery disease)     Chronic kidney disease     stage 2    COPD (chronic obstructive pulmonary disease) (HCC)     Folic acid deficiency     GERD (gastroesophageal reflux disease)     Hyperlipidemia     Hypertension     Symptomatic stenosis of left carotid artery without infarction 07/05/2018    Tobacco dependence     Transient cerebral ischemia     Vitamin B 12 deficiency        Past Surgical History:  Past Surgical History:   Procedure Laterality Date    CARDIAC SURGERY      CAROTID ENDARTERECTOMY Left 07/06/2018    Geri Garcia    CORONARY ARTERY BYPASS GRAFT  2008    CT REOPER, CAROTID ENDARTEC>1 MON Left 7/6/2018    LEFT CAROTID ENDARTERECTOMY performed by Abbe Madera MD at [address] History:  Social History     Socioeconomic History    Marital status:      Spouse name: Not on file    Number of children: Not on file    Years of education: Not on file    Highest education level: Not on file   Occupational History    Not on file   Social Needs    Financial resource strain: Not on file    Food insecurity     Worry: Not on file     Inability: Not on file    Transportation needs     Medical: Not on file     Non-medical: Not on file   Tobacco Use    Smoking status: Current Every Day Smoker     Packs/day: 1.00     Years: 50.00     Pack years: 50.00     Types: Cigarettes     Start date: 1968    Smokeless tobacco: Never Used    Tobacco comment: Does not want to quit.     Substance and Sexual Activity    Alcohol use: No    Drug use: No    Sexual activity: Not on file   Lifestyle    Physical activity     Days per week: Not on file     Minutes per session: Not on file    Stress: Not on file   Relationships    Social connections     Talks on phone: Not on file     Gets together: Not on file     Attends Faith service: Not on file     Active member of club or organization: Not on file     Attends meetings of clubs or organizations: Not on file     Relationship status: Not on file    Intimate partner violence     Fear of current or ex partner: Not on file     Emotionally abused: Not on file     Physically abused: Not on file     Forced sexual activity: Not on file   Other Topics Concern    Not on file   Social History Narrative    Not on file       Family History:  Family History   Problem Relation Age of Onset    High Blood Pressure Mother     Stroke Father     High Blood Pressure Father     Breast Cancer Sister     Other Sister        Review of Systems:  Constitutional: Denies fevers, chills, or weight loss. HEENT: Denies visual changes or hearing loss. Heart: As per HPI. Lungs: Denies shortness of breath, cough, or wheezing. Gastrointestinal: Denies nausea, vomiting, constipation, or diarrhea. Genitourinary: dysuria or hematuria. Psychiatric: Patient denies anxiety or depression. Neurologic: Patient denies weakness of the extremities, dizziness, or headaches. All other ROS checked and found to be negative. Objective:  General Appearance: Pleasant 69y.o. year old male who appears stated age. Communicates well, no acute distress. HEENT: Head is normocephalic, atraumatic. EOMs intact, PERRL. Trachea midline. Lungs: Normal respiratory rate and normal effort. He is not in respiratory distress. Breath sounds clear to auscultation. No wheezes. Heart: Normal rate. Regular rhythm. S1 normal and S2 normal. Positive for murmur. Chest: Symmetric chest wall expansion. Extremities: Normal range of motion. Neurological: Patient is alert and oriented to person, place and time. Patient has normal reflexes. Skin: Warm and dry. Abdomen: Abdomen is soft and non-distended. Bowel sounds are normal. There is no abdominal tenderness tenderness. There is no guarding. There is no mass. Pulses: Distal pulses are intact. Skin: Warm and dry without lesions. Assessment:   68 yo male s/p previous sternotomy and CABG now with severe symptomatic aortic stenosis     Plan:   Given his advanced age, redo status, and frailty, he is high risk for SAVR and would be best served with TAVR.

## 2021-05-13 NOTE — PROGRESS NOTES
[facility]        Patient name: Bassam Moreno    Reason for consult: AS    Referring Physician: Dr. Charlet Sandifer    Primary Care Physician: Jeff Pedroza MD    Date of service: 5/13/2021    Chief Complaint: fatigue    HPI:   50-year-old  male who is referred as a new patient accompanied by his wife. He has severe back pain and leg pain and cannot walk far distances but he reports limited functional capacity over the past year, \"the worst is felt in his life\". He denies lower extremity edema, orthopnea, PND, chest pain, syncope and presyncope. A focused history review includes:  1. HFpEF -proBNP 1023; April 2021  2. Hypertension  3. Smoking  4. CAD  5. TIA; left CEA  6. COPD  7. CKD 2 with baseline creatinine 1.1  8. CABGx3 2008 (done at [facility])      Allergies: No Known Allergies    Home medications:    Current Outpatient Medications   Medication Sig Dispense Refill    magnesium (MAGNESIUM-OXIDE) 250 MG TABS tablet Take 250 mg by mouth daily      Cyanocobalamin (VITAMIN B12 PO) Take 2,500 mcg by mouth daily      ramipril (ALTACE) 10 MG capsule Take 1 capsule by mouth daily 90 capsule 1    atorvastatin (LIPITOR) 80 MG tablet Take 0.5 tablets by mouth nightly 90 tablet 1    metoprolol succinate (TOPROL XL) 50 MG extended release tablet Take 1 tablet by mouth 2 times daily 180 tablet 2    amLODIPine (NORVASC) 10 MG tablet Take 1 tablet by mouth daily 90 tablet 2    aspirin 325 MG tablet Take 1 tablet by mouth daily 90 tablet 2     No current facility-administered medications for this visit.         Past Medical History:  Past Medical History:   Diagnosis Date    Aortic stenosis, severe 7/6/2018    Aortic valve disorder     Arthritis     CAD (coronary artery disease)     Chronic kidney disease     stage 2    COPD (chronic obstructive pulmonary disease) (HCC)     Folic acid deficiency     GERD (gastroesophageal reflux disease)     Hyperlipidemia     Hypertension     Symptomatic stenosis of left carotid artery without infarction 07/05/2018    Tobacco dependence     Transient cerebral ischemia     Vitamin B 12 deficiency        Past Surgical History:  Past Surgical History:   Procedure Laterality Date    CARDIAC SURGERY      CAROTID ENDARTERECTOMY Left 07/06/2018    Geri Garcia    CORONARY ARTERY BYPASS GRAFT  2008    ID REOPER, CAROTID ENDARTEC>1 MON Left 7/6/2018    LEFT CAROTID ENDARTERECTOMY performed by Kathryn Page MD at [address] [facility] History:  Social History     Socioeconomic History    Marital status:      Spouse name: Not on file    Number of children: Not on file    Years of education: Not on file    Highest education level: Not on file   Occupational History    Not on file   Social Needs    Financial resource strain: Not on file    Food insecurity     Worry: Not on file     Inability: Not on file   [employer] needs     Medical: Not on file     Non-medical: Not on file   Tobacco Use    Smoking status: Current Every Day Smoker     Packs/day: 1.00     Years: 50.00     Pack years: 50.00     Types: Cigarettes     Start date: 1968    Smokeless tobacco: Never Used    Tobacco comment: Does not want to quit.     Substance and Sexual Activity    Alcohol use: No    Drug use: No    Sexual activity: Not on file   Lifestyle    Physical activity     Days per week: Not on file     Minutes per session: Not on file    Stress: Not on file   Relationships    Social connections     Talks on phone: Not on file     Gets together: Not on file     Attends Christian service: Not on file     Active member of club or organization: Not on file     Attends meetings of clubs or organizations: Not on file     Relationship status: Not on file    Intimate partner violence     Fear of current or ex partner: Not on file     Emotionally abused: Not on file     Physically abused: Not on file     Forced sexual activity: Not on file   Other Topics Concern    Not on file   Social History Narrative    Not on file       Family History:  Family History   Problem Relation Age of Onset    High Blood Pressure Mother     Stroke Father     High Blood Pressure Father     Breast Cancer Sister     Other Sister        Review of Systems:  As in HPI    Objective:  Vitals /72 (Site: Left Upper Arm, Position: Sitting)   Pulse 56   Resp 24   Ht 5' 6\" (1.676 m)   Wt 160 lb (72.6 kg)   BMI 25.82 kg/m²   General Appearance: Pleasant 69y.o. year old male who appears stated age. Communicates well, no acute distress. HEENT: Head is normocephalic, atraumatic. EOMs intact, PERRL. Trachea midline. Lungs: Normal respiratory rate and normal effort. He is not in respiratory distress. Breath sounds clear to auscultation. No wheezes. Heart: Normal rate. Regular rhythm. S1 normal and S2 normal.  3/6 mid to late peaking systolic ejection murmur with 1/6 brief early diastolic murmur. Chest: Symmetric chest wall expansion. Extremities: Normal range of motion. Neurological: Patient is alert and oriented to person, place and time. Patient has normal reflexes. Skin: Warm and dry. Abdomen: Abdomen is soft and non-distended. Bowel sounds are normal. There is no abdominal tenderness tenderness. There is no guarding. There is no mass. Pulses: Distal pulses are intact. Skin: Warm and dry without lesions.     Lab Results   Component Value Date     11/16/2020    K 4.5 11/16/2020     11/16/2020    CO2 28 11/16/2020    BUN 22 11/16/2020    CREATININE 1.11 11/16/2020    GLUCOSE 107 11/16/2020    CALCIUM 9.2 11/16/2020    PROT 6.8 07/04/2018    LABALBU 4.2 11/16/2020    BILITOT 0.6 11/16/2020    ALKPHOS 109 11/16/2020    AST 17 11/16/2020    ALT 17 11/16/2020    LABGLOM 66 11/16/2020    GFRAA >60 07/04/2018       Lab Results   Component Value Date    WBC 8.0 11/16/2020    HGB 14.6 11/16/2020    HCT 44.1 11/16/2020

## 2021-05-18 DIAGNOSIS — I35.0 NONRHEUMATIC AORTIC VALVE STENOSIS: ICD-10-CM

## 2021-05-18 DIAGNOSIS — Z01.810 PREOPERATIVE CARDIOVASCULAR EXAMINATION: Primary | ICD-10-CM

## 2021-05-19 DIAGNOSIS — I35.0 NODULAR CALCIFIC AORTIC VALVE STENOSIS: Primary | ICD-10-CM

## 2021-05-19 NOTE — PROGRESS NOTES
Spoke to patient's wife re: TAVR CT scans scheduled 6/1/21. Instructed to enter main lobby and report to registration [phone], CT 0700, then report to CV[facility] for heart cath. NPO after midnight. Tentatively plan TAVR on 6/9/21. She states understanding.

## 2021-05-27 DIAGNOSIS — I35.0 NONRHEUMATIC AORTIC VALVE STENOSIS: ICD-10-CM

## 2021-05-27 DIAGNOSIS — Z01.810 PREOPERATIVE CARDIOVASCULAR EXAMINATION: ICD-10-CM

## 2021-05-27 LAB
ALBUMIN SERPL-MCNC: 3.9 G/DL (ref 3.5–5.2)
ALP BLD-CCNC: 94 U/L (ref 40–129)
ALT SERPL-CCNC: 14 U/L (ref 0–40)
ANION GAP SERPL CALCULATED.3IONS-SCNC: 9 MMOL/L (ref 7–16)
AST SERPL-CCNC: 17 U/L (ref 0–39)
BILIRUB SERPL-MCNC: 0.4 MG/DL (ref 0–1.2)
BUN BLDV-MCNC: 22 MG/DL (ref 6–23)
CALCIUM SERPL-MCNC: 9.1 MG/DL (ref 8.6–10.2)
CHLORIDE BLD-SCNC: 105 MMOL/L (ref 98–107)
CO2: 25 MMOL/L (ref 22–29)
CREAT SERPL-MCNC: 1.2 MG/DL (ref 0.7–1.2)
GFR AFRICAN AMERICAN: >60
GFR NON-AFRICAN AMERICAN: 60 ML/MIN/1.73
GLUCOSE BLD-MCNC: 116 MG/DL (ref 74–99)
HCT VFR BLD CALC: 44.5 % (ref 37–54)
HEMOGLOBIN: 14 G/DL (ref 12.5–16.5)
INR BLD: 1
MCH RBC QN AUTO: 29.4 PG (ref 26–35)
MCHC RBC AUTO-ENTMCNC: 31.5 % (ref 32–34.5)
MCV RBC AUTO: 93.5 FL (ref 80–99.9)
PDW BLD-RTO: 13.1 FL (ref 11.5–15)
PLATELET # BLD: 131 E9/L (ref 130–450)
PMV BLD AUTO: 12 FL (ref 7–12)
POTASSIUM SERPL-SCNC: 5.1 MMOL/L (ref 3.5–5)
PROTHROMBIN TIME: 10.7 SEC (ref 9.3–12.4)
RBC # BLD: 4.76 E12/L (ref 3.8–5.8)
SODIUM BLD-SCNC: 139 MMOL/L (ref 132–146)
TOTAL PROTEIN: 6.8 G/DL (ref 6.4–8.3)
WBC # BLD: 7 E9/L (ref 4.5–11.5)

## 2021-05-28 ENCOUNTER — TELEPHONE (OUTPATIENT)
Dept: CARDIAC CATH/INVASIVE PROCEDURES | Age: 71
End: 2021-05-28

## 2021-05-28 NOTE — TELEPHONE ENCOUNTER
Reminded wife of patient's scheduled procedure on 6/1/21. Instructions given and COVID questionnaire completed.

## 2021-06-01 ENCOUNTER — HOSPITAL ENCOUNTER (OUTPATIENT)
Dept: GENERAL RADIOLOGY | Age: 71
Discharge: HOME | End: 2021-06-03
Payer: MEDICARE

## 2021-06-01 ENCOUNTER — HOSPITAL ENCOUNTER (OUTPATIENT)
Dept: CT IMAGING | Age: 71
Discharge: HOME | End: 2021-06-03
Payer: MEDICARE

## 2021-06-01 ENCOUNTER — HOSPITAL ENCOUNTER (OUTPATIENT)
Dept: CARDIAC CATH/INVASIVE PROCEDURES | Age: 71
Setting detail: OBSERVATION
Discharge: HOME | End: 2021-06-02
Attending: INTERNAL MEDICINE | Admitting: INTERNAL MEDICINE
Payer: MEDICARE

## 2021-06-01 DIAGNOSIS — I35.0 NODULAR CALCIFIC AORTIC VALVE STENOSIS: ICD-10-CM

## 2021-06-01 DIAGNOSIS — I25.10 CAD IN NATIVE ARTERY: ICD-10-CM

## 2021-06-01 DIAGNOSIS — I25.10 CORONARY ARTERY DISEASE INVOLVING NATIVE CORONARY ARTERY OF NATIVE HEART, ANGINA PRESENCE UNSPECIFIED: ICD-10-CM

## 2021-06-01 LAB
ABO/RH: NORMAL
ANTIBODY SCREEN: NORMAL
EKG ATRIAL RATE: 66 BPM
EKG P AXIS: 81 DEGREES
EKG P-R INTERVAL: 128 MS
EKG Q-T INTERVAL: 406 MS
EKG QRS DURATION: 92 MS
EKG QTC CALCULATION (BAZETT): 425 MS
EKG R AXIS: 75 DEGREES
EKG T AXIS: 42 DEGREES
EKG VENTRICULAR RATE: 66 BPM
POC ACT LR: 65 SECONDS

## 2021-06-01 PROCEDURE — 2580000003 HC RX 258: Performed by: INTERNAL MEDICINE

## 2021-06-01 PROCEDURE — 71275 CT ANGIOGRAPHY CHEST: CPT

## 2021-06-01 PROCEDURE — G0378 HOSPITAL OBSERVATION PER HR: HCPCS

## 2021-06-01 PROCEDURE — 6360000002 HC RX W HCPCS

## 2021-06-01 PROCEDURE — 93005 ELECTROCARDIOGRAM TRACING: CPT | Performed by: INTERNAL MEDICINE

## 2021-06-01 PROCEDURE — C1894 INTRO/SHEATH, NON-LASER: HCPCS

## 2021-06-01 PROCEDURE — 74174 CTA ABD&PLVS W/CONTRAST: CPT

## 2021-06-01 PROCEDURE — 6370000000 HC RX 637 (ALT 250 FOR IP)

## 2021-06-01 PROCEDURE — 93454 CORONARY ARTERY ANGIO S&I: CPT

## 2021-06-01 PROCEDURE — C1760 CLOSURE DEV, VASC: HCPCS

## 2021-06-01 PROCEDURE — 75572 CT HRT W/3D IMAGE: CPT

## 2021-06-01 PROCEDURE — 2500000003 HC RX 250 WO HCPCS

## 2021-06-01 PROCEDURE — C1769 GUIDE WIRE: HCPCS

## 2021-06-01 PROCEDURE — 85347 COAGULATION TIME ACTIVATED: CPT

## 2021-06-01 PROCEDURE — 2580000003 HC RX 258: Performed by: RADIOLOGY

## 2021-06-01 PROCEDURE — 36415 COLL VENOUS BLD VENIPUNCTURE: CPT

## 2021-06-01 PROCEDURE — 86901 BLOOD TYPING SEROLOGIC RH(D): CPT

## 2021-06-01 PROCEDURE — 92928 PRQ TCAT PLMT NTRAC ST 1 LES: CPT | Performed by: INTERNAL MEDICINE

## 2021-06-01 PROCEDURE — G0379 DIRECT REFER HOSPITAL OBSERV: HCPCS

## 2021-06-01 PROCEDURE — 6360000004 HC RX CONTRAST MEDICATION: Performed by: RADIOLOGY

## 2021-06-01 PROCEDURE — C1753 CATH, INTRAVAS ULTRASOUND: HCPCS

## 2021-06-01 PROCEDURE — C9600 PERC DRUG-EL COR STENT SING: HCPCS

## 2021-06-01 PROCEDURE — 86900 BLOOD TYPING SEROLOGIC ABO: CPT

## 2021-06-01 PROCEDURE — C1874 STENT, COATED/COV W/DEL SYS: HCPCS

## 2021-06-01 PROCEDURE — 86850 RBC ANTIBODY SCREEN: CPT

## 2021-06-01 PROCEDURE — C1725 CATH, TRANSLUMIN NON-LASER: HCPCS

## 2021-06-01 PROCEDURE — 92978 ENDOLUMINL IVUS OCT C 1ST: CPT | Performed by: INTERNAL MEDICINE

## 2021-06-01 PROCEDURE — C1887 CATHETER, GUIDING: HCPCS

## 2021-06-01 PROCEDURE — 2709999900 HC NON-CHARGEABLE SUPPLY

## 2021-06-01 PROCEDURE — 71045 X-RAY EXAM CHEST 1 VIEW: CPT

## 2021-06-01 PROCEDURE — 93455 CORONARY ART/GRFT ANGIO S&I: CPT | Performed by: INTERNAL MEDICINE

## 2021-06-01 PROCEDURE — 6370000000 HC RX 637 (ALT 250 FOR IP): Performed by: INTERNAL MEDICINE

## 2021-06-01 RX ORDER — ONDANSETRON 2 MG/ML
4 INJECTION INTRAMUSCULAR; INTRAVENOUS EVERY 6 HOURS PRN
Status: DISCONTINUED | OUTPATIENT
Start: 2021-06-01 | End: 2021-06-02 | Stop reason: HOSPADM

## 2021-06-01 RX ORDER — SODIUM CHLORIDE 0.9 % (FLUSH) 0.9 %
5-40 SYRINGE (ML) INJECTION EVERY 12 HOURS SCHEDULED
Status: DISCONTINUED | OUTPATIENT
Start: 2021-06-01 | End: 2021-06-02 | Stop reason: HOSPADM

## 2021-06-01 RX ORDER — ASPIRIN 81 MG/1
81 TABLET, CHEWABLE ORAL DAILY
Status: DISCONTINUED | OUTPATIENT
Start: 2021-06-01 | End: 2021-06-02 | Stop reason: HOSPADM

## 2021-06-01 RX ORDER — LABETALOL HYDROCHLORIDE 5 MG/ML
10 INJECTION, SOLUTION INTRAVENOUS EVERY 30 MIN PRN
Status: DISCONTINUED | OUTPATIENT
Start: 2021-06-01 | End: 2021-06-02 | Stop reason: HOSPADM

## 2021-06-01 RX ORDER — ACETAMINOPHEN 325 MG/1
650 TABLET ORAL EVERY 4 HOURS PRN
Status: DISCONTINUED | OUTPATIENT
Start: 2021-06-01 | End: 2021-06-02 | Stop reason: HOSPADM

## 2021-06-01 RX ORDER — ATORVASTATIN CALCIUM 40 MG/1
40 TABLET, FILM COATED ORAL NIGHTLY
Status: DISCONTINUED | OUTPATIENT
Start: 2021-06-01 | End: 2021-06-02 | Stop reason: HOSPADM

## 2021-06-01 RX ORDER — 0.9 % SODIUM CHLORIDE 0.9 %
500 INTRAVENOUS SOLUTION INTRAVENOUS PRN
Status: DISCONTINUED | OUTPATIENT
Start: 2021-06-01 | End: 2021-06-02 | Stop reason: HOSPADM

## 2021-06-01 RX ORDER — SODIUM CHLORIDE 0.9 % (FLUSH) 0.9 %
5-40 SYRINGE (ML) INJECTION PRN
Status: DISCONTINUED | OUTPATIENT
Start: 2021-06-01 | End: 2021-06-02 | Stop reason: HOSPADM

## 2021-06-01 RX ORDER — RAMIPRIL 5 MG/1
10 CAPSULE ORAL DAILY
Status: DISCONTINUED | OUTPATIENT
Start: 2021-06-02 | End: 2021-06-02 | Stop reason: HOSPADM

## 2021-06-01 RX ORDER — CLOPIDOGREL BISULFATE 75 MG/1
75 TABLET ORAL DAILY
Status: DISCONTINUED | OUTPATIENT
Start: 2021-06-02 | End: 2021-06-02 | Stop reason: HOSPADM

## 2021-06-01 RX ORDER — METOPROLOL SUCCINATE 50 MG/1
50 TABLET, EXTENDED RELEASE ORAL 2 TIMES DAILY
Status: DISCONTINUED | OUTPATIENT
Start: 2021-06-01 | End: 2021-06-02 | Stop reason: HOSPADM

## 2021-06-01 RX ORDER — SODIUM CHLORIDE 0.9 % (FLUSH) 0.9 %
10 SYRINGE (ML) INJECTION ONCE
Status: COMPLETED | OUTPATIENT
Start: 2021-06-01 | End: 2021-06-01

## 2021-06-01 RX ORDER — ATROPINE SULFATE 0.4 MG/ML
0.5 AMPUL (ML) INJECTION
Status: DISPENSED | OUTPATIENT
Start: 2021-06-01 | End: 2021-06-01

## 2021-06-01 RX ORDER — SODIUM CHLORIDE 9 MG/ML
25 INJECTION, SOLUTION INTRAVENOUS PRN
Status: DISCONTINUED | OUTPATIENT
Start: 2021-06-01 | End: 2021-06-02 | Stop reason: HOSPADM

## 2021-06-01 RX ORDER — AMLODIPINE BESYLATE 10 MG/1
10 TABLET ORAL DAILY
Status: DISCONTINUED | OUTPATIENT
Start: 2021-06-02 | End: 2021-06-02 | Stop reason: HOSPADM

## 2021-06-01 RX ADMIN — SODIUM CHLORIDE, PRESERVATIVE FREE 10 ML: 5 INJECTION INTRAVENOUS at 21:26

## 2021-06-01 RX ADMIN — METOPROLOL SUCCINATE 50 MG: 50 TABLET, EXTENDED RELEASE ORAL at 21:26

## 2021-06-01 RX ADMIN — Medication 10 ML: at 06:56

## 2021-06-01 RX ADMIN — IOPAMIDOL 100 ML: 755 INJECTION, SOLUTION INTRAVENOUS at 06:51

## 2021-06-01 RX ADMIN — ATORVASTATIN CALCIUM 40 MG: 40 TABLET, FILM COATED ORAL at 21:25

## 2021-06-01 ASSESSMENT — PAIN SCALES - GENERAL
PAINLEVEL_OUTOF10: 0
PAINLEVEL_OUTOF10: 0

## 2021-06-01 NOTE — PROCEDURES
CARDIAC CATHETERIZATION REPORT    : Sayra Masters MD     Date of procedure:06/01/21     Indication:  1. Pre-AVR    Procedures:  Coronary angiogram, Bypass graft angiogram, Percutaneous transluminal coronary angioplasty and stenting and IVUS     Sedation/analgesia:  Midazolam IV and Fentanyl IV     Time sedation was administered: 0932. I was present in the room when sedation was administered. Procedure end time: 5920  Time spent with face to face monitoring of moderate sedation: 98 minutes    Brief history:  78-year-old  male with history of CAD status post CABG x3 in 2008, CKD 2 with baseline creatinine 1.1-1.2, TIA with history of left CEA, COPD, smoking, HFpEF, hypertension who has severe symptomatic aortic stenosis. Description of procedure: The patient presented to the Cath Lab in a(n) elective fashion. The patient was prepped and draped in a sterile manner. Timeout, airway and ASA assessment were completed. Local anesthesia with 1% lidocaine was administered and sedation/analgesia were administered as above. Access was obtained using the modified Seldinger technique and a micropuncture needle in the left radial artery with 6 Estonian slender sheath. Diagnostic angiography was performed from left radial artery access using 5 Estonian MARGARET, JR4, AL-1. PCI was performed left femoral access using 6 Estonian arrow flex sheath; access obtained micropuncture and ultrasound    Coronary anatomy:  Dominance: Left  1. Left main: Not selectively injected  2. LAD: Fills via a patent in situ LIMA graft. 3. Left circumflex: Fills retrograde via patent SVG to OM 2  4. RCA: This is a large vessel that supplies the RV and a small portion of the basal inferoseptum. It has an ostial 95% heavily calcified stenosis. In the distal portion of its proximal segment there is a 60% eccentric stenosis. 5.  In situ LIMA to LAD: This is widely patent and fills the entirety of the mid and distal LAD without significant disease in the runoff. 6. Aortocoronary SVG to diagonal: This is a large widely patent graft that supplies a large, bifurcating first diagonal and retrograde fills the proximal LAD including 2 large septals. 7. Aortocoronary SVG to OM2: This is a large widely patent vessel. It anastomoses to the inferior division of the superior branch of a large bifurcating OM 2. Retrograde this supplies a large bifurcating OM1. In the proximal OM 2 there is a 50% tubular stenosis. The circumflex fills retrograde all the way to the ostium into the left main. Percutaneous coronary intervention:  Anticoagulation: Unfractionated heparin with ACT>250 seconds  Aspirin administered Yes  P2Y12 inhibitor: clopidogrel 600 mg PO loading dose was administered during the case. IV tirofiban was not administered. 1. Lesion location: Ostial RCA; stenosis 95%; DAVID flow 3. Guiding catheter: JR4 via femoral access. The lesion was crossed with a Ed Blue wire and prepared using a 2.5 NC then 3.5 NC balloon at high pressure guided by IVUS. Stenting was performed using 3.5 x 15 Xience Ashlee CASSIE. Post-dilation was performed with 3.5 NC balloon at high pressure with post PCI IVUS. Residual stenosis 0%; post-PCI DAVID flow 3. Complications: None      Hemodynamics: Ao: 122/60 with a mean of 80      Hemostasis:  Transradial band was applied for patent arterial hemostasis. and Angioseal was used for arterial hemostasis. Complications: None  Estimated blood loss: 40 mL  Contrast used: 175 mL  Air kerma: 1471 mGy    Conclusions:  1. Widely patent LIMA to LAD, SVG to D1, SVG to OM 2-all grafts are in good health and supply large runoff territory with no significant disease  2. 95% ostial RCA stenosis status post successful PCI with single CASSIE with IVUS      PLAN:  1. Overnight observation  2. Aspirin 81 mg daily and clopidogrel 75 mg daily for at least 6 months followed by single antiplatelet therapy indefinitely  3. High intensity statin  4. Beta-blocker  5. Cardiac rehab after TAVR  6. Okay for discharge tomorrow if no access related complications and asymptomatic with ambulation  7.  Proceed to TAVR via right femoral cutdown with left radial pigtail-anesthesia plan to be determined based on CT anatomy    Chuck Green MD, [facility] [place]  Interventional Cardiology/Structural Heart Disease

## 2021-06-01 NOTE — H&P
REOPER, CAROTID ENDARTEC>1 MON Left 07/06/2018    LEFT CAROTID ENDARTERECTOMY performed by Benjamin Mcclellan MD at [facility] OR       Medications Prior to Admission:      Prior to Admission medications    Medication Sig Start Date End Date Taking? Authorizing Provider   magnesium (MAGNESIUM-OXIDE) 250 MG TABS tablet Take 250 mg by mouth daily   Yes Historical Provider, MD   Cyanocobalamin (VITAMIN B12 PO) Take 2,500 mcg by mouth daily   Yes Historical Provider, MD   ramipril (ALTACE) 10 MG capsule Take 1 capsule by mouth daily 3/25/21 9/21/21 Yes Eric Vivas MD   atorvastatin (LIPITOR) 80 MG tablet Take 0.5 tablets by mouth nightly 11/23/20 11/18/21 Yes Eric Vivas MD   metoprolol succinate (TOPROL XL) 50 MG extended release tablet Take 1 tablet by mouth 2 times daily 11/23/20 8/20/21 Yes Eric Vivas MD   amLODIPine (NORVASC) 10 MG tablet Take 1 tablet by mouth daily 11/23/20 8/20/21 Yes Eric Vivas MD   aspirin 325 MG tablet Take 1 tablet by mouth daily 7/22/20  Yes Eric Vivas MD       Allergies:  Patient has no known allergies. Social History:    RESIDENCE: private residence  TOBACCO:   reports that he has been smoking cigarettes. He started smoking about 53 years ago. He has a 50.00 pack-year smoking history. He has never used smokeless tobacco.  ETOH:   reports no history of alcohol use. Family History:    As follows:      Problem Relation Age of Onset    High Blood Pressure Mother     Stroke Father     High Blood Pressure Father     Breast Cancer Sister     Other Sister        REVIEW OF SYSTEMS:   Pertinent positives as noted in the HPI. All other systems reviewed and negative. PHYSICAL EXAM:  BP (!) 142/68   Pulse 68   Temp 98.4 °F (36.9 °C)   Ht 5' 6\" (1.676 m)   Wt 160 lb (72.6 kg)   SpO2 98%   BMI 25.82 kg/m²   General appearance: No apparent distress, appears stated age and cooperative. HEENT: Normal cephalic, atraumatic without obvious deformity.  Pupils equal, round, and reactive to light. Extra ocular muscles intact. Conjunctivae/corneas clear. Neck: Supple, with full range of motion. No jugular venous distention. Trachea midline. Respiratory:  Clear to auscultation bilaterally. No apparent distress. Cardiovascular:  Regular rate and rhythm. S1, S2, loud pansystolic murmur. PV: Brisk capillary refill. +2 pedal and radial pulses bilaterally. No clubbing, cyanosis, edema of bilateral lower extremities. Abdomen: Soft, non-tender, distended. +BS  Musculoskeletal: No obvious deformities or erythematous or edematous joints. Skin: Normal skin color. No rashes or lesions. Neurologic:  Neurovascularly intact without any focal sensory/motor deficits. Psychiatric: Alert and oriented, thought content appropriate, normal insight    Reviewed EKG and CXR personally      CBC:   No results for input(s): WBC, RBC, HGB, HCT, MCV, RDW, PLT in the last 72 hours. BMP: No results for input(s): NA, K, CL, CO2, BUN, CREATININE, MG, PHOS in the last 72 hours. Invalid input(s): CA  LFT:  No results for input(s): PROT, ALB, ALKPHOS, ALT, AST, BILITOT, AMYLASE, LIPASE in the last 72 hours. CE:  No results for input(s): Gordan Blacksmith in the last 72 hours. PT/INR: No results for input(s): INR, APTT in the last 72 hours. BNP: No results for input(s): BNP in the last 72 hours.   ESR: No results found for: SEDRATE  CRP: No results found for: CRP  D Dimer: No results found for: DDIMER   Folate and B12:   Lab Results   Component Value Date    ELRBNYRW86 6.2 01/09/2020   ,   Lab Results   Component Value Date    FOLATE 6.1 07/03/2018     Lactic Acid: No results found for: LACTA  Thyroid Studies:   Lab Results   Component Value Date    TSH 2.63 11/16/2020       Oupatient labs:  Lab Results   Component Value Date    CHOL 135 11/16/2020    TRIG 56 11/16/2020    HDL 58 11/16/2020    LDLCALC 63 11/16/2020    TSH 2.63 11/16/2020    INR 1.0 05/27/2021    LABA1C 5.4 07/03/2018 Urinalysis:  No results found for: NITRU, WBCUA, BACTERIA, RBCUA, BLOODU, SPECGRAV, GLUCOSEU    Imaging:  XR CHEST PORTABLE    Result Date: 6/1/2021  No acute process     CTA CHEST W CONTRAST    Result Date: 6/1/2021  1. Combine report for CT chest, CT a abdomen, and CT transcatheter aortic valve replacement. 2.  Nodular thickening of the aortic valve cusps with areas of calcification. Very limited opening of the aortic valve in systole. High aortic valve calcium score. 3.  Status post CABG. 4.  Possible a mediastinal vascular impression in the anterior wall of the right atrium to the right of the midline, can be correlated with the echocardiogram. 5.  Pattern of mild concentric hypertrophy of the left ventricle. 6.  Calcified atheromatous changes predominant in the mid distal thirds of the abdominal aorta without aneurysm formation or significant encroachment of the aortic lumen. 7.  Emphysematous changes in the lung parenchyma in moderate degree particularly to the upper lobes. Tiny right upper lobe pulmonary nodule 3 mm can be followed in a year time interval.  No acute pulmonary process. CTA ABDOMEN PELVIS W CONTRAST    Result Date: 6/1/2021  1. Combine report for CT chest, CT a abdomen, and CT transcatheter aortic valve replacement. 2.  Nodular thickening of the aortic valve cusps with areas of calcification. Very limited opening of the aortic valve in systole. High aortic valve calcium score. 3.  Status post CABG. 4.  Possible a mediastinal vascular impression in the anterior wall of the right atrium to the right of the midline, can be correlated with the echocardiogram. 5.  Pattern of mild concentric hypertrophy of the left ventricle. 6.  Calcified atheromatous changes predominant in the mid distal thirds of the abdominal aorta without aneurysm formation or significant encroachment of the aortic lumen.  7.  Emphysematous changes in the lung parenchyma in moderate degree particularly to the upper lobes.  Tiny right upper lobe pulmonary nodule 3 mm can be followed in a year time interval.  No acute pulmonary process. CTA TRANSCATHETER AORTIC VALVE REPLACEMENT    Result Date: 6/1/2021  1. Combine report for CT chest, CT a abdomen, and CT transcatheter aortic valve replacement. 2.  Nodular thickening of the aortic valve cusps with areas of calcification. Very limited opening of the aortic valve in systole. High aortic valve calcium score. 3.  Status post CABG. 4.  Possible a mediastinal vascular impression in the anterior wall of the right atrium to the right of the midline, can be correlated with the echocardiogram. 5.  Pattern of mild concentric hypertrophy of the left ventricle. 6.  Calcified atheromatous changes predominant in the mid distal thirds of the abdominal aorta without aneurysm formation or significant encroachment of the aortic lumen. 7.  Emphysematous changes in the lung parenchyma in moderate degree particularly to the upper lobes. Tiny right upper lobe pulmonary nodule 3 mm can be followed in a year time interval.  No acute pulmonary process. ASSESSMENT/PLAN:    CAD- s/p CABG x 3 (2008), cardiac cath 6/1/21 revealed patent grafts in good health (LIMA to LAD, SVG to D1, SVG to OM2) and 95% ostial RCA stenosis. PCI was performed with single CASSIE.     Severe AS- plan for TAVR in the near future    HTN- continue amlodipine, ACEI and metoprolol    TIA- s/p left CEA    COPD- not in acute exacerbation      Diet: No diet orders on file  Code Status: Prior  Surrogate decision maker confirmed with patient:   Extended Emergency Contact Information  Primary Emergency Contact: Tanya Beach [address] Phone: [phone]  Relation: Spouse      Disposition: []Med/Surg [x] Intermediate [] ICU/CCU  Admit status: [x] Observation [] Inpatient     +++++++++++++++++++++++++++++++++++++++++++++++++  [address] [id number] [address]  +++++++++++++++++++++++++++++++++++++++++++++++++  NOTE: This report was transcribed using voice recognition software. Every effort was made to ensure accuracy; however, inadvertent computerized transcription errors may be present.

## 2021-06-02 ENCOUNTER — APPOINTMENT (OUTPATIENT)
Dept: ULTRASOUND IMAGING | Age: 71
End: 2021-06-02
Attending: INTERNAL MEDICINE
Payer: MEDICARE

## 2021-06-02 VITALS
RESPIRATION RATE: 16 BRPM | SYSTOLIC BLOOD PRESSURE: 132 MMHG | BODY MASS INDEX: 26.23 KG/M2 | WEIGHT: 163.2 LBS | TEMPERATURE: 97.6 F | HEIGHT: 66 IN | OXYGEN SATURATION: 97 % | HEART RATE: 65 BPM | DIASTOLIC BLOOD PRESSURE: 88 MMHG

## 2021-06-02 LAB
ANION GAP SERPL CALCULATED.3IONS-SCNC: 9 MMOL/L (ref 7–16)
BUN BLDV-MCNC: 22 MG/DL (ref 6–23)
CALCIUM SERPL-MCNC: 9.2 MG/DL (ref 8.6–10.2)
CHLORIDE BLD-SCNC: 102 MMOL/L (ref 98–107)
CO2: 24 MMOL/L (ref 22–29)
CREAT SERPL-MCNC: 1.2 MG/DL (ref 0.7–1.2)
GFR AFRICAN AMERICAN: >60
GFR NON-AFRICAN AMERICAN: 60 ML/MIN/1.73
GLUCOSE BLD-MCNC: 87 MG/DL (ref 74–99)
HCT VFR BLD CALC: 41.5 % (ref 37–54)
HEMOGLOBIN: 13.2 G/DL (ref 12.5–16.5)
MCH RBC QN AUTO: 28.9 PG (ref 26–35)
MCHC RBC AUTO-ENTMCNC: 31.8 % (ref 32–34.5)
MCV RBC AUTO: 91 FL (ref 80–99.9)
PDW BLD-RTO: 13.2 FL (ref 11.5–15)
PLATELET # BLD: 120 E9/L (ref 130–450)
PMV BLD AUTO: 11.9 FL (ref 7–12)
POTASSIUM REFLEX MAGNESIUM: 4.6 MMOL/L (ref 3.5–5)
RBC # BLD: 4.56 E12/L (ref 3.8–5.8)
SODIUM BLD-SCNC: 135 MMOL/L (ref 132–146)
WBC # BLD: 8.7 E9/L (ref 4.5–11.5)

## 2021-06-02 PROCEDURE — 94729 DIFFUSING CAPACITY: CPT

## 2021-06-02 PROCEDURE — 94010 BREATHING CAPACITY TEST: CPT | Performed by: INTERNAL MEDICINE

## 2021-06-02 PROCEDURE — 80048 BASIC METABOLIC PNL TOTAL CA: CPT

## 2021-06-02 PROCEDURE — 93880 EXTRACRANIAL BILAT STUDY: CPT

## 2021-06-02 PROCEDURE — 93880 EXTRACRANIAL BILAT STUDY: CPT | Performed by: RADIOLOGY

## 2021-06-02 PROCEDURE — 36415 COLL VENOUS BLD VENIPUNCTURE: CPT

## 2021-06-02 PROCEDURE — 85027 COMPLETE CBC AUTOMATED: CPT

## 2021-06-02 PROCEDURE — 94375 RESPIRATORY FLOW VOLUME LOOP: CPT

## 2021-06-02 PROCEDURE — G0378 HOSPITAL OBSERVATION PER HR: HCPCS

## 2021-06-02 PROCEDURE — 94729 DIFFUSING CAPACITY: CPT | Performed by: INTERNAL MEDICINE

## 2021-06-02 PROCEDURE — 6370000000 HC RX 637 (ALT 250 FOR IP): Performed by: INTERNAL MEDICINE

## 2021-06-02 PROCEDURE — 94726 PLETHYSMOGRAPHY LUNG VOLUMES: CPT | Performed by: INTERNAL MEDICINE

## 2021-06-02 PROCEDURE — 2580000003 HC RX 258: Performed by: INTERNAL MEDICINE

## 2021-06-02 RX ORDER — CLOPIDOGREL BISULFATE 75 MG/1
75 TABLET ORAL DAILY
Qty: 30 TABLET | Refills: 3 | Status: SHIPPED | OUTPATIENT
Start: 2021-06-03 | End: 2021-09-22 | Stop reason: SDUPTHER

## 2021-06-02 RX ORDER — ATORVASTATIN CALCIUM 80 MG/1
80 TABLET, FILM COATED ORAL NIGHTLY
Qty: 90 TABLET | Refills: 0 | Status: SHIPPED | OUTPATIENT
Start: 2021-06-02 | End: 2021-07-15 | Stop reason: SDUPTHER

## 2021-06-02 RX ADMIN — ASPIRIN 81 MG CHEWABLE TABLET 81 MG: 81 TABLET CHEWABLE at 10:19

## 2021-06-02 RX ADMIN — CLOPIDOGREL 75 MG: 75 TABLET, FILM COATED ORAL at 10:19

## 2021-06-02 RX ADMIN — SODIUM CHLORIDE, PRESERVATIVE FREE 10 ML: 5 INJECTION INTRAVENOUS at 10:20

## 2021-06-02 RX ADMIN — METOPROLOL SUCCINATE 50 MG: 50 TABLET, EXTENDED RELEASE ORAL at 10:19

## 2021-06-02 RX ADMIN — RAMIPRIL 10 MG: 5 CAPSULE ORAL at 10:19

## 2021-06-02 RX ADMIN — AMLODIPINE BESYLATE 10 MG: 10 TABLET ORAL at 10:19

## 2021-06-02 ASSESSMENT — PAIN SCALES - GENERAL: PAINLEVEL_OUTOF10: 0

## 2021-06-02 NOTE — DISCHARGE SUMMARY
Hospital Medicine Discharge Summary    Patient ID: Rehana Blum      Patient's PCP: Catarino Cash MD    Admit Date: 6/1/2021     Discharge Date:    6/2/2021    Admitting Physician: Stanislaw Sandoval MD     Discharge Physician: Drew Edwards MD      Condition at discharge: Stable    Disposition: Home    Discharge Diagnoses: Active Hospital Problems    Diagnosis Date Noted    CAD in native artery [I25.10] 06/01/2021       The patient was seen and examined on day of discharge and this discharge summary is in conjunction with any daily progress note from day of discharge. Hospital Course:   Mr. Rehana Blum, a 69y.o. year old male  who  has a past medical history of Aortic stenosis, severe, Aortic valve disorder, Arthritis, CAD (coronary artery disease), Chronic kidney disease, COPD (chronic obstructive pulmonary disease) ([address] Folic acid deficiency, GERD (gastroesophageal reflux disease), Hyperlipidemia, Hypertension, Symptomatic stenosis of left carotid artery without infarction, Tobacco dependence, Transient cerebral ischemia, and Vitamin B 12 deficiency. He has a history of CABG x 3 (2008) and severe symptomatic aortic stenosis. He underwent elective cardiac cath in preparation for AVR and was admitted post procedure. Cath revealed 95% stenosis to the ostial RCA. A single CASSIE was placed. The LIMA to LAD, SVG to D1 and SVG to OM 2 were all in good health and patent. Plan for TAVR in the near future. He was recommended to be on dual antiplatelet therapy for at least 6 months. Overnight patient remained stable, denies any headache, dizziness, chest pain. He will need close follow-up with cardiology as outpatient. Patient is to be scheduled for TAVR as outpatient. He will be placed on high intensity statin.     Consults:     IP CONSULT TO CARDIAC REHAB    Significant Diagnostic Studies:  As above      Discharge Instructions/Follow-up:  As above       Activity: activity as tolerated    Physical Exam:  Vitals:    06/02/21 1152   BP: 132/88   Pulse: 65   Resp: 16   Temp: 97.6 °F (36.4 °C)   SpO2: 97%       General appearance: No apparent distress, appears stated age and cooperative. Respiratory:  Clear to auscultation, good air entry. Cardiovascular: RRR, no murmur. Abdomen: Soft, non-tender, non-distended with normal bowel sounds. Neurologic: awake, alert and following commands     Labs:  For convenience and continuity at follow-up the following most recent labs are provided:      CBC:    Lab Results   Component Value Date    WBC 8.7 06/02/2021    HGB 13.2 06/02/2021    HCT 41.5 06/02/2021     06/02/2021       Renal:    Lab Results   Component Value Date     06/02/2021    K 4.6 06/02/2021     06/02/2021    CO2 24 06/02/2021    BUN 22 06/02/2021    CREATININE 1.2 06/02/2021    CALCIUM 9.2 06/02/2021    PHOS 3.9 07/03/2018         Discharge Medications:     Current Discharge Medication List           Details   clopidogrel (PLAVIX) 75 MG tablet Take 1 tablet by mouth daily  Qty: 30 tablet, Refills: 3              Details   atorvastatin (LIPITOR) 80 MG tablet Take 1 tablet by mouth nightly  Qty: 90 tablet, Refills: 0    Associated Diagnoses: Coronary artery disease involving native coronary artery of native heart, angina presence unspecified              Details   magnesium (MAGNESIUM-OXIDE) 250 MG TABS tablet Take 250 mg by mouth daily      Cyanocobalamin (VITAMIN B12 PO) Take 2,500 mcg by mouth daily      ramipril (ALTACE) 10 MG capsule Take 1 capsule by mouth daily  Qty: 90 capsule, Refills: 1    Associated Diagnoses: Coronary artery disease involving native coronary artery of native heart, angina presence unspecified; Essential hypertension      metoprolol succinate (TOPROL XL) 50 MG extended release tablet Take 1 tablet by mouth 2 times daily  Qty: 180 tablet, Refills: 2    Associated Diagnoses: Coronary artery disease involving native coronary artery of native heart, angina presence unspecified; Essential hypertension      amLODIPine (NORVASC) 10 MG tablet Take 1 tablet by mouth daily  Qty: 90 tablet, Refills: 2    Associated Diagnoses: Coronary artery disease involving native coronary artery of native heart, angina presence unspecified; Essential hypertension      aspirin 325 MG tablet Take 1 tablet by mouth daily  Qty: 90 tablet, Refills: 2               Signed:    Charity Cifuentes MD   6/2/2021      Thank you Kamran Art MD for the opportunity to be involved in this patient's care. If you have any questions or concerns please feel free to contact me. NOTE: This report was transcribed using voice recognition software. Every effort was made to ensure accuracy; however, inadvertent computerized transcription errors may be present.

## 2021-06-02 NOTE — PROGRESS NOTES
CLINICAL PHARMACY NOTE: MEDS TO BEDS    Total # of Prescriptions Filled: 1   The following medications were delivered to the patient:  · clopidogrel 75mg    Additional Documentation:    Delivered to patient 6/2 @4:10pm
ALKPHOS in the last 72 hours. No results for input(s): INR in the last 72 hours. No results for input(s): Earnie Lent in the last 72 hours. Medications:  Reviewed    Infusion Medications    sodium chloride       Scheduled Medications    amLODIPine  10 mg Oral Daily    aspirin  81 mg Oral Daily    atorvastatin  40 mg Oral Nightly    metoprolol succinate  50 mg Oral BID    ramipril  10 mg Oral Daily    sodium chloride flush  5-40 mL Intravenous 2 times per day    clopidogrel  75 mg Oral Daily     PRN Meds: sodium chloride flush, sodium chloride, acetaminophen, sodium chloride, ondansetron, labetalol      Intake/Output Summary (Last 24 hours) at 6/2/2021 0831  Last data filed at 6/2/2021 0600  Gross per 24 hour   Intake 730 ml   Output 950 ml   Net -220 ml     · Body mass index is 26.34 kg/m². · Diet  · DIET LOW SODIUM 2 GM;    · Code Status  · Full Code      · PT/OT Eval Status  · ***     · Disposition  · ***     · DVT Prophylaxis  · ***       Electronically signed by Barbara Dubose MD on 6/2/2021 at 8:31 AM  [employer]   Please contact me through perfect serve    NOTE: This report was transcribed using voice recognition software. Every effort was made to ensure accuracy; however, inadvertent computerized transcription errors may be present.

## 2021-06-02 NOTE — CONSULTS
Met with patient and discussed that their physician has ordered a referral to our outpatient Phase II Cardiac Rehabilitation program. Reviewed the benefits of cardiac rehabilitation based on their diagnosis and personal risk factors. Patient demonstrates mild interest in Cardiac Rehabilitation at this time. Cardiac Rehabilitation brochure provided to patient/family. The Cardiac Rehabilitation Program has been provided the patient's referral information and pertinent patient details and history. The patient may call [facility] at [phone] for additional information or questions. Contact information for [facility] and other choices close to the patient's residence have been provided in the discharge instructions so that the patient may call and schedule an appointment when cleared by their physician.  Thank you for the referral.

## 2021-06-02 NOTE — CARE COORDINATION
Met with pt in the room. Role of  and transition of care discussed. Pt lives with his wife in a ranch style home. Pt is independent, drives, uses no DME, no hx of hhc/[facility]. Pt plans to return home at discharge and denies any needs. PCP Dr Alok Stoddard. Pharmacy [facility] in [place].

## 2021-06-03 NOTE — PROCEDURES
[address]                  [address] [facility],  [facility]                               PULMONARY FUNCTION    PATIENT NAME: Bentley Burdick                      :        1950  MED REC NO:   [id number]                            ROOM:  ACCOUNT NO:   [de-identified]                           ADMIT DATE: 2021  PROVIDER:     Jean Wade MD    DATE OF PROCEDURE:  2021    AGE:  69.    WEIGHT:  163. HEIGHT:  66    YEAR OF SMOKIN    SPIROMETRY:  FVC 1.84 which is 50% of predicted, FEV1 0.99 which is 35  of predicted. FEV1/FVC 54 which is 70% of predicted. MVV is 33 which  is 29 of predicted. Moving to lung volumes, slow vital capacity 2.13  which is 58. ERV 0.71 which is 106. DIFFUSION CAPACITY:  8.96 which is 33, corrects for alveolar volume to  75. IMPRESSION:  This PFT is showing evidence of very severe obstructive  lung disease along with severe reduction in diffusion capacity. Most  likely diagnoses emphysema and COPD. Clinical and radiological  indicated, classified as very severe COPD according to ATS  classification. Clinical and radiological correlation indicated giving  the patient also the need for surgery to consider discussing the risks  and benefits given this very severe COPD finding.         Andriy Kim MD    D: 2021 12:44:21       T: 2021 12:53:57     [email or web address]  Job#: [id number]     Doc#: [id number]    CC:

## 2021-06-24 ENCOUNTER — TELEPHONE (OUTPATIENT)
Dept: CARDIOLOGY CLINIC | Age: 71
End: 2021-06-24

## 2021-06-24 NOTE — TELEPHONE ENCOUNTER
Wife called PT has to have several teeth extracted dentist feels with heart condition he would be at risk. According to the wife wants him to go to [facility].     Wife is requesting to speak to you

## 2021-06-29 ENCOUNTER — TELEPHONE (OUTPATIENT)
Dept: CARDIOLOGY | Age: 71
End: 2021-06-29

## 2021-06-29 NOTE — TELEPHONE ENCOUNTER
Left vm to notify patient of dental clinic appt scheduled 7/7/21 at 0800. Advised to bring all prior dental records to appt. Phone number for dental clinic provided and advised to me back if any questions.

## 2021-07-09 ENCOUNTER — TELEPHONE (OUTPATIENT)
Dept: CARDIOLOGY | Age: 71
End: 2021-07-09

## 2021-07-09 NOTE — TELEPHONE ENCOUNTER
Spoke with dental clinic who states patient will require extraction of 13 teeth, and oral surgeon would prefer him to hold plavix for a few days prior. Explained to dental clinic that patient underwent coronary stent placement 6/1/21, and would be preferable to do extraction without holding antiplatelet therapy. Stated they will relay this info to oral surgeon. In the meantime, patient's wife called stating patient does not feel comfortable proceeding with tooth extraction while on plavix due to fear of \"choking on his blood and dying\". Explained to her that risk is greater of acute stent thrombosis from holding antiplatelet this soon after PCI. She states understanding, but states it is her 's choice. Spoke to Dr. Malina Richardson who would prefer not to hold plavix for dental extraction for at least 3 months post PCI (9/1/21). Patient's wife states he is willing to wait. She states understanding of possible progression of symptoms/worsening heart function with delay of TAVR procedure. She will call if things worsen in the meantime.

## 2021-07-15 DIAGNOSIS — I25.10 CORONARY ARTERY DISEASE INVOLVING NATIVE CORONARY ARTERY OF NATIVE HEART: ICD-10-CM

## 2021-07-15 RX ORDER — ATORVASTATIN CALCIUM 80 MG/1
80 TABLET, FILM COATED ORAL NIGHTLY
Qty: 90 TABLET | Refills: 1 | Status: SHIPPED
Start: 2021-07-15 | End: 2021-12-01 | Stop reason: SDUPTHER

## 2021-07-23 LAB
ALBUMIN SERPL-MCNC: 4.2 G/DL
ALP BLD-CCNC: 92 U/L
ALT SERPL-CCNC: 14 U/L
ANION GAP SERPL CALCULATED.3IONS-SCNC: NORMAL MMOL/L
AST SERPL-CCNC: 16 U/L
BASOPHILS ABSOLUTE: 49 /ΜL
BASOPHILS RELATIVE PERCENT: 0.7 %
BILIRUB SERPL-MCNC: 0.6 MG/DL (ref 0.1–1.4)
BUN BLDV-MCNC: 24 MG/DL
CALCIUM SERPL-MCNC: 9.6 MG/DL
CHLORIDE BLD-SCNC: 106 MMOL/L
CHOLESTEROL, TOTAL: 126 MG/DL
CHOLESTEROL/HDL RATIO: 2.6
CO2: 28 MMOL/L
CREAT SERPL-MCNC: 1.39 MG/DL
EOSINOPHILS ABSOLUTE: 112 /ΜL
EOSINOPHILS RELATIVE PERCENT: 1.6 %
GFR CALCULATED: 50
GLUCOSE BLD-MCNC: 105 MG/DL
HCT VFR BLD CALC: 39.5 % (ref 41–53)
HDLC SERPL-MCNC: 49 MG/DL (ref 35–70)
HEMOGLOBIN: 13.4 G/DL (ref 13.5–17.5)
LDL CHOLESTEROL CALCULATED: 60 MG/DL (ref 0–160)
LYMPHOCYTES ABSOLUTE: 1267 /ΜL
LYMPHOCYTES RELATIVE PERCENT: 18.1 %
MCH RBC QN AUTO: 30.2 PG
MCHC RBC AUTO-ENTMCNC: 33.9 G/DL
MCV RBC AUTO: 89.2 FL
MONOCYTES ABSOLUTE: 616 /ΜL
MONOCYTES RELATIVE PERCENT: 8.8 %
NEUTROPHILS ABSOLUTE: 4956 /ΜL
NEUTROPHILS RELATIVE PERCENT: 70.8 %
NONHDLC SERPL-MCNC: 77 MG/DL
PDW BLD-RTO: 12.8 %
PLATELET # BLD: 139 K/ΜL
PMV BLD AUTO: 11.2 FL
POTASSIUM SERPL-SCNC: 5.4 MMOL/L
RBC # BLD: 4.43 10^6/ΜL
SODIUM BLD-SCNC: 139 MMOL/L
TOTAL PROTEIN: 6.8
TRIGL SERPL-MCNC: 87 MG/DL
VITAMIN D 25-HYDROXY: 11
VITAMIN D2, 25 HYDROXY: NORMAL
VITAMIN D3,25 HYDROXY: NORMAL
VLDLC SERPL CALC-MCNC: NORMAL MG/DL
WBC # BLD: 7 10^3/ML

## 2021-07-26 DIAGNOSIS — E78.2 MIXED HYPERLIPIDEMIA: ICD-10-CM

## 2021-07-26 DIAGNOSIS — E55.9 VITAMIN D DEFICIENCY: ICD-10-CM

## 2021-07-26 DIAGNOSIS — I10 ESSENTIAL HYPERTENSION: ICD-10-CM

## 2021-07-28 ENCOUNTER — OFFICE VISIT (OUTPATIENT)
Dept: PRIMARY CARE CLINIC | Age: 71
End: 2021-07-28
Payer: MEDICARE

## 2021-07-28 VITALS
WEIGHT: 154 LBS | DIASTOLIC BLOOD PRESSURE: 60 MMHG | SYSTOLIC BLOOD PRESSURE: 92 MMHG | OXYGEN SATURATION: 99 % | RESPIRATION RATE: 18 BRPM | HEART RATE: 60 BPM | TEMPERATURE: 97.2 F | HEIGHT: 66 IN | BODY MASS INDEX: 24.75 KG/M2

## 2021-07-28 DIAGNOSIS — N18.2 STAGE 2 CHRONIC KIDNEY DISEASE: ICD-10-CM

## 2021-07-28 DIAGNOSIS — I35.0 AORTIC VALVE STENOSIS, ETIOLOGY OF CARDIAC VALVE DISEASE UNSPECIFIED: ICD-10-CM

## 2021-07-28 DIAGNOSIS — E78.2 MIXED HYPERLIPIDEMIA: Primary | ICD-10-CM

## 2021-07-28 DIAGNOSIS — E55.9 VITAMIN D DEFICIENCY: ICD-10-CM

## 2021-07-28 DIAGNOSIS — I10 ESSENTIAL HYPERTENSION: ICD-10-CM

## 2021-07-28 DIAGNOSIS — G45.1 HEMISPHERIC CAROTID ARTERY SYNDROME: ICD-10-CM

## 2021-07-28 DIAGNOSIS — I25.10 CORONARY ARTERY DISEASE INVOLVING NATIVE CORONARY ARTERY OF NATIVE HEART WITHOUT ANGINA PECTORIS: ICD-10-CM

## 2021-07-28 DIAGNOSIS — F17.200 TOBACCO DEPENDENCE: ICD-10-CM

## 2021-07-28 DIAGNOSIS — J43.1 PANLOBULAR EMPHYSEMA (HCC): ICD-10-CM

## 2021-07-28 DIAGNOSIS — K21.9 GASTROESOPHAGEAL REFLUX DISEASE, UNSPECIFIED WHETHER ESOPHAGITIS PRESENT: ICD-10-CM

## 2021-07-28 PROCEDURE — 99215 OFFICE O/P EST HI 40 MIN: CPT | Performed by: FAMILY MEDICINE

## 2021-07-28 RX ORDER — ERGOCALCIFEROL 1.25 MG/1
50000 CAPSULE ORAL WEEKLY
Qty: 12 CAPSULE | Refills: 1 | Status: SHIPPED
Start: 2021-07-28 | End: 2021-12-01 | Stop reason: SDUPTHER

## 2021-07-28 RX ORDER — METOPROLOL SUCCINATE 50 MG/1
50 TABLET, EXTENDED RELEASE ORAL 2 TIMES DAILY
Qty: 180 TABLET | Refills: 2 | Status: SHIPPED
Start: 2021-07-28 | End: 2022-04-06 | Stop reason: SDUPTHER

## 2021-07-28 RX ORDER — RAMIPRIL 10 MG/1
10 CAPSULE ORAL DAILY
Qty: 90 CAPSULE | Refills: 1 | Status: SHIPPED
Start: 2021-07-28 | End: 2021-09-29

## 2021-07-28 RX ORDER — AMLODIPINE BESYLATE 5 MG/1
5 TABLET ORAL DAILY
Qty: 90 TABLET | Refills: 2 | Status: SHIPPED
Start: 2021-07-28 | End: 2022-04-06 | Stop reason: SDUPTHER

## 2021-07-28 SDOH — ECONOMIC STABILITY: FOOD INSECURITY: WITHIN THE PAST 12 MONTHS, THE FOOD YOU BOUGHT JUST DIDN'T LAST AND YOU DIDN'T HAVE MONEY TO GET MORE.: NEVER TRUE

## 2021-07-28 SDOH — ECONOMIC STABILITY: FOOD INSECURITY: WITHIN THE PAST 12 MONTHS, YOU WORRIED THAT YOUR FOOD WOULD RUN OUT BEFORE YOU GOT MONEY TO BUY MORE.: NEVER TRUE

## 2021-07-28 ASSESSMENT — ENCOUNTER SYMPTOMS
NAUSEA: 0
CONSTIPATION: 0
SHORTNESS OF BREATH: 0
ABDOMINAL PAIN: 0
WHEEZING: 0
DIARRHEA: 0
RHINORRHEA: 0
SORE THROAT: 0
VOMITING: 0

## 2021-07-28 ASSESSMENT — SOCIAL DETERMINANTS OF HEALTH (SDOH): HOW HARD IS IT FOR YOU TO PAY FOR THE VERY BASICS LIKE FOOD, HOUSING, MEDICAL CARE, AND HEATING?: NOT HARD AT ALL

## 2021-07-28 NOTE — PROGRESS NOTES
2021     Chief Complaint   Patient presents with    Hypertension     HPI  Rosmery Mcmullen (:  1950) is a 66 y.o. male, here for evaluation of the following medical concerns:    Pt is a 19-year-old male with a past medical history of hypertension, hyperlipidemia, history of coronary artery disease, carotid artery disease and left sided CEA, COPD, current smoker, CKD stage II, nonrheumatic aortic valve stenosis. Patient presents today for a routine follow-up and also in regards to a recent hospital discharge in . Patient underwent elective cardiac catheterization in preparation of AVR and was admitted post procedure. Patient had a 95% stenosis of his ostial RCA. A single drug-eluting stent was placed. Plans for TAVR in the near future. Patient is recommended to be on dual antiplatelet therapy for at least 6 months after the placement of CASSIE.    HTN: Stable, below 150/90 which is his goal. Patient is currently on amlodipine 10 mg daily, ramipril 10 mg daily, Metoprolol ER 50 mg twice daily.     HLD: Atorvastatin 40mg daily due to the SE of fatigue on higher dose.      CAD: No recent acute exacerbations, hospital course for such as noted above. Patient is currently on blood pressure control with amlodipine 10 mg daily. ACE inhibitor: Ramipril 10 mg daily, beta blocker: metoprolol extended release 50 mg twice daily. Patient is also on aspirin 325 mg daily and plavix 75mg daily. Statin, atorvastatin 40 mg daily. Patient following with cardiology.      Carotid Artery Disease w/ TIA:  Fairly stable carotid artery stenosis and left CEA.    Left sided CEA: Following w/ vascular surgery as noted above.    Repeat ultrasound showed atherosclerotic disease with estimated stenosis of 0 to 49% bilaterally.     COPD:  Positive PFT showing moderate airflow obstruction and COPD. Patient reports that he is largely asymptomatic unless he undergoes significant exertion in which he will feel out of breath after such. Patient denies any chest pain, palpitations, significant shift lightheadedness or dizziness. Not currently on any controller medications.      Current Smoker: Patient is reluctant to discuss quitting.     CKD Stage II: Baseline creatinine of 1.2-1.4.     Nonrheumatic Severe Aortic Valve Stenosis:  Showed mild left ventricular hypertrophy.  Did not meet criteria for diastolic dysfunction.  EF was approximately 65%.  Patient does have aortic valve moderately sclerotic.  Moderate aortic stenosis present. Patient reports that he needs his teeth pulled prior to surgery. Plans for such in 3 months. Will need to stop his plavix for such, reports he has already discussed this w/ his cardiologist from his hospitalization.      GERD: Otc antacids. Reports no sig. issues     Labs:  Hemoglobin 13.4, hematocrit 39.5, platelets 758. CMP shows a creatinine of 1.39, glucose 105, BUN 24, potassium 5.4. Total cholesterol 126, HDL 49, LDL 60, triglycerides 87. Vitamin D 11. Review of Systems   Constitutional: Negative for chills and fever. HENT: Negative for congestion, rhinorrhea and sore throat. Respiratory: Negative for shortness of breath and wheezing. Cardiovascular: Negative for chest pain and leg swelling. Gastrointestinal: Negative for abdominal pain, constipation, diarrhea, nausea and vomiting. Genitourinary: Positive for difficulty urinating. Skin: Negative for rash. Neurological: Negative for light-headedness and headaches.        Past Medical History:   Diagnosis Date    Aortic stenosis, severe 7/6/2018    Aortic valve disorder     Arthritis     CAD (coronary artery disease)     Chronic kidney disease     stage 2    COPD (chronic obstructive pulmonary disease) (HCC)     Folic acid deficiency     GERD (gastroesophageal reflux disease)     Hyperlipidemia     Hypertension     Symptomatic stenosis of left carotid artery without infarction 07/05/2018    Tobacco dependence     Transient cerebral ischemia     Vitamin B 12 deficiency        Prior to Visit Medications    Medication Sig Taking? Authorizing Provider   ramipril (ALTACE) 10 MG capsule Take 1 capsule by mouth daily Yes Cash Serna MD   amLODIPine (NORVASC) 5 MG tablet Take 1 tablet by mouth daily Yes Cash Serna MD   metoprolol succinate (TOPROL XL) 50 MG extended release tablet Take 1 tablet by mouth 2 times daily Yes Cash Serna MD   vitamin D (ERGOCALCIFEROL) 1.25 MG (44157 UT) CAPS capsule Take 1 capsule by mouth once a week Yes Cash Serna MD   atorvastatin (LIPITOR) 80 MG tablet Take 1 tablet by mouth nightly Yes Cash Serna MD   clopidogrel (PLAVIX) 75 MG tablet Take 1 tablet by mouth daily Yes Cruzito Thompson MD   magnesium (MAGNESIUM-OXIDE) 250 MG TABS tablet Take 250 mg by mouth daily Yes Historical Provider, MD   Cyanocobalamin (VITAMIN B12 PO) Take 2,500 mcg by mouth daily Yes Historical Provider, MD   aspirin 325 MG tablet Take 1 tablet by mouth daily Yes Cash Serna MD        No Known Allergies    Social History     Tobacco Use    Smoking status: Current Every Day Smoker     Packs/day: 1.00     Years: 50.00     Pack years: 50.00     Types: Cigarettes     Start date: 1968    Smokeless tobacco: Never Used    Tobacco comment: Does not want to quit. Substance Use Topics    Alcohol use: No           Vitals:    07/28/21 0935   BP: 92/60   Pulse: 60   Resp: 18   Temp: 97.2 °F (36.2 °C)   TempSrc: Temporal   SpO2: 99%   Weight: 154 lb (69.9 kg)   Height: 5' 6\" (1.676 m)     Estimated body mass index is 24.86 kg/m² as calculated from the following:    Height as of this encounter: 5' 6\" (1.676 m). Weight as of this encounter: 154 lb (69.9 kg). Physical Exam  Constitutional:       Appearance: He is well-developed. HENT:      Head: Normocephalic. Eyes:      Extraocular Movements: Extraocular movements intact.       Conjunctiva/sclera: Conjunctivae normal.   Cardiovascular: Rate and Rhythm: Normal rate and regular rhythm. Heart sounds: Normal heart sounds. No murmur heard. Pulmonary:      Effort: Pulmonary effort is normal.      Breath sounds: Normal breath sounds. No wheezing or rales. Abdominal:      General: Bowel sounds are normal.      Palpations: Abdomen is soft. Tenderness: There is no abdominal tenderness. Musculoskeletal:      Right lower leg: No edema. Left lower leg: No edema. Neurological:      General: No focal deficit present. Mental Status: He is alert. Comments: Cranial nerves grossly intact   Psychiatric:         Mood and Affect: Mood normal.         Judgment: Judgment normal.         ASSESSMENT/PLAN:  Linus Patel was seen today for hypertension. Diagnoses and all orders for this visit:    Mixed hyperlipidemia  -     Lipid Panel; Future  Patient is tolerating statin medication well without any side effects. Patient reports compliance. Most recent lipid panel appropriate as noted above. Patient to follow-up with cardiology as scheduled. Coronary artery disease involving native coronary artery of native heart  -     ramipril (ALTACE) 10 MG capsule; Take 1 capsule by mouth daily  -     amLODIPine (NORVASC) 5 MG tablet; Take 1 tablet by mouth daily  -     metoprolol succinate (TOPROL XL) 50 MG extended release tablet; Take 1 tablet by mouth 2 times daily  Status post additional CASSIE. Patient is on dual antiplatelet therapy for 6 months. Patient has been advised by his cardiologist during his hospitalization per report that he may discontinue his dual platelet antitherapy temporarily for dental extractions in approximately 2 to 3 months. Patient is on appropriate therapy. No signs or symptoms of acute ACS/angina on today's exam.  Patient follow-up with cardiology. Essential hypertension  -     ramipril (ALTACE) 10 MG capsule; Take 1 capsule by mouth daily  -     Comprehensive Metabolic Panel;  Future  -     CBC Auto Differential; Future  Blood pressure slightly low today. We will decrease the patient's amlodipine to 5 mg daily. Patient is to check his blood pressure regularly and contact us for blood pressures less than 100/65. Goal is to keep blood pressure closer to 120/75. Patient is asymptomatic. Aortic valve stenosis, etiology of cardiac valve disease unspecified  Plans for TAVR in the near future once the patient has had dental extraction and followed up with cardiology in regards to his recent stent placement. Continue to follow. Tobacco dependence  Patient is precontemplative. Not interested in decreasing tobacco use. No increase in usage. Tobacco cessation advised. Vitamin D deficiency  -     vitamin D (ERGOCALCIFEROL) 1.25 MG (75438 UT) CAPS capsule; Take 1 capsule by mouth once a week  -     Vitamin D 25 Hydroxy; Future    Stage 2 chronic kidney disease  -     Comprehensive Metabolic Panel; Future  -     CBC Auto Differential; Future  Stable. Baseline creatinine anywhere from 1.2-1.4. Panlobular emphysema (HCC)  Stable. Patient reports he is asymptomatic. Not currently on any medication. Rescue inhaler as needed. Consider controller medication with any new or worsening symptoms. Gastroesophageal reflux disease, unspecified whether esophagitis present  Continue OTC medications as needed. Stable. Hemispheric carotid artery syndrome  Carotid ultrasound has been stable. Following with vascular surgery. Repeat ultrasound showed atherosclerotic disease with estimated stenosis of 0 to 49% bilaterally. Labs:  Hemoglobin 13.4, hematocrit 39.5, platelets 154. CMP shows a creatinine of 1.39, glucose 105, BUN 24, potassium 5.4. Total cholesterol 126, HDL 49, LDL 60, triglycerides 87. Vitamin D 11. Patient's hospitalization and records were reviewed during today's office appointment today. Greater than 43 minutes was spent in total on this encounter.     Return in about 4 months (around 11/28/2021). An electronicsignature was used to authenticate this note.     --Thierry Bolden MD on 7/28/21 at 8:08 AM EDT

## 2021-09-10 LAB — FECAL BLOOD IMMUNOCHEMICAL TEST: NEGATIVE

## 2021-09-22 RX ORDER — CLOPIDOGREL BISULFATE 75 MG/1
75 TABLET ORAL DAILY
Qty: 90 TABLET | Refills: 0 | Status: SHIPPED
Start: 2021-09-22 | End: 2021-12-01 | Stop reason: SDUPTHER

## 2021-09-29 DIAGNOSIS — I10 ESSENTIAL HYPERTENSION: ICD-10-CM

## 2021-09-29 DIAGNOSIS — I25.10 CORONARY ARTERY DISEASE INVOLVING NATIVE CORONARY ARTERY OF NATIVE HEART WITHOUT ANGINA PECTORIS: ICD-10-CM

## 2021-09-29 RX ORDER — RAMIPRIL 10 MG/1
CAPSULE ORAL
Qty: 90 CAPSULE | Refills: 0 | Status: SHIPPED
Start: 2021-09-29 | End: 2021-12-01 | Stop reason: SDUPTHER

## 2021-11-24 LAB
ALBUMIN SERPL-MCNC: 4.2 G/DL
ALP BLD-CCNC: 96 U/L
ALT SERPL-CCNC: 13 U/L
ANION GAP SERPL CALCULATED.3IONS-SCNC: NORMAL MMOL/L
AST SERPL-CCNC: 13 U/L
BASOPHILS ABSOLUTE: 50 /ΜL
BASOPHILS RELATIVE PERCENT: 0.5 %
BILIRUB SERPL-MCNC: 0.8 MG/DL (ref 0.1–1.4)
BUN BLDV-MCNC: 24 MG/DL
CALCIUM SERPL-MCNC: 9.2 MG/DL
CHLORIDE BLD-SCNC: 105 MMOL/L
CHOLESTEROL, TOTAL: 134 MG/DL
CHOLESTEROL/HDL RATIO: 2.3
CO2: 29 MMOL/L
CREAT SERPL-MCNC: 1.19 MG/DL
EOSINOPHILS ABSOLUTE: 70 /ΜL
EOSINOPHILS RELATIVE PERCENT: 0.7 %
GFR CALCULATED: 61
GLUCOSE BLD-MCNC: 104 MG/DL
HCT VFR BLD CALC: 42.4 % (ref 41–53)
HDLC SERPL-MCNC: 58 MG/DL (ref 35–70)
HEMOGLOBIN: 13.9 G/DL (ref 13.5–17.5)
LDL CHOLESTEROL CALCULATED: 61 MG/DL (ref 0–160)
LYMPHOCYTES ABSOLUTE: 1550 /ΜL
LYMPHOCYTES RELATIVE PERCENT: 15.5 %
MCH RBC QN AUTO: 29.1 PG
MCHC RBC AUTO-ENTMCNC: 32.8 G/DL
MCV RBC AUTO: 88.9 FL
MONOCYTES ABSOLUTE: 730 /ΜL
MONOCYTES RELATIVE PERCENT: 7.3 %
NEUTROPHILS ABSOLUTE: 7600 /ΜL
NEUTROPHILS RELATIVE PERCENT: 76 %
NONHDLC SERPL-MCNC: 76 MG/DL
PDW BLD-RTO: 12.2 %
PLATELET # BLD: 147 K/ΜL
PMV BLD AUTO: 11.8 FL
POTASSIUM SERPL-SCNC: 4.8 MMOL/L
RBC # BLD: NORMAL 10*6/UL
SODIUM BLD-SCNC: 140 MMOL/L
TOTAL PROTEIN: 6.7
TRIGL SERPL-MCNC: 70 MG/DL
VITAMIN D 25-HYDROXY: 74
VITAMIN D2, 25 HYDROXY: NORMAL
VITAMIN D3,25 HYDROXY: NORMAL
VLDLC SERPL CALC-MCNC: NORMAL MG/DL
WBC # BLD: 10 10^3/ML

## 2021-12-01 ENCOUNTER — OFFICE VISIT (OUTPATIENT)
Dept: PRIMARY CARE CLINIC | Age: 71
End: 2021-12-01
Payer: MEDICARE

## 2021-12-01 VITALS
WEIGHT: 156 LBS | BODY MASS INDEX: 25.07 KG/M2 | TEMPERATURE: 97.5 F | RESPIRATION RATE: 18 BRPM | SYSTOLIC BLOOD PRESSURE: 128 MMHG | HEIGHT: 66 IN | OXYGEN SATURATION: 100 % | HEART RATE: 61 BPM | DIASTOLIC BLOOD PRESSURE: 82 MMHG

## 2021-12-01 DIAGNOSIS — Z00.00 ROUTINE GENERAL MEDICAL EXAMINATION AT A HEALTH CARE FACILITY: ICD-10-CM

## 2021-12-01 DIAGNOSIS — N18.2 STAGE 2 CHRONIC KIDNEY DISEASE: ICD-10-CM

## 2021-12-01 DIAGNOSIS — Z87.891 PERSONAL HISTORY OF TOBACCO USE, PRESENTING HAZARDS TO HEALTH: ICD-10-CM

## 2021-12-01 DIAGNOSIS — Z71.89 ACP (ADVANCE CARE PLANNING): ICD-10-CM

## 2021-12-01 DIAGNOSIS — E78.2 MIXED HYPERLIPIDEMIA: Primary | ICD-10-CM

## 2021-12-01 DIAGNOSIS — I25.10 CORONARY ARTERY DISEASE INVOLVING NATIVE CORONARY ARTERY OF NATIVE HEART WITHOUT ANGINA PECTORIS: ICD-10-CM

## 2021-12-01 DIAGNOSIS — K21.9 GASTROESOPHAGEAL REFLUX DISEASE, UNSPECIFIED WHETHER ESOPHAGITIS PRESENT: ICD-10-CM

## 2021-12-01 DIAGNOSIS — J43.1 PANLOBULAR EMPHYSEMA (HCC): ICD-10-CM

## 2021-12-01 DIAGNOSIS — I10 ESSENTIAL HYPERTENSION: ICD-10-CM

## 2021-12-01 DIAGNOSIS — I35.0 AORTIC VALVE STENOSIS, ETIOLOGY OF CARDIAC VALVE DISEASE UNSPECIFIED: ICD-10-CM

## 2021-12-01 DIAGNOSIS — E55.9 VITAMIN D DEFICIENCY: ICD-10-CM

## 2021-12-01 PROCEDURE — 99497 ADVNCD CARE PLAN 30 MIN: CPT | Performed by: FAMILY MEDICINE

## 2021-12-01 PROCEDURE — 99406 BEHAV CHNG SMOKING 3-10 MIN: CPT | Performed by: FAMILY MEDICINE

## 2021-12-01 PROCEDURE — G0439 PPPS, SUBSEQ VISIT: HCPCS | Performed by: FAMILY MEDICINE

## 2021-12-01 RX ORDER — ATORVASTATIN CALCIUM 80 MG/1
80 TABLET, FILM COATED ORAL NIGHTLY
Qty: 90 TABLET | Refills: 1 | Status: SHIPPED
Start: 2021-12-01 | End: 2022-01-19

## 2021-12-01 RX ORDER — CLOPIDOGREL BISULFATE 75 MG/1
75 TABLET ORAL DAILY
Qty: 90 TABLET | Refills: 1 | Status: SHIPPED
Start: 2021-12-01 | End: 2022-08-10 | Stop reason: ALTCHOICE

## 2021-12-01 RX ORDER — RAMIPRIL 10 MG/1
10 CAPSULE ORAL DAILY
Qty: 90 CAPSULE | Refills: 1 | Status: SHIPPED
Start: 2021-12-01 | End: 2022-07-05 | Stop reason: SDUPTHER

## 2021-12-01 RX ORDER — ERGOCALCIFEROL 1.25 MG/1
50000 CAPSULE ORAL WEEKLY
Qty: 12 CAPSULE | Refills: 2 | Status: SHIPPED | OUTPATIENT
Start: 2021-12-01

## 2021-12-01 ASSESSMENT — PATIENT HEALTH QUESTIONNAIRE - PHQ9
SUM OF ALL RESPONSES TO PHQ QUESTIONS 1-9: 0
2. FEELING DOWN, DEPRESSED OR HOPELESS: 0
SUM OF ALL RESPONSES TO PHQ9 QUESTIONS 1 & 2: 0
SUM OF ALL RESPONSES TO PHQ QUESTIONS 1-9: 0
1. LITTLE INTEREST OR PLEASURE IN DOING THINGS: 0
SUM OF ALL RESPONSES TO PHQ QUESTIONS 1-9: 0

## 2021-12-01 ASSESSMENT — LIFESTYLE VARIABLES: HOW OFTEN DO YOU HAVE A DRINK CONTAINING ALCOHOL: 0

## 2021-12-01 NOTE — PROGRESS NOTES
Medicare Annual Wellness Visit  Name: Shahzad Bagley Date: 2021   MRN: [id number] Sex: Male   Age: 66 y.o. Ethnicity: Non- / Non    : 1950 Race: White (non-)      Royal Colon is here for Medicare AWV    Screenings for behavioral, psychosocial and functional/safety risks, and cognitive dysfunction are all negative except as indicated below. These results, as well as other patient data from the [address] form, are documented in Flowsheets linked to this Encounter. Pt is a 58-year-old male with a past medical history of hypertension, hyperlipidemia, history of coronary artery disease, carotid artery disease and left sided CEA, COPD, current smoker, CKD stage II, nonrheumatic aortic valve stenosis.     Patient presents today for a routine follow-up and annual exam.      Recently patient underwent elective cardiac catheterization in preparation of AVR and was admitted post procedure. Patient had a 95% stenosis of his ostial RCA. A single drug-eluting stent was placed. Plans for TAVR in the near future. Patient is recommended to be on dual antiplatelet therapy for at least 6 months after the placement of CASSIE.    HTN: Stable, below 150/90 which is his goal. Patient is currently on amlodipine 10 mg daily, ramipril 10 mg daily, Metoprolol ER 50 mg twice daily.     HLD: Atorvastatin 40mg daily due to the SE of fatigue on higher dose.      CAD: No recent acute exacerbations. Patient is currently on blood pressure control with amlodipine 10 mg daily. ACE inhibitor: Ramipril 10 mg daily, beta blocker: metoprolol extended release 50 mg twice daily. Patient is also on aspirin 325 mg daily and plavix 75mg daily. Statin, atorvastatin 40 mg daily. Patient following with cardiology.      Carotid Artery Disease w/ TIA:  Fairly stable carotid artery stenosis and left CEA.       Left sided CEA: Following w/ vascular surgery as noted above.   Repeat ultrasound showed atherosclerotic disease with estimated stenosis of 0 to 49% bilaterally.     COPD:  Positive PFT showing moderate airflow obstruction and COPD. Patient reports that he is largely asymptomatic unless he undergoes significant exertion in which he will feel out of breath after such. Patient denies any chest pain, palpitations, significant shift lightheadedness or dizziness. Not currently on any controller medications.      Current Smoker: Patient is reluctant to discuss quitting.     CKD Stage II: Baseline creatinine of 1.2-1.4.     Nonrheumatic Severe Aortic Valve Stenosis:  Showed mild left ventricular hypertrophy.  Did not meet criteria for diastolic dysfunction.  EF was approximately 65%.  Patient does have aortic valve moderately sclerotic.  Moderate aortic stenosis present. Patient reports that he needs his teeth pulled prior to surgery. Plans for such in 3 months. Will need to stop his plavix for such, reports he has already discussed this w/ his cardiologist from his hospitalization.      GERD: Otc antacids. Reports no sig. issues    Labs Pending. No Known Allergies      Prior to Visit Medications    Medication Sig Taking?  Authorizing Provider   clopidogrel (PLAVIX) 75 MG tablet Take 1 tablet by mouth daily Yes Marco Constantino MD   ramipril (ALTACE) 10 MG capsule Take 1 capsule by mouth daily Yes Marco Constantino MD   atorvastatin (LIPITOR) 80 MG tablet Take 1 tablet by mouth nightly Yes Marco Constantino MD   vitamin D (ERGOCALCIFEROL) 1.25 MG (60485 UT) CAPS capsule Take 1 capsule by mouth once a week Yes Marco Constantino MD   amLODIPine (NORVASC) 5 MG tablet Take 1 tablet by mouth daily Yes Marco Constantino MD   metoprolol succinate (TOPROL XL) 50 MG extended release tablet Take 1 tablet by mouth 2 times daily Yes Marco Constantino MD   magnesium (MAGNESIUM-OXIDE) 250 MG TABS tablet Take 250 mg by mouth daily Yes Historical Provider, MD   Cyanocobalamin (VITAMIN B12 PO) Take 2,500 mcg by mouth daily Yes Historical Provider, MD   aspirin 325 MG tablet Take 1 tablet by mouth daily Yes Sharon Fernandez MD         Past Medical History:   Diagnosis Date    Aortic stenosis, severe 7/6/2018    Aortic valve disorder     Arthritis     CAD (coronary artery disease)     Chronic kidney disease     stage 2    [facility]     Folic acid deficiency     GERD (gastroesophageal reflux disease)     Hyperlipidemia     Hypertension     Symptomatic stenosis of left carotid artery without infarction 07/05/2018    Tobacco dependence     Transient cerebral ischemia     Vitamin B 12 deficiency        Past Surgical History:   Procedure Laterality Date    CARDIAC SURGERY      CAROTID ENDARTERECTOMY Left 07/06/2018    Geri Garcia    CORONARY ANGIOPLASTY WITH STENT PLACEMENT  06/01/2021    Dr Theresa Campo xiencaidan 3.5x15 rca    CORONARY ARTERY BYPASS GRAFT  2008    AR REOPER, CAROTID ENDARTEC>1 MON Left 07/06/2018    LEFT CAROTID ENDARTERECTOMY performed by Grace Mccormick [place] [facility] [place]         Family History   Problem Relation Age of Onset    High Blood Pressure Mother     Stroke Father     High Blood Pressure Father     Breast Cancer Sister     Other Sister        CareTeam (Including outside providers/suppliers regularly involved in providing care):   Patient Care Team:  Sharon Fernandez MD as PCP - General (Family Medicine)  Sharon Fernandez MD as PCP - [facility] Empaneled Provider    Wt Readings from Last 3 Encounters:   12/01/21 156 lb (70.8 kg)   07/28/21 154 lb (69.9 kg)   06/02/21 163 lb 3.2 oz (74 kg)     Vitals:    12/01/21 1008   BP: 128/82   Pulse: 61   Resp: 18   Temp: 97.5 °F (36.4 °C)   TempSrc: Temporal   SpO2: 100%   Weight: 156 lb (70.8 kg)   Height: 5' 6\" (1.676 m)     Body mass index is 25.18 kg/m². Based upon direct observation of the patient, evaluation of cognition reveals recent and remote memory intact.     General Appearance: alert and oriented to person, place and time, well developed and well- nourished, in no acute distress  Skin: warm and dry, no rash or erythema  Head: normocephalic and atraumatic  Eyes: pupils equal, round, and reactive to light, extraocular eye movements intact, conjunctivae normal  ENT: tympanic membrane, external ear and ear canal normal bilaterally, nose without deformity  Neck: supple and non-tender without mass  Pulmonary/Chest: clear to auscultation bilaterally- no wheezes, rales or rhonchi, normal air movement, no respiratory distress  Cardiovascular: normal rate, regular rhythm, normal S1 and S2, + murmurs, rubs, clicks, or gallops, distal pulses intact  Abdomen: soft, non-tender, non-distended, normal bowel sounds, no masses or organomegaly  Extremities: no edema  Musculoskeletal: normal range of motion, no joint swelling, deformity or tenderness  Neurologic: no cranial nerve deficit, gait, coordination and speech normal    Patient's complete Health Risk Assessment and screening values have been reviewed and are found in Flowsheets. The following problems were reviewed today and where indicated follow up appointments were made and/or referrals ordered. Positive Risk Factor Screenings with Interventions:         Substance History:  Social History     Tobacco History     Smoking Status  Current Every Day Smoker Smoking Start Date  1/1/1968 Smoking Frequency  1 pack/day for 50 years (48 pk yrs) Smoking Tobacco Type  Cigarettes    Smokeless Tobacco Use  Never Used    Tobacco Comment  Does not want to quit. Alcohol History     Alcohol Use Status  No          Drug Use     Drug Use Status  No          Sexual Activity     Sexually Active  Not Asked               Alcohol Screening:       A score of 8 or more is associated with harmful or hazardous drinking. A score of 13 or more in women, and 15 or more in men, is likely to indicate alcohol dependence.   Substance Abuse Interventions:  · Tobacco abuse:  tobacco cessation tips and resources provided, patient is not ready to work toward tobacco cessation at this time    [address] and ACP:  General  In general, how would you say your health is?: Fair  In the past 7 days, have you experienced any of the following?  New or Increased Pain, New or Increased Fatigue, Loneliness, Social Isolation, Stress or Anger?: None of These  Do you get the social and emotional support that you need?: Yes  Do you have a Living Will?: (!) No  Advance Directives     Power of [address] Will ACP-Advance Directive ACP-Power of     Not on File Not on File Not on File Not on File      General Health Risk Interventions:  · No Living Will: Advance Care Planning addressed with patient today    Health Habits/Nutrition:  Health Habits/Nutrition  Do you exercise for at least 20 minutes 2-3 times per week?: (!) No  Have you lost any weight without trying in the past 3 months?: No  Do you eat only one meal per day?: (!) Yes  Have you seen the dentist within the past year?: Yes  Body mass index: (!) 25.18  Health Habits/Nutrition Interventions:  · Inadequate physical activity:  educational materials provided to promote increased physical activity  · Nutritional issues:  educational materials for healthy, well-balanced diet provided, patient is not ready to address his/her nutritional/weight issues at this time    Hearing/Vision:  No exam data present  Hearing/Vision  Do you or your family notice any trouble with your hearing that hasn't been managed with hearing aids?: (!) Yes  Do you have difficulty driving, watching TV, or doing any of your daily activities because of your eyesight?: (!) Yes  Have you had an eye exam within the past year?: Yes  Hearing/Vision Interventions:  · Hearing concerns:  patient declines any further evaluation/treatment for hearing issues  · Vision concerns:  patient encouraged to make appointment with his/her eye specialist, patient declines any further evaluation/treatment for this issue    Safety:  Safety  Do you have working smoke detectors?: Yes  Have all throw rugs been removed or fastened?: (!) No  Do you have non-slip mats or surfaces in all bathtubs/showers?: Yes  Do all of your stairways have a railing or banister?: Yes  Are your doorways, halls and stairs free of clutter?: Yes  Do you always fasten your seatbelt when you are in a car?: Yes  Safety Interventions:  · Home safety tips provided  · Patient declines any further evaluation/treatment for this issue     Personalized Preventive Plan   Current Health Maintenance Status  Immunization History   Administered Date(s) Administered    COVID-19, Claudeen Fermo, Primary or Immunocompromised, PF, 100mcg/0.5mL 02/18/2021, 03/18/2021    Influenza Vaccine, unspecified formulation 10/17/2011, 10/22/2012, 10/10/2013, 10/03/2016    Influenza Virus Vaccine 10/17/2011, 10/22/2012, 10/10/2013, 10/03/2016    Influenza, High Dose (Fluzone 65 yrs and older) 10/12/2018, 10/11/2019    Influenza, Quadv, IM, PF (6 mo and older Fluzone, Flulaval, Fluarix, and 3 yrs and older Afluria) 10/01/2020    Pneumococcal Conjugate 13-valent ([id number]) 12/10/2014    Pneumococcal Polysaccharide ([id number]) 12/09/2019    Tdap (Boostrix, Adacel) 07/22/2020    Varicella (Varivax) 10/03/2016        Health Maintenance   Topic Date Due    Shingles Vaccine (1 of 2) 11/28/2016    Annual Wellness Visit (AWV)  09/10/2021    COVID-19 Vaccine (3 - Booster for Moderna series) 09/18/2021    Lipid screen  07/22/2022    Potassium monitoring  07/22/2022    Creatinine monitoring  07/22/2022    Colon Cancer Screen FIT/FOBT  09/10/2022    DTaP/Tdap/Td vaccine (2 - Td or Tdap) 07/22/2030    Flu vaccine  Completed    Pneumococcal 65+ years Vaccine  Completed    AAA screen  Completed    Hepatitis A vaccine  Aged Out    Hepatitis B vaccine  Aged Out    Hib vaccine  Aged Out    Meningococcal (ACWY) vaccine  Aged Out    Hepatitis C screen  Discontinued    Low dose CT lung screening  Discontinued     Recommendations for Preventive Services Due: see orders and patient instructions/AVS.  . Recommended screening schedule for the next 5-10 years is provided to the patient in written form: see Patient Instructions/AVS.    Zahraa Trivedi was seen today for medicare aw. Diagnoses and all orders for this visit:    Mixed hyperlipidemia  Patient is currently on statin. Tolerating well without issue. Following with cardiology. Essential hypertension  -     ramipril (ALTACE) 10 MG capsule; Take 1 capsule by mouth daily  Blood pressure is well controlled today. Continue current dosage of medications. Refills provided. ACP (advance care planning)  -     SC ADVANCED CARE PLAN FACE TO FACE, 1ST 30MIN [id number]    Personal history of tobacco use, presenting hazards to health  -     SC TOBACCO USE CESSATION INTERMEDIATE 3-10 MINUTES [23169]    Routine general medical examination at a health care facility  Age-appropriate conditions reviewed and advised. Up-to-date on Covid booster and flu shot. Only other immunization due his shingles vaccine. Coronary artery disease involving native coronary artery of native heart  -     atorvastatin (LIPITOR) 80 MG tablet; Take 1 tablet by mouth nightly  Status post additional CASSIE. Patient is on dual antiplatelet therapy for 6 months. Patient has been advised by his cardiologist during his hospitalization per report that he may discontinue his dual platelet antitherapy temporarily for dental extractions in approximately 1 months. Was to already have his teeth extracted but has yet to complete such. Patient is on appropriate therapy. No signs or symptoms of acute ACS/angina on today's exam.  Patient follow-up with cardiology    Vitamin D deficiency  -     vitamin D (ERGOCALCIFEROL) 1.25 MG (66926 UT) CAPS capsule;  Take 1 capsule by mouth once a week    Aortic valve stenosis, etiology of cardiac valve disease unspecified  Plans for TAVR in the near future once the patient has had dental extraction and followed up with cardiology in regards to his recent stent placement. Continue to follow. Stage 2 chronic kidney disease  Pending repeat labs. Panlobular emphysema (HCC)  Stable. Patient reports he is asymptomatic. Not currently on any medication. Rescue inhaler as needed. Consider controller medication with any new or worsening symptoms. Gastroesophageal reflux disease, unspecified whether esophagitis present  OTC medications as needed. Other orders  -     clopidogrel (PLAVIX) 75 MG tablet; Take 1 tablet by mouth daily    Labs pending. Will call with results. Otherwise follow-up in 4 months. Patient to have dental extraction and then valve replacement surgery. Patient should follow-up shortly after the completion of such if prior to 4 months. Advance Care Planning   Advanced Care Planning: Discussed the patients choices for care and treatment in case of a health event that adversely affects decision-making abilities. Also discussed the patients long-term treatment options. Reviewed with the patient the [address] [place] [address] of [address] [place] [address] Declaration forms  Reviewed the process of designating a competent adult as an Agent (or -in-fact) that could take make health care decisions for the patient if incompetent. Patient was asked to complete the declaration forms, either acknowledge the forms by a public notary or an eligible witness and provide a signed copy to the practice office. Time spent (minutes): 20     Tobacco Cessation Counseling: Patient advised about behavior change, including information about personal health harms, usage of appropriate cessation measures and benefits of cessation.   Time spent (minutes): 5

## 2021-12-01 NOTE — PATIENT INSTRUCTIONS
Advance Directives: Care Instructions  Overview  An advance directive is a legal way to state your wishes at the end of your life. It tells your family and your doctor what to do if you can't say what you want. There are two main types of advance directives. You can change them any time your wishes change. Living will. This form tells your family and your doctor your wishes about life support and other treatment. The form is also called a declaration. Medical power of . This form lets you name a person to make treatment decisions for you when you can't speak for yourself. This person is called a health care agent (health care proxy, health care surrogate). The form is also called a durable power of  for health care. If you do not have an advance directive, decisions about your medical care may be made by a family member, or by a doctor or a  who doesn't know you. It may help to think of an advance directive as a gift to the people who care for you. If you have one, they won't have to make tough decisions by themselves. Follow-up care is a key part of your treatment and safety. Be sure to make and go to all appointments, and call your doctor if you are having problems. It's also a good idea to know your test results and keep a list of the medicines you take. What should you include in an advance directive? Many states have a unique advance directive form. (It may ask you to address specific issues.) Or you might use a universal form that's approved by many states. If your form doesn't tell you what to address, it may be hard to know what to include in your advance directive. Use the questions below to help you get started. · Who do you want to make decisions about your medical care if you are not able to? · What life-support measures do you want if you have a serious illness that gets worse over time or can't be cured? · What are you most afraid of that might happen? (Maybe you're afraid of having pain, losing your independence, or being kept alive by machines.)  · Where would you prefer to die? (Your home? A hospital? A nursing home?)  · Do you want to donate your organs when you die? · Do you want certain Tenriism practices performed before you die? When should you call for help? Be sure to contact your doctor if you have any questions. Where can you learn more? Go to https://[email or web address] [email or web address] org and sign in to your GenNext Media account. Enter R264 in the Insurance Noodle box to learn more about \"Advance Directives: Care Instructions. \"     If you do not have an account, please click on the \"Sign Up Now\" link. Current as of: March 17, 2021               Content Version: 13.0  © 2006-2021 Healthwise, Reachoo. Care instructions adapted under license by [facility] ([facility]). If you have questions about a medical condition or this instruction, always ask your healthcare professional. [place] [address] any warranty or liability for your use of this information. Learning About Medical Power of   What is a medical power of ? A medical power of , also called a durable power of  for health care, is one type of the legal forms called advance directives. It lets you name the person you want to make treatment decisions for you if you can't speak or decide for yourself. The person you choose is called your health care agent. This person is also called a health care proxy or health care surrogate. A medical power of  may be called something else in your state. How do you choose a health care agent? Choose your health care agent carefully. This person may or may not be a family member. Talk to the person before you make your final decision. Make sure he or she is comfortable with this responsibility. It's a good idea to choose someone who:  · Is at least 25years old.   · Knows you well and understands what makes life meaningful for you. · Understands your Latter day and moral values. · Will do what you want, not what he or she wants. · Will be able to make difficult choices at a stressful time. · Will be able to refuse or stop treatment, if that is what you would want, even if you could die. · Will be firm and confident with health professionals if needed. · Will ask questions to get needed information. · Lives near you or agrees to travel to you if needed. Your family may help you make medical decisions while you can still be part of that process. But it's important to choose one person to be your health care agent in case you aren't able to make decisions for yourself. If you don't fill out the legal form and name a health care agent, the decisions your family can make may be limited. A health care agent may be called something else in your state. Who will make decisions for you if you don't have a health care agent? If you don't have a health care agent or a living will, you may not get the care you want. Decisions may be made by family members who disagree about your medical care. Or decisions may be made by a medical professional who doesn't know you well. In some cases, a  makes the decisions. When you name a health care agent, it is very clear who has the power to make health decisions for you. How do you name a health care agent? You name your health care agent on a legal form. This form is usually called a medical power of . Ask your hospital, state bar association, or office on aging where to find these forms. You must sign the form to make it legal. Some states require you to get the form notarized. This means that a person called a  watches you sign the form and then he or she signs the form. Some states also require that two or more witnesses sign the form. Be sure to tell your family members and doctors who your health care agent is.   Where can you learn more? Go to https://chpepiceweb.TetraVitae Bioscience. org and sign in to your GetYourGuide account. Enter [id number] in the [facility] box to learn more about \"Learning About [address]. \"     If you do not have an account, please click on the \"Sign Up Now\" link. Current as of: March 17, 2021               Content Version: 13.0  © 2006-2021 [employer]. Care instructions adapted under license by [employer]. If you have questions about a medical condition or this instruction, always ask your healthcare professional. [address] any warranty or liability for your use of this information. Learning About Living Scot Croft  What is a living will? A living will, also called a declaration, is a legal form. It tells your family and your doctor your wishes when you can't speak for yourself. It's used by the health professionals who will treat you as you near the end of your life or if you get seriously hurt or ill. If you put your wishes in writing, your loved ones and others will know what kind of care you want. They won't need to guess. This can ease your mind and be helpful to others. And you can change or cancel your living will at any time. A living will is not the same as an estate or property will. An estate will explains what you want to happen with your money and property after you die. How do you use it? A living will is used to describe the kinds of treatment or life support you want as you near the end of your life or if you get seriously hurt or ill. Keep these facts in mind about living valencia. · Your living will is used only if you can't speak or make decisions for yourself. Most often, one or more doctors must certify that you can't speak or decide for yourself before your living will takes effect. · If you get better and can speak for yourself again, you can accept or refuse any treatment.  It doesn't matter what you said in your living will. · Some states may limit your right to refuse treatment in certain cases. For example, you may need to clearly state in your living will that you don't want artificial hydration and nutrition, such as being fed through a tube. Is a living will a legal document? A living will is a legal document. Each state has its own laws about living valencia. And a living will may be called something else in your state. Here are some things to know about living valencia. · You don't need an  to complete a living will. But legal advice can be helpful if your state's laws are unclear. It can also help if your health history is complicated or your family can't agree on what should be in your living will. · You can change your living will at any time. Some people find that their wishes about end-of-life care change as their health changes. If you make big changes to your living will, complete a new form. · If you move to another state, make sure that your living will is legal in the state where you now live. In most cases, doctors will respect your wishes even if you have a form from a different state. · You might use a universal form that has been approved by many states. This kind of form can sometimes be filled out and stored online. Your digital copy will then be available wherever you have a connection to the internet. The doctors and nurses who need to treat you can find it right away. · Your state may offer an online registry. This is another place where you can store your living will online. · It's a good idea to get your living will notarized. This means using a person called a  to watch two people sign, or witness, your living will. What should you know when you create a living will? Here are some questions to ask yourself as you make your living will:  · Do you know enough about life support methods that might be used?  If not, talk to your doctor so you know what might be done if you can't breathe on your own, your heart stops, or you can't swallow. · What things would you still want to be able to do after you receive life-support methods? Would you want to be able to walk? To speak? To eat on your own? To live without the help of machines? · Do you want certain Jew practices performed if you become very ill? · If you have a choice, where do you want to be cared for? In your home? At a hospital or nursing home? · If you have a choice at the end of your life, where would you prefer to die? At home? In a hospital or nursing home? Somewhere else? · Would you prefer to be buried or cremated? · Do you want your organs to be donated after you die? What should you do with your living will? · Make sure that your family members and your health care agent have copies of your living will (also called a declaration). · Give your doctor a copy of your living will. Ask him or her to keep it as part of your medical record. If you have more than one doctor, make sure that each one has a copy. · Put a copy of your living will where it can be easily found. For example, some people may put a copy on their refrigerator door. If you are using a digital copy, be sure your doctor, family members, and health care agent know how to find and access it. Where can you learn more? Go to https://[email or web address].OilAndGasRecruiter. org and sign in to your Fundly account. Enter K500 in the [facility] box to learn more about \"Learning About Living Brittaney. \"     If you do not have an account, please click on the \"Sign Up Now\" link. Current as of: March 17, 2021               Content Version: 13.0  © 5171-7350 InsureWorx. Care instructions adapted under license by [employer]. If you have questions about a medical condition or this instruction, always ask your healthcare professional. [address] any warranty or liability for your use of this information. Stopping Smoking: Care Instructions  Your Care Instructions     Cigarette smokers crave the nicotine in cigarettes. Giving it up is much harder than simply changing a habit. Your body has to stop craving the nicotine. It is hard to quit, but you can do it. There are many tools that people use to quit smoking. You may find that combining tools works best for you. There are several steps to quitting. First you get ready to quit. Then you get support to help you. After that, you learn new skills and behaviors to become a nonsmoker. For many people, a necessary step is getting and using medicine. Your doctor will help you set up the plan that best meets your needs. You may want to attend a smoking cessation program to help you quit smoking. When you choose a program, look for one that has proven success. Ask your doctor for ideas. You will greatly increase your chances of success if you take medicine as well as get counseling or join a cessation program.  Some of the changes you feel when you first quit tobacco are uncomfortable. Your body will miss the nicotine at first, and you may feel short-tempered and grumpy. You may have trouble sleeping or concentrating. Medicine can help you deal with these symptoms. You may struggle with changing your smoking habits and rituals. The last step is the tricky one: Be prepared for the smoking urge to continue for a time. This is a lot to deal with, but keep at it. You will feel better. Follow-up care is a key part of your treatment and safety. Be sure to make and go to all appointments, and call your doctor if you are having problems. It's also a good idea to know your test results and keep a list of the medicines you take. How can you care for yourself at home? · Ask your family, friends, and coworkers for support. You have a better chance of quitting if you have help and support.   · Join a support group, such as Nicotine Anonymous, for people who are trying to quit smoking. · Consider signing up for a smoking cessation program, such as the American Lung Association's Freedom from Smoking program.  · Get text messaging support. Go to the website at www.smokefree. gov to sign up for the [facility] program.  · Set a quit date. Pick your date carefully so that it is not right in the middle of a big deadline or stressful time. Once you quit, do not even take a puff. Get rid of all ashtrays and lighters after your last cigarette. Clean your house and your clothes so that they do not smell of smoke. · Learn how to be a nonsmoker. Think about ways you can avoid those things that make you reach for a cigarette. ? Avoid situations that put you at greatest risk for smoking. For some people, it is hard to have a drink with friends without smoking. For others, they might skip a coffee break with coworkers who smoke. ? Change your daily routine. Take a different route to work or eat a meal in a different place. · Cut down on stress. Calm yourself or release tension by doing an activity you enjoy, such as reading a book, taking a hot bath, or gardening. · Talk to your doctor or pharmacist about nicotine replacement therapy, which replaces the nicotine in your body. You still get nicotine but you do not use tobacco. Nicotine replacement products help you slowly reduce the amount of nicotine you need. These products come in several forms, many of them available over-the-counter:  ? Nicotine patches  ? Nicotine gum and lozenges  ? Nicotine inhaler  · Ask your doctor about bupropion (Wellbutrin) or varenicline (Chantix), which are prescription medicines. They do not contain nicotine. They help you by reducing withdrawal symptoms, such as stress and anxiety. · Some people find hypnosis, acupuncture, and massage helpful for ending the smoking habit. · Eat a healthy diet and get regular exercise. Having healthy habits will help your body move past its craving for nicotine.   · Be prepared to keep trying. Most people are not successful the first few times they try to quit. Do not get mad at yourself if you smoke again. Make a list of things you learned and think about when you want to try again, such as next week, next month, or next year. Where can you learn more? Go to https://chpepuraewyoel.healthDaily Deals for Moms. org and sign in to your [employer] account. Enter U368 in the [employer] box to learn more about \"Stopping Smoking: Care Instructions. \"     If you do not have an account, please click on the \"Sign Up Now\" link. Current as of: February 11, 2021               Content Version: 13.0  © 2006-2021 Healthwise, Vouchercloud. Care instructions adapted under license by [facility] ([facility]). If you have questions about a medical condition or this instruction, always ask your healthcare professional. [place] [address] any warranty or liability for your use of this information. Learning About Benefits From Quitting Smoking  How does quitting smoking make you healthier? If you're thinking about quitting smoking, you may have a few reasons to be smoke-free. Your health may be one of them. · When you quit smoking, you lower your risks for cancer, lung disease, heart attack, stroke, blood vessel disease, and blindness from macular degeneration. · When you're smoke-free, you get sick less often, and you heal faster. You are less likely to get colds, flu, bronchitis, and pneumonia. · As a nonsmoker, you may find that your mood is better and you are less stressed. When and how will you feel healthier? Quitting has real health benefits that start from day 1 of being smoke-free. And the longer you stay smoke-free, the healthier you get and the better you feel. The first hours  · After just 20 minutes, your blood pressure and heart rate go down. That means there's less stress on your heart and blood vessels.   · Within 12 hours, the level of carbon monoxide in your blood drops back to normal. That makes room for more oxygen. With more oxygen in your body, you may notice that you have more energy than when you smoked. After 2 weeks  · Your lungs start to work better. · Your risk of heart attack starts to drop. After 1 month  · When your lungs are clear, you cough less and breathe deeper, so it's easier to be active. · Your sense of taste and smell return. That means you can enjoy food more than you have since you started smoking. Over the years  · Over the years, your risks of heart disease, heart attack, and stroke are lower. · After 10 years, your risk of dying from lung cancer is cut by about half. And your risk for many other types of cancer is lower too. How would quitting help others in your life? When you quit smoking, you improve the health of everyone who now breathes in your smoke. · Their heart, lung, and cancer risks drop, much like yours. · They are sick less. For babies and small children, living smoke-free means they're less likely to have ear infections, pneumonia, and bronchitis. · If you're a woman who is or will be pregnant someday, quitting smoking means a healthier . · Children who are close to you are less likely to become adult smokers. Where can you learn more? Go to https://Matcha.PhoRent. org and sign in to your Monitor110 account. Enter [id number] in the [facility] box to learn more about \"Learning About Benefits From Quitting Smoking. \"     If you do not have an account, please click on the \"Sign Up Now\" link. Current as of: 2021               Content Version: 13.0  © 2459-7274 Healthwise, Incorporated. Care instructions adapted under license by [facility] ([facility]). If you have questions about a medical condition or this instruction, always ask your healthcare professional. [place] [address] any warranty or liability for your use of this information.            Deciding About Using Medicines To Quit Smoking  How can you decide about using medicines to quit smoking? What are the medicines you can use? Your doctor may prescribe varenicline (Chantix) or bupropion (Zyban). These medicines can help you cope with cravings for tobacco. They are pills that don't contain nicotine. You also can use nicotine replacement products. These do contain nicotine. There are many types. · Gum and lozenges slowly release nicotine into your mouth. · Patches stick to your skin. They slowly release nicotine into your bloodstream.  · An inhaler has a da silva that contains nicotine. You breathe in a puff of nicotine vapor through your mouth and throat. · Nasal spray releases a mist that contains nicotine. What are key points about this decision? · Using medicines can double your chances of quitting smoking. They can ease cravings and withdrawal symptoms. · Getting counseling along with using medicine can raise your chances of quitting even more. · If you smoke fewer than 5 cigarettes a day, you may not need medicines to help you quit smoking. · These medicines have less nicotine than cigarettes. And by itself, nicotine is not nearly as harmful as smoking. The tars, carbon monoxide, and other toxic chemicals in tobacco cause the harmful effects. · The side effects of nicotine replacement products depend on the type of product. For example, a patch can make your skin red and itchy. Medicines in pill form can make you sick to your stomach. They can also cause dry mouth and trouble sleeping. For most people, the side effects are not bad enough to make them stop using the products. Why might you choose to use medicines to quit smoking? · You have tried on your own to stop smoking, but you were not able to stop. · You smoke more than 5 cigarettes a day. · You want to increase your chances of quitting smoking. · You want to reduce your cravings and withdrawal symptoms.   · You feel the benefits of medicine outweigh the side effects. Why might you choose not to use medicine? · You want to try quitting on your own by stopping all at once (\"cold turkey\"). · You want to cut back slowly on the number of cigarettes you smoke. · You smoke fewer than 5 cigarettes a day. · You do not like using medicine. · You feel the side effects of medicines outweigh the benefits. · You are worried about the cost of medicines. Your decision  Thinking about the facts and your feelings can help you make a decision that is right for you. Be sure you understand the benefits and risks of your options, and think about what else you need to do before you make the decision. Where can you learn more? Go to https://OZ SafeRoomspeE la Carte.PublicRelay. org and sign in to your Oppten account. Enter W840 in the [employer] box to learn more about \"Deciding About Using Medicines To Quit Smoking. \"     If you do not have an account, please click on the \"Sign Up Now\" link. Current as of: February 11, 2021               Content Version: 13.0  © 2623-3874 Healthwise, HeiaHeia.com. Care instructions adapted under license by [facility] ([facility]). If you have questions about a medical condition or this instruction, always ask your healthcare professional. [place] [address] any warranty or liability for your use of this information. Personalized Preventive Plan for Corky Aschoff - 12/1/2021  Medicare offers a range of preventive health benefits. Some of the tests and screenings are paid in full while other may be subject to a deductible, co-insurance, and/or copay. Some of these benefits include a comprehensive review of your medical history including lifestyle, illnesses that may run in your family, and various assessments and screenings as appropriate. After reviewing your medical record and screening and assessments performed today your provider may have ordered immunizations, labs, imaging, and/or referrals for you.   A list of these

## 2021-12-15 DIAGNOSIS — I10 ESSENTIAL HYPERTENSION: ICD-10-CM

## 2021-12-15 DIAGNOSIS — E78.2 MIXED HYPERLIPIDEMIA: ICD-10-CM

## 2021-12-15 DIAGNOSIS — E55.9 VITAMIN D DEFICIENCY: ICD-10-CM

## 2021-12-15 DIAGNOSIS — N18.2 STAGE 2 CHRONIC KIDNEY DISEASE: ICD-10-CM

## 2022-01-18 DIAGNOSIS — I25.10 CORONARY ARTERY DISEASE INVOLVING NATIVE CORONARY ARTERY OF NATIVE HEART WITHOUT ANGINA PECTORIS: ICD-10-CM

## 2022-01-19 RX ORDER — ATORVASTATIN CALCIUM 80 MG/1
80 TABLET, FILM COATED ORAL NIGHTLY
Qty: 90 TABLET | Refills: 0 | Status: SHIPPED
Start: 2022-01-19 | End: 2022-04-06 | Stop reason: SDUPTHER

## 2022-04-06 ENCOUNTER — OFFICE VISIT (OUTPATIENT)
Dept: PRIMARY CARE CLINIC | Age: 72
End: 2022-04-06
Payer: COMMERCIAL

## 2022-04-06 VITALS
SYSTOLIC BLOOD PRESSURE: 122 MMHG | TEMPERATURE: 97.2 F | BODY MASS INDEX: 25.39 KG/M2 | HEART RATE: 72 BPM | WEIGHT: 158 LBS | OXYGEN SATURATION: 99 % | DIASTOLIC BLOOD PRESSURE: 74 MMHG | HEIGHT: 66 IN

## 2022-04-06 DIAGNOSIS — F17.200 TOBACCO DEPENDENCE: ICD-10-CM

## 2022-04-06 DIAGNOSIS — E55.9 VITAMIN D DEFICIENCY: ICD-10-CM

## 2022-04-06 DIAGNOSIS — I35.0 NONRHEUMATIC AORTIC VALVE STENOSIS: ICD-10-CM

## 2022-04-06 DIAGNOSIS — I10 ESSENTIAL HYPERTENSION: ICD-10-CM

## 2022-04-06 DIAGNOSIS — J43.1 PANLOBULAR EMPHYSEMA (HCC): ICD-10-CM

## 2022-04-06 DIAGNOSIS — N18.2 STAGE 2 CHRONIC KIDNEY DISEASE: ICD-10-CM

## 2022-04-06 DIAGNOSIS — I25.10 CORONARY ARTERY DISEASE INVOLVING NATIVE CORONARY ARTERY OF NATIVE HEART WITHOUT ANGINA PECTORIS: Primary | ICD-10-CM

## 2022-04-06 DIAGNOSIS — G45.1 HEMISPHERIC CAROTID ARTERY SYNDROME: ICD-10-CM

## 2022-04-06 DIAGNOSIS — E78.2 MIXED HYPERLIPIDEMIA: ICD-10-CM

## 2022-04-06 PROCEDURE — 99214 OFFICE O/P EST MOD 30 MIN: CPT | Performed by: FAMILY MEDICINE

## 2022-04-06 RX ORDER — AMLODIPINE BESYLATE 5 MG/1
5 TABLET ORAL DAILY
Qty: 90 TABLET | Refills: 2 | Status: SHIPPED | OUTPATIENT
Start: 2022-04-06 | End: 2023-01-01

## 2022-04-06 RX ORDER — ATORVASTATIN CALCIUM 80 MG/1
80 TABLET, FILM COATED ORAL NIGHTLY
Qty: 90 TABLET | Refills: 3 | Status: SHIPPED | OUTPATIENT
Start: 2022-04-06

## 2022-04-06 RX ORDER — METOPROLOL SUCCINATE 50 MG/1
50 TABLET, EXTENDED RELEASE ORAL 2 TIMES DAILY
Qty: 180 TABLET | Refills: 2 | Status: SHIPPED
Start: 2022-04-06 | End: 2022-05-30 | Stop reason: SDUPTHER

## 2022-04-06 ASSESSMENT — ENCOUNTER SYMPTOMS
CONSTIPATION: 0
RHINORRHEA: 0
SHORTNESS OF BREATH: 0
SORE THROAT: 0
VOMITING: 0
DIARRHEA: 0
ABDOMINAL PAIN: 0
WHEEZING: 0
NAUSEA: 0

## 2022-04-06 NOTE — PROGRESS NOTES
2022     Chief Complaint   Patient presents with    Hyperlipidemia     HPI  Delta Huang (:  1950) is a 66 y.o. male, here for evaluation of the following medical concerns:    Pt is a 69-year-old male with a past medical history of hypertension, hyperlipidemia, history of coronary artery disease, carotid artery disease and left sided CEA, COPD, current smoker, CKD stage II, nonrheumatic aortic valve stenosis.     Patient presents today for a routine follow-up.       Recently patient underwent elective cardiac catheterization in preparation of AVR and was admitted post procedure.  Patient had a 95% stenosis of his ostial RCA.  A single drug-eluting stent was placed.  Plans for TAVR in the near future.  Patient is recommended to be on dual antiplatelet therapy for at least 6 months after the placement of CASSIE. Prior to having AVR patient will need to have all his teeth removed. Patient is reluctant to do such. Thus has not followed up with cardiology.     HTN: Stable, below 150/90 which is his goal. Patient is currently on amlodipine 10 mg daily, ramipril 10 mg daily, Metoprolol ER 50 mg twice daily.     HLD: Atorvastatin 40mg daily due to the SE of fatigue on higher dose.      CAD: No recent acute exacerbations. Patient is currently on blood pressure control with amlodipine 10 mg daily. ACE inhibitor: Ramipril 10 mg daily, beta blocker: metoprolol extended release 50 mg twice daily. Patient is also on aspirin 325 mg daily and plavix 75mg daily. Statin, atorvastatin 40 mg daily. Patient following with cardiology.      Carotid Artery Disease w/ TIA:  Fairly stable carotid artery stenosis and left CEA.    Left sided CEA: Following w/ vascular surgery as noted above.       COPD:  Positive PFT showing moderate airflow obstruction and COPD. Patient reports that he is largely asymptomatic unless he undergoes significant exertion in which he will feel out of breath after such.  Patient denies any chest pain, palpitations, significant shift lightheadedness or dizziness. Not currently on any controller medications.      Current Smoker: Patient is reluctant to discuss quitting.     CKD Stage II: Baseline creatinine of 1.2-1.4. Review of Systems   Constitutional: Negative for chills and fever. HENT: Negative for congestion, rhinorrhea and sore throat. Respiratory: Negative for shortness of breath and wheezing. Cardiovascular: Negative for chest pain and leg swelling. Gastrointestinal: Negative for abdominal pain, constipation, diarrhea, nausea and vomiting. Skin: Negative for rash. Neurological: Negative for light-headedness and headaches. Past Medical History:   Diagnosis Date    Aortic stenosis, severe 7/6/2018    Aortic valve disorder     Arthritis     CAD (coronary artery disease)     Chronic kidney disease     stage 2    COPD [facility]ch[facility] obstructive [facility] disease[facility])     Folic acid deficiency     GERD (gastroesophageal reflux disease)     Hyperlipidemia     Hypertension     Symptomatic stenosis of left carotid artery without infarction 07/05/2018    Tobacco dependence     Transient cerebral ischemia     Vitamin B 12 deficiency        Prior to Visit Medications    Medication Sig Taking?  Authorizing Provider   atorvastatin (LIPITOR) 80 MG tablet Take 1 tablet by mouth nightly Yes Dinh Gong MD   amLODIPine (NORVASC) 5 MG tablet Take 1 tablet by mouth daily Yes Dinh Gong MD   metoprolol succinate (TOPROL XL) 50 MG extended release tablet Take 1 tablet by mouth 2 times daily Yes Dinh Gong MD   clopidogrel (PLAVIX) 75 MG tablet Take 1 tablet by mouth daily Yes Dinh Gong MD   ramipril (ALTACE) 10 MG capsule Take 1 capsule by mouth daily Yes Dinh Gong MD   vitamin D (ERGOCALCIFEROL) 1.25 MG (90619 UT) CAPS capsule Take 1 capsule by mouth once a week Yes Dinh Gong MD   magnesium (MAGNESIUM-OXIDE) 250 MG TABS tablet Take 250 mg by mouth daily Yes Historical Provider, MD   Cyanocobalamin (VITAMIN B12 PO) Take 2,500 mcg by mouth daily Yes Historical Provider, MD   aspirin 325 MG tablet Take 1 tablet by mouth daily Yes Saloni Jarquin MD        No Known Allergies    Social History     Tobacco Use    Smoking status: Current Every Day Smoker     Packs/day: 1.00     Years: 50.00     Pack years: 50.00     Types: Cigarettes     Start date: 1968    Smokeless tobacco: Never Used    Tobacco comment: Does not want to quit. Substance Use Topics    Alcohol use: No           Vitals:    04/06/22 0929   BP: 122/74   Pulse: 72   Temp: 97.2 °F (36.2 °C)   SpO2: 99%   Weight: 158 lb (71.7 kg)   Height: 5' 6\" (1.676 m)     Estimated body mass index is 25.5 kg/m² as calculated from the following:    Height as of this encounter: 5' 6\" (1.676 m). Weight as of this encounter: 158 lb (71.7 kg). Physical Exam  Constitutional:       Appearance: He is well-developed. HENT:      Head: Normocephalic. Eyes:      Extraocular Movements: Extraocular movements intact. Conjunctiva/sclera: Conjunctivae normal.   Cardiovascular:      Rate and Rhythm: Normal rate and regular rhythm. Heart sounds: Murmur heard. Pulmonary:      Effort: Pulmonary effort is normal.      Breath sounds: Normal breath sounds. No wheezing or rales. Abdominal:      General: Bowel sounds are normal.      Palpations: Abdomen is soft. Tenderness: There is no abdominal tenderness. Musculoskeletal:      Right lower leg: No edema. Left lower leg: No edema. Neurological:      General: No focal deficit present. Mental Status: He is alert. Comments: Cranial nerves grossly intact   Psychiatric:         Mood and Affect: Mood normal.         Judgment: Judgment normal.         ASSESSMENT/PLAN:  Devin Pedroza was seen today for hyperlipidemia.     Diagnoses and all orders for this visit:    Coronary artery disease involving native coronary artery of native heart without angina pectoris  -     HIGH SENSITIVITY CRP; Future  -     atorvastatin (LIPITOR) 80 MG tablet; Take 1 tablet by mouth nightly  -     amLODIPine (NORVASC) 5 MG tablet; Take 1 tablet by mouth daily  -     metoprolol succinate (TOPROL XL) 50 MG extended release tablet; Take 1 tablet by mouth 2 times daily  Status post drug-eluting stent. Patient will need to follow-up with cardiology to determine the duration of his dual antiplatelet therapy. Per last note he is to be on this for at least 6 months. Patient has not followed up with cardiology. Patient to schedule appointment. Patient is on appropriate therapy. Essential hypertension  -     CBC with Auto Differential; Future  -     Comprehensive Metabolic Panel; Future  Blood pressure stable well controlled today. Patient is tolerating blood pressure medications well without issue. Refills as noted above. Stage 2 chronic kidney disease  -     Comprehensive Metabolic Panel; Future  -     Microalbumin / Creatinine Urine Ratio; Future    Mixed hyperlipidemia  -     Lipid Panel; Future  Patient on statin medication. Reports he is tolerating this well. No side effects. Vitamin D deficiency  -     Vitamin D 25 Hydroxy; Future  Continue vitamin D supplementation, high dose. Panlobular emphysema ([address]  Patient at times does report some mild shortness of breath. Did offer patient inhaler which she declined today. Continue to follow. Tobacco dependence  Per contemplative. Not interested in quitting. Nonrheumatic aortic valve stenosis  Plans for possible AVR in the future. However patient will need to have his teeth extracted. Patient is reluctant to do such. Patient will need to follow-up with cardiology in regards to this issue. Hemispheric carotid artery syndrome  Patient to follow-up with vascular surgery annually. Patient complete labs today. Will call with results. Return in about 4 months (around 8/6/2022).     An [employer] was used to authenticate this note.     --Nereida Boggs MD on 4/6/22 at 9:05 AM EDT

## 2022-04-08 LAB
ALBUMIN SERPL-MCNC: 4.2 G/DL
ALP BLD-CCNC: 96 U/L
ALT SERPL-CCNC: 10 U/L
ANION GAP SERPL CALCULATED.3IONS-SCNC: ABNORMAL MMOL/L
AST SERPL-CCNC: 14 U/L
BASOPHILS ABSOLUTE: 49 /ΜL
BASOPHILS RELATIVE PERCENT: 0.7 %
BILIRUB SERPL-MCNC: 0.6 MG/DL (ref 0.1–1.4)
BUN BLDV-MCNC: 25 MG/DL
CALCIUM SERPL-MCNC: 9.3 MG/DL
CHLORIDE BLD-SCNC: 103 MMOL/L
CHOLESTEROL, TOTAL: 119 MG/DL
CHOLESTEROL/HDL RATIO: 2.3
CO2: 25 MMOL/L
CREAT SERPL-MCNC: 1.29 MG/DL
CREATININE, URINE: 93
EOSINOPHILS ABSOLUTE: 70 /ΜL
EOSINOPHILS RELATIVE PERCENT: 1 %
GFR CALCULATED: 55
GLUCOSE BLD-MCNC: 112 MG/DL
HCT VFR BLD CALC: 41.4 % (ref 41–53)
HDLC SERPL-MCNC: 52 MG/DL (ref 35–70)
HEMOGLOBIN: 13.9 G/DL (ref 13.5–17.5)
LDL CHOLESTEROL CALCULATED: 52 MG/DL (ref 0–160)
LYMPHOCYTES ABSOLUTE: 1449 /ΜL
LYMPHOCYTES RELATIVE PERCENT: 20.7 %
MCH RBC QN AUTO: 29.4 PG
MCHC RBC AUTO-ENTMCNC: 33.6 G/DL
MCV RBC AUTO: 87.7 FL
MICROALBUMIN/CREAT 24H UR: 0.7 MG/G{CREAT}
MICROALBUMIN/CREAT UR-RTO: 8
MONOCYTES ABSOLUTE: 518 /ΜL
MONOCYTES RELATIVE PERCENT: 7.4 %
NEUTROPHILS ABSOLUTE: 4914 /ΜL
NEUTROPHILS RELATIVE PERCENT: 70.2 %
NONHDLC SERPL-MCNC: 67 MG/DL
PDW BLD-RTO: 12.7 %
PLATELET # BLD: 151 K/ΜL
PMV BLD AUTO: 11.9 FL
POTASSIUM SERPL-SCNC: 4.7 MMOL/L
RBC # BLD: 4.72 10^6/ΜL
SODIUM BLD-SCNC: 138 MMOL/L
TOTAL PROTEIN: 6.7
TRIGL SERPL-MCNC: 73 MG/DL
VITAMIN D 25-HYDROXY: 91
VITAMIN D2, 25 HYDROXY: NORMAL
VITAMIN D3,25 HYDROXY: NORMAL
VLDLC SERPL CALC-MCNC: NORMAL MG/DL
WBC # BLD: 7 10^3/ML

## 2022-04-11 DIAGNOSIS — E78.2 MIXED HYPERLIPIDEMIA: ICD-10-CM

## 2022-04-11 DIAGNOSIS — I10 ESSENTIAL HYPERTENSION: ICD-10-CM

## 2022-04-11 DIAGNOSIS — N18.2 STAGE 2 CHRONIC KIDNEY DISEASE: ICD-10-CM

## 2022-04-11 DIAGNOSIS — E55.9 VITAMIN D DEFICIENCY: ICD-10-CM

## 2022-04-11 DIAGNOSIS — I25.10 CORONARY ARTERY DISEASE INVOLVING NATIVE CORONARY ARTERY OF NATIVE HEART WITHOUT ANGINA PECTORIS: ICD-10-CM

## 2022-05-30 DIAGNOSIS — I25.10 CORONARY ARTERY DISEASE INVOLVING NATIVE CORONARY ARTERY OF NATIVE HEART WITHOUT ANGINA PECTORIS: ICD-10-CM

## 2022-05-31 RX ORDER — METOPROLOL SUCCINATE 50 MG/1
50 TABLET, EXTENDED RELEASE ORAL 2 TIMES DAILY
Qty: 180 TABLET | Refills: 2 | Status: SHIPPED | OUTPATIENT
Start: 2022-05-31 | End: 2023-02-25

## 2022-07-05 DIAGNOSIS — I10 ESSENTIAL HYPERTENSION: ICD-10-CM

## 2022-07-05 RX ORDER — RAMIPRIL 10 MG/1
10 CAPSULE ORAL DAILY
Qty: 90 CAPSULE | Refills: 1 | Status: SHIPPED | OUTPATIENT
Start: 2022-07-05

## 2022-08-10 ENCOUNTER — OFFICE VISIT (OUTPATIENT)
Dept: PRIMARY CARE CLINIC | Age: 72
End: 2022-08-10
Payer: COMMERCIAL

## 2022-08-10 VITALS
HEIGHT: 66 IN | HEART RATE: 73 BPM | SYSTOLIC BLOOD PRESSURE: 150 MMHG | WEIGHT: 152 LBS | DIASTOLIC BLOOD PRESSURE: 86 MMHG | RESPIRATION RATE: 20 BRPM | OXYGEN SATURATION: 100 % | TEMPERATURE: 98.3 F | BODY MASS INDEX: 24.43 KG/M2

## 2022-08-10 DIAGNOSIS — G45.1 HEMISPHERIC CAROTID ARTERY SYNDROME: ICD-10-CM

## 2022-08-10 DIAGNOSIS — N18.2 STAGE 2 CHRONIC KIDNEY DISEASE: ICD-10-CM

## 2022-08-10 DIAGNOSIS — F17.200 TOBACCO DEPENDENCE: ICD-10-CM

## 2022-08-10 DIAGNOSIS — E78.2 MIXED HYPERLIPIDEMIA: ICD-10-CM

## 2022-08-10 DIAGNOSIS — E55.9 VITAMIN D DEFICIENCY: ICD-10-CM

## 2022-08-10 DIAGNOSIS — I10 ESSENTIAL HYPERTENSION: Primary | ICD-10-CM

## 2022-08-10 DIAGNOSIS — J43.1 PANLOBULAR EMPHYSEMA (HCC): ICD-10-CM

## 2022-08-10 DIAGNOSIS — I35.0 NONRHEUMATIC AORTIC VALVE STENOSIS: ICD-10-CM

## 2022-08-10 DIAGNOSIS — I25.10 CORONARY ARTERY DISEASE INVOLVING NATIVE CORONARY ARTERY OF NATIVE HEART WITHOUT ANGINA PECTORIS: ICD-10-CM

## 2022-08-10 PROCEDURE — 1123F ACP DISCUSS/DSCN MKR DOCD: CPT | Performed by: FAMILY MEDICINE

## 2022-08-10 PROCEDURE — 99214 OFFICE O/P EST MOD 30 MIN: CPT | Performed by: FAMILY MEDICINE

## 2022-08-10 SDOH — ECONOMIC STABILITY: FOOD INSECURITY: WITHIN THE PAST 12 MONTHS, THE FOOD YOU BOUGHT JUST DIDN'T LAST AND YOU DIDN'T HAVE MONEY TO GET MORE.: NEVER TRUE

## 2022-08-10 SDOH — ECONOMIC STABILITY: FOOD INSECURITY: WITHIN THE PAST 12 MONTHS, YOU WORRIED THAT YOUR FOOD WOULD RUN OUT BEFORE YOU GOT MONEY TO BUY MORE.: NEVER TRUE

## 2022-08-10 ASSESSMENT — ENCOUNTER SYMPTOMS
RHINORRHEA: 0
NAUSEA: 0
WHEEZING: 0
CONSTIPATION: 0
DIARRHEA: 0
SORE THROAT: 0
ABDOMINAL PAIN: 0
SHORTNESS OF BREATH: 0
VOMITING: 0

## 2022-08-10 ASSESSMENT — SOCIAL DETERMINANTS OF HEALTH (SDOH): HOW HARD IS IT FOR YOU TO PAY FOR THE VERY BASICS LIKE FOOD, HOUSING, MEDICAL CARE, AND HEATING?: NOT HARD AT ALL

## 2022-08-10 ASSESSMENT — PATIENT HEALTH QUESTIONNAIRE - PHQ9
SUM OF ALL RESPONSES TO PHQ QUESTIONS 1-9: 0
1. LITTLE INTEREST OR PLEASURE IN DOING THINGS: 0
2. FEELING DOWN, DEPRESSED OR HOPELESS: 0
SUM OF ALL RESPONSES TO PHQ QUESTIONS 1-9: 0
SUM OF ALL RESPONSES TO PHQ QUESTIONS 1-9: 0
SUM OF ALL RESPONSES TO PHQ9 QUESTIONS 1 & 2: 0
SUM OF ALL RESPONSES TO PHQ QUESTIONS 1-9: 0

## 2022-08-10 NOTE — PROGRESS NOTES
8/10/2022   Chief Complaint   Patient presents with    Cholesterol Problem     HPI  Soo Levy (:  1950) is a 67 y.o. male, here for evaluation of the following medical concerns:    Pt is a 69-year-old male with a past medical history of hypertension, hyperlipidemia, history of coronary artery disease, carotid artery disease and left sided CEA, COPD, current smoker, CKD stage II, nonrheumatic aortic valve stenosis. Patient presents today for a routine follow-up. Recently patient underwent elective cardiac catheterization in preparation of AVR and was admitted post procedure. Patient had a 95% stenosis of his ostial RCA. A single drug-eluting stent was placed. Plans for TAVR in the near future. Patient is recommended to be on dual antiplatelet therapy for at least 6 months after the placement of CASSIE, completed, only on ASA. Prior to having AVR patient will need to have all his teeth removed. Patient is reluctant to do such. Thus has not followed up with cardiology. HTN: Stable, below 150/90 which is his goal. Patient is currently on amlodipine 10 mg daily, ramipril 10 mg daily, Metoprolol ER 50 mg twice daily. HLD: Atorvastatin 80mg daily. CAD: No recent acute exacerbations. Patient is currently on blood pressure control with amlodipine 10 mg daily. ACE inhibitor: Ramipril 10 mg daily, beta blocker: metoprolol extended release 50 mg twice daily. Patient is also on aspirin 325 mg daily. Statin, atorvastatin 80 mg daily. Patient following with cardiology. Carotid Artery Disease w/ TIA:  Fairly stable carotid artery stenosis s/p left CEA. Left sided CEA: Following w/ vascular surgery as noted above. COPD:  Positive PFT showing moderate airflow obstruction and COPD. Patient reports that he is largely asymptomatic unless he undergoes significant exertion in which he will feel out of breath after such.  Patient denies any chest pain, palpitations, significant shift lightheadedness or dizziness. Not currently on any controller medications. Current Smoker: Patient is reluctant to discuss quitting. CKD Stage II: Baseline creatinine of 1.2-1.4. Review of Systems   Constitutional:  Negative for chills and fever. HENT:  Negative for congestion, rhinorrhea and sore throat. Respiratory:  Negative for shortness of breath and wheezing. Cardiovascular:  Negative for chest pain and leg swelling. Gastrointestinal:  Negative for abdominal pain, constipation, diarrhea, nausea and vomiting. Skin:  Negative for rash. Neurological:  Negative for light-headedness and headaches. Past Medical History:   Diagnosis Date    Aortic stenosis, severe 7/6/2018    Aortic valve disorder     Arthritis     CAD (coronary artery disease)     Chronic kidney disease     stage 2    COPD (ch[facility] obstructive pulmonary disease) ([facility])     Folic acid deficiency     GERD (gastroesophageal reflux disease)     Hyperlipidemia     Hypertension     Symptomatic stenosis of left carotid artery without infarction 07/05/2018    Tobacco dependence     Transient cerebral ischemia     Vitamin B 12 deficiency        Prior to Visit Medications    Medication Sig Taking?  Authorizing Provider   ramipril (ALTACE) 10 MG capsule Take 1 capsule by mouth daily Yes Shabnam Haney MD   metoprolol succinate (TOPROL XL) 50 MG extended release tablet Take 1 tablet by mouth 2 times daily Yes Shabnam Haney MD   atorvastatin (LIPITOR) 80 MG tablet Take 1 tablet by mouth nightly Yes Shabnam Haney MD   amLODIPine (NORVASC) 5 MG tablet Take 1 tablet by mouth daily Yes Shabnam Haney MD   vitamin D (ERGOCALCIFEROL) 1.25 MG (07395 UT) CAPS capsule Take 1 capsule by mouth once a week Yes Shabnam Haney MD   magnesium (MAGNESIUM-OXIDE) 250 MG TABS tablet Take 250 mg by mouth daily Yes Historical Provider, MD   Cyanocobalamin (VITAMIN B12 PO) Take 2,500 mcg by mouth daily Yes Historical Provider, MD

## 2022-12-14 ENCOUNTER — OFFICE VISIT (OUTPATIENT)
Dept: PRIMARY CARE CLINIC | Age: 72
End: 2022-12-14
Payer: COMMERCIAL

## 2022-12-14 VITALS
OXYGEN SATURATION: 99 % | RESPIRATION RATE: 24 BRPM | HEART RATE: 56 BPM | WEIGHT: 153 LBS | DIASTOLIC BLOOD PRESSURE: 76 MMHG | BODY MASS INDEX: 24.59 KG/M2 | TEMPERATURE: 98.3 F | SYSTOLIC BLOOD PRESSURE: 116 MMHG | HEIGHT: 66 IN

## 2022-12-14 DIAGNOSIS — Z00.00 MEDICARE ANNUAL WELLNESS VISIT, SUBSEQUENT: Primary | ICD-10-CM

## 2022-12-14 DIAGNOSIS — J43.1 PANLOBULAR EMPHYSEMA (HCC): ICD-10-CM

## 2022-12-14 DIAGNOSIS — E78.2 MIXED HYPERLIPIDEMIA: ICD-10-CM

## 2022-12-14 DIAGNOSIS — I35.0 NONRHEUMATIC AORTIC VALVE STENOSIS: ICD-10-CM

## 2022-12-14 DIAGNOSIS — Z12.11 COLON CANCER SCREENING: ICD-10-CM

## 2022-12-14 DIAGNOSIS — E55.9 VITAMIN D DEFICIENCY: ICD-10-CM

## 2022-12-14 DIAGNOSIS — I10 ESSENTIAL HYPERTENSION: ICD-10-CM

## 2022-12-14 DIAGNOSIS — Z71.89 ACP (ADVANCE CARE PLANNING): ICD-10-CM

## 2022-12-14 DIAGNOSIS — F17.200 TOBACCO DEPENDENCE: ICD-10-CM

## 2022-12-14 DIAGNOSIS — I25.10 CORONARY ARTERY DISEASE INVOLVING NATIVE CORONARY ARTERY OF NATIVE HEART WITHOUT ANGINA PECTORIS: ICD-10-CM

## 2022-12-14 DIAGNOSIS — N18.2 STAGE 2 CHRONIC KIDNEY DISEASE: ICD-10-CM

## 2022-12-14 PROCEDURE — 3074F SYST BP LT 130 MM HG: CPT | Performed by: FAMILY MEDICINE

## 2022-12-14 PROCEDURE — G0439 PPPS, SUBSEQ VISIT: HCPCS | Performed by: FAMILY MEDICINE

## 2022-12-14 PROCEDURE — 99497 ADVNCD CARE PLAN 30 MIN: CPT | Performed by: FAMILY MEDICINE

## 2022-12-14 PROCEDURE — 1123F ACP DISCUSS/DSCN MKR DOCD: CPT | Performed by: FAMILY MEDICINE

## 2022-12-14 PROCEDURE — 3078F DIAST BP <80 MM HG: CPT | Performed by: FAMILY MEDICINE

## 2022-12-14 RX ORDER — RAMIPRIL 10 MG/1
10 CAPSULE ORAL DAILY
Qty: 90 CAPSULE | Refills: 3 | Status: SHIPPED | OUTPATIENT
Start: 2022-12-14

## 2022-12-14 RX ORDER — ERGOCALCIFEROL 1.25 MG/1
50000 CAPSULE ORAL WEEKLY
Qty: 12 CAPSULE | Refills: 3 | Status: SHIPPED | OUTPATIENT
Start: 2022-12-14

## 2022-12-14 ASSESSMENT — LIFESTYLE VARIABLES
HOW OFTEN DO YOU HAVE A DRINK CONTAINING ALCOHOL: NEVER
HOW MANY STANDARD DRINKS CONTAINING ALCOHOL DO YOU HAVE ON A TYPICAL DAY: PATIENT DOES NOT DRINK

## 2022-12-14 ASSESSMENT — PATIENT HEALTH QUESTIONNAIRE - PHQ9
SUM OF ALL RESPONSES TO PHQ QUESTIONS 1-9: 0
SUM OF ALL RESPONSES TO PHQ9 QUESTIONS 1 & 2: 0
SUM OF ALL RESPONSES TO PHQ QUESTIONS 1-9: 0
2. FEELING DOWN, DEPRESSED OR HOPELESS: 0
1. LITTLE INTEREST OR PLEASURE IN DOING THINGS: 0

## 2022-12-14 NOTE — PATIENT INSTRUCTIONS
Learning About Being Active as an Older Adult  Why is being active important as you get older? Being active is one of the best things you can do for your health. And it's never too late to start. Being active--or getting active, if you aren't already--has definite benefits. It can:  Give you more energy,  Keep your mind sharp. Improve balance to reduce your risk of falls. Help you manage chronic illness with fewer medicines. No matter how old you are, how fit you are, or what health problems you have, there is a form of activity that will work for you. And the more physical activity you can do, the better your overall health will be. What kinds of activity can help you stay healthy? Being more active will make your daily activities easier. Physical activity includes planned exercise and things you do in daily life. There are four types of activity:  Aerobic. Doing aerobic activity makes your heart and lungs strong. Includes walking, dancing, and gardening. Aim for at least 2½ hours spread throughout the week. It improves your energy and can help you sleep better. Muscle-strengthening. This type of activity can help maintain muscle and strengthen bones. Includes climbing stairs, using resistance bands, and lifting or carrying heavy loads. Aim for at least twice a week. It can help protect the knees and other joints. Stretching. Stretching gives you better range of motion in joints and muscles. Includes upper arm stretches, calf stretches, and gentle yoga. Aim for at least twice a week, preferably after your muscles are warmed up from other activities. It can help you function better in daily life. Balancing. This helps you stay coordinated and have good posture. Includes heel-to-toe walking, teresa chi, and certain types of yoga. Aim for at least 3 days a week. It can reduce your risk of falling.   Even if you have a hard time meeting the recommendations, it's better to be more active than less active. All activity done in each category counts toward your weekly total. You'd be surprised how daily things like carrying groceries, keeping up with grandchildren, and taking the stairs can add up. What keeps you from being active? If you've had a hard time being more active, you're not alone. Maybe you remember being able to do more. Or maybe you've never thought of yourself as being active. It's frustrating when you can't do the things you want. Being more active can help. What's holding you back? Getting started. Have a goal, but break it into easy tasks. Small steps build into big accomplishments. Staying motivated. If you feel like skipping your activity, remember your goal. Maybe you want to move better and stay independent. Every activity gets you one step closer. Not feeling your best.  Start with 5 minutes of an activity you enjoy. Prove to yourself you can do it. As you get comfortable, increase your time. You may not be where you want to be. But you're in the process of getting there. Everyone starts somewhere. How can you find safe ways to stay active? Talk with your doctor about any physical challenges you're facing. Make a plan with your doctor if you have a health problem or aren't sure how to get started with activity. If you're already active, ask your doctor if there is anything you should change to stay safe as your body and health change. If you tend to feel dizzy after you take medicine, avoid activity at that time. Try being active before you take your medicine. This will reduce your risk of falls. If you plan to be active at home, make sure to clear your space before you get started. Remove things like TV cords, coffee tables, and throw rugs. It's safest to have plenty of space to move freely. The key to getting more active is to take it slow and steady. Try to improve only a little bit at a time.  Pick just one area to improve on at first. And if an activity hurts, stop and talk to your doctor. Where can you learn more? Go to http://www.iyer.com/ and enter P600 to learn more about \"Learning About Being Active as an Older Adult. \"  Current as of: October 10, 2022               Content Version: 13.5  © 3364-0192 Healthwise, Incorporated. Care instructions adapted under license by [facility] ([facility]). If you have questions about a medical condition or this instruction, always ask your healthcare professional. [place] [address] any warranty or liability for your use of this information. Advance Directives: Care Instructions  Overview  An advance directive is a legal way to state your wishes at the end of your life. It tells your family and your doctor what to do if you can't say what you want. There are two main types of advance directives. You can change them any time your wishes change. Living will. This form tells your family and your doctor your wishes about life support and other treatment. The form is also called a declaration. Medical power of . This form lets you name a person to make treatment decisions for you when you can't speak for yourself. This person is called a health care agent (health care proxy, health care surrogate). The form is also called a durable power of  for health care. If you do not have an advance directive, decisions about your medical care may be made by a family member, or by a doctor or a  who doesn't know you. It may help to think of an advance directive as a gift to the people who care for you. If you have one, they won't have to make tough decisions by themselves. For more information, including forms for your state, see the [address] website (www.caringinfo.org/planning/advance-directives/). Follow-up care is a key part of your treatment and safety. Be sure to make and go to all appointments, and call your doctor if you are having problems.  It's also a good idea to know your test results and keep a list of the medicines you take. What should you include in an advance directive? Many states have a unique advance directive form. (It may ask you to address specific issues.) Or you might use a universal form that's approved by many states. If your form doesn't tell you what to address, it may be hard to know what to include in your advance directive. Use the questions below to help you get started. Who do you want to make decisions about your medical care if you are not able to? What life-support measures do you want if you have a serious illness that gets worse over time or can't be cured? What are you most afraid of that might happen? (Maybe you're afraid of having pain, losing your independence, or being kept alive by machines.)  Where would you prefer to die? (Your home? A hospital? A nursing home?)  Do you want to donate your organs when you die? Do you want certain Synagogue practices performed before you die? When should you call for help? Be sure to contact your doctor if you have any questions. Where can you learn more? Go to http://[place].iyer.com/ and enter R264 to learn more about \"Advance Directives: Care Instructions. \"  Current as of: June 16, 2022               Content Version: 13.5  © 7410-8183 Healthwise, Incorporated. Care instructions adapted under license by [facility] ([facility]). If you have questions about a medical condition or this instruction, always ask your healthcare professional. [place] [address] any warranty or liability for your use of this information. Learning About Living Tito Menendez  What is a living will? A living will, also called a declaration, is a legal form. It tells your family and your doctor your wishes when you can't speak for yourself. It's used by the health professionals who will treat you as you near the end of your life or if you get seriously hurt or ill.   If you put your wishes in writing, your loved ones and others will know what kind of care you want. They won't need to guess. This can ease your mind and be helpful to others. And you can change or cancel your living will at any time. A living will is not the same as an estate or property will. An estate will explains what you want to happen with your money and property after you die. How do you use it? Keep these facts in mind about how a living will is used. Your living will is used only if you can't speak or make decisions for yourself. Most often, one or more doctors must certify that you can't speak or decide for yourself before your living will takes effect. If you get better and can speak for yourself again, you can accept or refuse any treatment. It doesn't matter what you said in your living will. Some states may limit your right to refuse treatment in certain cases. For example, you may need to clearly state in your living will that you don't want artificial hydration and nutrition, such as being fed through a tube. Is a living will a legal document? A living will is a legal document. Each state has its own laws about living valencia. And a living will may be called something else in your state. Here are some things to know about living valencia. You don't need an  to complete a living will. But legal advice can be helpful if your state's laws are unclear. It can also help if your health history is complicated or your family can't agree on what should be in your living will. You can change your living will at any time. Some people find that their wishes about end-of-life care change as their health changes. If you make big changes to your living will, complete a new form. If you move to another state, make sure that your living will is legal in the state where you now live. In most cases, doctors will respect your wishes even if you have a form from a different state.   You might use a universal form that has been approved by many states. This kind of form can sometimes be filled out and stored online. Your digital copy will then be available wherever you have a connection to the internet. The doctors and nurses who need to treat you can find it right away. Your state may offer an online registry. This is another place where you can store your living will online. It's a good idea to get your living will notarized. This means using a person called a Co.Import to watch two people sign, or witness, your living will. What should you know when you create a living will? Here are some questions to ask yourself as you make your living will. Do you know enough about life support methods that might be used? If not, talk to your doctor so you know what might be done if you can't breathe on your own, your heart stops, or you can't swallow. What things would you still want to be able to do after you receive life-support methods? Would you want to be able to walk? To speak? To eat on your own? To live without the help of machines? Do you want certain Mandaeism practices performed if you become very ill? If you have a choice, where do you want to be cared for? In your home? At a hospital or nursing home? If you have a choice at the end of your life, where would you prefer to die? At home? In a hospital or nursing home? Somewhere else? Would you prefer to be buried or cremated? Do you want your organs to be donated after you die? What should you do with your living will? Make sure that your family members and your health care agent have copies of your living will (also called a declaration). Give your doctor a copy of your living will. Ask to have it kept as part of your medical record. If you have more than one doctor, make sure that each one has a copy. Put a copy of your living will where it can be easily found. For example, some people may put a copy on their refrigerator door.  If you are using a digital copy, be sure your doctor, family members, and health care agent know how to find and access it. Where can you learn more? Go to http://www.iyer.com/ and enter K356 to learn more about \"Learning About Living Tito Menendez. \"  Current as of: June 16, 2022               Content Version: 13.5  © 2503-0535 Healthwise, [employer]. Care instructions adapted under license by [facility]). If you have questions about a medical condition or this instruction, always ask your healthcare professional. [place] [address] any warranty or liability for your use of this information. Learning About Medical Power of   What is a medical power of ? A medical power of , also called a durable power of  for health care, is one type of the legal forms called advance directives. It lets you name the person you want to make treatment decisions for you if you can't speak or decide for yourself. The person you choose is called your health care agent. This person is also called a health care proxy or health care surrogate. A medical power of  may be called something else in your state. How do you choose a health care agent? Choose your health care agent carefully. This person may or may not be a family member. Talk to the person before you make your final decision. Make sure he or she is comfortable with this responsibility. It's a good idea to choose someone who:  Is at least 25years old. Knows you well and understands what makes life meaningful for you. Understands your Yarsani and moral values. Will do what you want, not what he or she wants. Will be able to make difficult choices at a stressful time. Will be able to refuse or stop treatment, if that is what you would want, even if you could die. Will be firm and confident with health professionals if needed. Will ask questions to get needed information. Lives near you or agrees to travel to you if needed.   Your family may help you make medical decisions while you can still be part of that process. But it's important to choose one person to be your health care agent in case you aren't able to make decisions for yourself. If you don't fill out the legal form and name a health care agent, the decisions your family can make may be limited. A health care agent may be called something else in your state. Who will make decisions for you if you don't have a health care agent? If you don't have a health care agent or a living will, you may not get the care you want. Decisions may be made by family members who disagree about your medical care. Or decisions may be made by a medical professional who doesn't know you well. In some cases, a  makes the decisions. When you name a health care agent, it is very clear who has the power to make health decisions for you. How do you name a health care agent? You name your health care agent on a legal form. This form is usually called a medical power of . Ask your hospital, state bar association, or office on aging where to find these forms. You must sign the form to make it legal. Some states require you to get the form notarized. This means that a person called a  watches you sign the form and then he or she signs the form. Some states also require that two or more witnesses sign the form. Be sure to tell your family members and doctors who your health care agent is. Where can you learn more? Go to http://www.woods.com/ and enter P737 to learn more about \"Learning About [address]. \"  Current as of: October 6, 2021               Content Version: 13.5  © 4159-7946 Healthwise, [employer]. Care instructions adapted under license by [facility] ([facility]).  If you have questions about a medical condition or this instruction, always ask your healthcare professional. [address] any warranty or liability for your use of this information. Deciding About Life-Prolonging Treatment  How can you decide about life-prolonging treatment? What is life-prolonging treatment? There are many kinds of treatment that can help you live longer. These may be needed for only a short time until your illness improves. Or you may use them over the long term to help keep you alive. Some treatments include the use of:  Medicines to slow the progress of certain diseases, such as heart disease, diabetes, cancer, AIDS, or Alzheimer's disease. Antibiotics to treat serious infections, such as pneumonia. Dialysis to clean your blood if your kidneys stop working. A breathing machine to help you breathe if you can't breathe on your own. This machine pumps air into your lungs through a tube put into your throat. A feeding tube or an intravenous (IV) line to give you food and fluids if you can't eat or drink. Cardiopulmonary resuscitation (CPR) to try to restart your heart. The decision to receive treatments that may help you live longer is a personal one. You may want your doctor to do everything possible to keep you alive, even when your chance for recovery is small. Or you may choose to only have care to manage your pain and other symptoms. What are key points about this decision? If there is a good chance that your illness can be cured or managed, your doctor may advise you to first try available treatments. If these don't work, then you might think about stopping treatment. If you stop treatment, you will still receive care that focuses on pain relief and comfort. A decision to stop treatment that keeps you alive does not have to be permanent. You can always change your mind if your health starts to improve. Even though treatment focuses on helping you live longer, it may cause side effects that can greatly affect your quality of life. And it could affect how you spend time with your family and friends.   If you still have personal goals that you want to pursue, you may want treatment that keeps you alive long enough to reach them. Why might you choose life-prolonging treatment? There is a good chance that your illness can be cured or managed. You think you can manage the possible side effects of treatment. You don't think treatment will get in the way of your quality of life. You have personal goals that you still want to pursue and achieve. Why might you choose to stop life-prolonging treatment? Your chance of surviving your illness is very low. You have tried all possible treatments for your illness, but they have not helped. You can no longer deal with the side effects of treatment. You have already met the goals you set out to achieve in your life. Your decision  Thinking about the facts and your feelings can help you make a decision that is right for you. Be sure you understand the benefits and risks of your options, and think about what else you need to do before you make the decision. Where can you learn more? Go to http://www.woods.com/ and enter Y854 to learn more about \"Deciding About Life-Prolonging Treatment. \"  Current as of: June 16, 2022               Content Version: 13.5  © 6287-3390 Healthwise, Incorporated. Care instructions adapted under license by [facility] ([facility]). If you have questions about a medical condition or this instruction, always ask your healthcare professional. [address] any warranty or liability for your use of this information. Personalized Preventive Plan for Violet Feliz - 12/14/2022  Medicare offers a range of preventive health benefits. Some of the tests and screenings are paid in full while other may be subject to a deductible, co-insurance, and/or copay. Some of these benefits include a comprehensive review of your medical history including lifestyle, illnesses that may run in your family, and various assessments and screenings as appropriate.     After reviewing your medical record and screening and assessments performed today your provider may have ordered immunizations, labs, imaging, and/or referrals for you. A list of these orders (if applicable) as well as your Preventive Care list are included within your After Visit Summary for your review. Other Preventive Recommendations:    A preventive eye exam performed by an eye specialist is recommended every 1-2 years to screen for glaucoma; cataracts, macular degeneration, and other eye disorders. A preventive dental visit is recommended every 6 months. Try to get at least 150 minutes of exercise per week or 10,000 steps per day on a pedometer . Order or download the FREE \"Exercise & Physical Activity: Your Everyday Guide\" from [employer]. Call 5-657.508.2345 or search [employer] online. You need 0321-0185 mg of calcium and 1050-2782 IU of vitamin D per day. It is possible to meet your calcium requirement with diet alone, but a vitamin D supplement is usually necessary to meet this goal.  When exposed to the sun, use a sunscreen that protects against both UVA and UVB radiation with an SPF of 30 or greater. Reapply every 2 to 3 hours or after sweating, drying off with a towel, or swimming. Always wear a seat belt when traveling in a car. Always wear a helmet when riding a bicycle or motorcycle.

## 2022-12-14 NOTE — PROGRESS NOTES
Medicare Annual Wellness Visit    Brad Dixon is here for Medicare AWV    Assessment & Plan   Medicare annual wellness visit, subsequent  Patient's chart reviewed of daily. Age-appropriate recommendations reviewed and advised. Essential hypertension  -     ramipril (ALTACE) 10 MG capsule; Take 1 capsule by mouth daily, Disp-90 capsule, R-3Normal  Blood pressure stable well controlled below his goal of 140/90. Tolerating current medications well without issue per report. Vitamin D deficiency  -     vitamin D (ERGOCALCIFEROL) 1.25 MG (40492 UT) CAPS capsule; Take 1 capsule by mouth once a week, Disp-12 capsule, R-3Normal  ACP (advance care planning)  -     [place] Advanced Care Planning (16-30 minutes) [74571]  Colon cancer screening  -     Fecal DNA [facility]  Coronary artery disease involving native coronary artery of native heart without angina pectoris  Patient is status post drug-eluting stent. Has completed 6 months duration of dual antiplatelet therapy. Patient has discontinued Plavix. Patient will need to follow-up with cardiology in regards to this issue. Otherwise patient on appropriate therapy. Patient denies any symptoms consistent with any recent ACS. As noted in HPI has been reluctant to follow-up with cardiology as he is not willing to get his teeth pulled prior to undergoing AVR. Stage 2 chronic kidney disease  Updated CMP due. Mixed hyperlipidemia  Most recent lipid panel appropriate. Patient tolerating statin medication well without issues. Panlobular emphysema ([address]  Tobacco dependence  Patient reports no symptoms from his emphysema. Patient is reluctant to quit smoking. Precontemplative. Tobacco cessation advised. Nonrheumatic aortic valve stenosis  As noted above. Patient is not interested in undergoing AVR as he is not interested in having all of his teeth extracted.      Recommendations for Preventive Services Due: see orders and patient instructions/AVS.  Recommended screening schedule for the next 5-10 years is provided to the patient in written form: see Patient Instructions/AVS.     Return in 4 months (on 4/14/2023) for Medicare Annual Wellness Visit in 1 year, Routine Follow-up of Chronic Conditions. Subjective   The following acute and/or chronic problems were also addressed today:    Pt is a 50-year-old male with a past medical history of hypertension, hyperlipidemia, history of coronary artery disease, carotid artery disease and left sided CEA, COPD, current smoker, CKD stage II, nonrheumatic aortic valve stenosis. Patient presents today for a routine follow-up. Recently patient underwent elective cardiac catheterization in preparation of AVR and was admitted post procedure. Patient had a 95% stenosis of his ostial RCA. A single drug-eluting stent was placed. Plans for TAVR in the near future. Patient is recommended to be on dual antiplatelet therapy for at least 6 months after the placement of CASSIE, completed, only on ASA. Prior to having AVR patient will need to have all his teeth removed. Patient is reluctant to do such. Thus has not followed up with cardiology. HTN: Stable, below 150/90 which is his goal. Patient is currently on amlodipine 10 mg daily, ramipril 10 mg daily, Metoprolol ER 50 mg twice daily. HLD: Atorvastatin 80mg daily. CAD: No recent acute exacerbations. Patient is currently on blood pressure control with amlodipine 10 mg daily. ACE inhibitor: Ramipril 10 mg daily, beta blocker: metoprolol extended release 50 mg twice daily. Patient is also on aspirin 325 mg daily. Statin, atorvastatin 80 mg daily. Patient following with cardiology. Carotid Artery Disease w/ TIA:  Fairly stable carotid artery stenosis s/p left CEA. No need to follow-up with vascular surgery. Left sided CEA: Following w/ vascular surgery as noted above.        COPD:  Positive PFT showing moderate airflow obstruction and COPD. Patient reports that he is largely asymptomatic unless he undergoes significant exertion in which he will feel out of breath after such. Patient denies any chest pain, palpitations, significant shift lightheadedness or dizziness. Not currently on any controller medications. Current Smoker: Patient is reluctant to discuss quitting. CKD Stage II: Baseline creatinine of 1.2-1.4. Pending updated blood work. Patient's complete Health Risk Assessment and screening values have been reviewed and are found in Flowsheets. The following problems were reviewed today and where indicated follow up appointments were made and/or referrals ordered. Positive Risk Factor Screenings with Interventions:                 Weight and Activity:  Physical Activity: Inactive    Days of Exercise per Week: 0 days    Minutes of Exercise per Session: 0 min     On average, how many days per week do you engage in moderate to strenuous exercise (like a brisk walk)?: 0 days  Have you lost any weight without trying in the past 3 months?: No  Body mass index: 24.69      Inactivity Interventions:  See AVS for additional education material  See A/P for plan and any pertinent orders           Advanced Directives:  Do you have a Living Will?: (!) No    Intervention:  has NO advanced directive - information provided    Advance Care Planning   Advanced Care Planning: Discussed the patients choices for care and treatment in case of a health event that adversely affects decision-making abilities. Also discussed the patients long-term treatment options. Reviewed with the patient the appropriate state-specific advance directive documents. Reviewed the process of designating a competent adult as an Agent (or -in-fact) that could take make health care decisions for the patient if incompetent.  Patient was asked to complete the declaration forms, if they have not already, either acknowledge the forms by a public batsheva or an eligible witness and provide a signed copy to the practice office. Time spent (minutes): 18       Tobacco Use:  Tobacco Use: High Risk    Smoking Tobacco Use: Every Day    Smokeless Tobacco Use: Never    Passive Exposure: Not on file     E-cigarette/Vaping       Questions Responses    E-cigarette/Vaping Use Never User    Start Date     Passive Exposure     Quit Date     Counseling Given     Comments           Interventions:  See AVS for addional education material  See A/P for plan and any pertinent orders            Objective   Vitals:    12/14/22 0923   BP: 116/76   Pulse: 56   Resp: 24   Temp: 98.3 °F (36.8 °C)   TempSrc: Temporal   SpO2: 99%   Weight: 153 lb (69.4 kg)   Height: 5' 6\" (1.676 m)      Body mass index is 24.69 kg/m².         General Appearance: alert and oriented to person, place and time, well developed and well- nourished, in no acute distress  Skin: warm and dry, no rash or erythema  Head: normocephalic and atraumatic  Eyes: pupils equal, round, and reactive to light, extraocular eye movements intact, conjunctivae normal  ENT: tympanic membrane, external ear and ear canal normal bilaterally, nose without deformity, nasal mucosa and turbinates normal without polyps  Neck: supple and non-tender without mass, no thyromegaly or thyroid nodules, no cervical lymphadenopathy  Pulmonary/Chest: clear to auscultation bilaterally- no wheezes, rales or rhonchi, normal air movement, no respiratory distress  Cardiovascular: normal rate, regular rhythm, normal S1 and S2, no murmurs, rubs, clicks, or gallops, distal pulses intact, no carotid bruits  Abdomen: soft, non-tender, non-distended, normal bowel sounds, no masses or organomegaly  Extremities: no cyanosis, clubbing or edema  Musculoskeletal: normal range of motion, no joint swelling, deformity or tenderness  Neurologic: reflexes normal and symmetric, no cranial nerve deficit, gait, coordination and speech normal       No Known Allergies  Prior to Visit Medications    Medication Sig Taking?  Authorizing Provider   ramipril (ALTACE) 10 MG capsule Take 1 capsule by mouth daily Yes Ana Carey MD   vitamin D (ERGOCALCIFEROL) 1.25 MG (44859 UT) CAPS capsule Take 1 capsule by mouth once a week Yes Ana Carey MD   metoprolol succinate (TOPROL XL) 50 MG extended release tablet Take 1 tablet by mouth 2 times daily Yes Ana Carey MD   atorvastatin (LIPITOR) 80 MG tablet Take 1 tablet by mouth nightly Yes Ana Carey MD   amLODIPine (NORVASC) 5 MG tablet Take 1 tablet by mouth daily Yes Ana Carey MD   magnesium (MAGNESIUM-OXIDE) 250 MG TABS tablet Take 250 mg by mouth daily Yes Historical Provider, MD   Cyanocobalamin (VITAMIN B12 PO) Take 2,500 mcg by mouth daily Yes Historical Provider, MD   aspirin 325 MG tablet Take 1 tablet by mouth daily Yes Ana Carey MD       [facility] (Including outside providers/suppliers regularly involved in providing care):   Patient Care Team:  Ana Carey MD as PCP - General (Family Medicine)  Ana Carey MD as PCP - [facility] Empaneled Provider     Reviewed and updated this visit:  Tobacco  Allergies  Meds  Problems  Med Hx  Surg Hx  Soc Hx  Fam Hx

## 2022-12-15 LAB
ALBUMIN SERPL-MCNC: NORMAL G/DL
ALP BLD-CCNC: NORMAL U/L
ALT SERPL-CCNC: 14 U/L
ANION GAP SERPL CALCULATED.3IONS-SCNC: NORMAL MMOL/L
AST SERPL-CCNC: 15 U/L
BASOPHILS ABSOLUTE: NORMAL
BASOPHILS RELATIVE PERCENT: NORMAL
BILIRUB SERPL-MCNC: NORMAL MG/DL
BUN BLDV-MCNC: 24 MG/DL
CALCIUM SERPL-MCNC: 9 MG/DL
CHLORIDE BLD-SCNC: NORMAL MMOL/L
CO2: NORMAL
CREAT SERPL-MCNC: 1.45 MG/DL
EOSINOPHILS ABSOLUTE: NORMAL
EOSINOPHILS RELATIVE PERCENT: NORMAL
GFR CALCULATED: NORMAL
GLUCOSE BLD-MCNC: 111 MG/DL
HCT VFR BLD CALC: 44.6 % (ref 41–53)
HEMOGLOBIN: 14.4 G/DL (ref 13.5–17.5)
LYMPHOCYTES ABSOLUTE: NORMAL
LYMPHOCYTES RELATIVE PERCENT: NORMAL
MCH RBC QN AUTO: NORMAL PG
MCHC RBC AUTO-ENTMCNC: NORMAL G/DL
MCV RBC AUTO: NORMAL FL
MONOCYTES ABSOLUTE: NORMAL
MONOCYTES RELATIVE PERCENT: NORMAL
NEUTROPHILS ABSOLUTE: NORMAL
NEUTROPHILS RELATIVE PERCENT: NORMAL
PDW BLD-RTO: NORMAL %
PLATELET # BLD: 138 K/ΜL
PMV BLD AUTO: NORMAL FL
POTASSIUM SERPL-SCNC: 5.2 MMOL/L
RBC # BLD: 4.92 10^6/ΜL
SODIUM BLD-SCNC: NORMAL MMOL/L
TOTAL PROTEIN: NORMAL
VITAMIN D 25-HYDROXY: 77
VITAMIN D2, 25 HYDROXY: NORMAL
VITAMIN D3,25 HYDROXY: NORMAL
WBC # BLD: 10.6 10^3/ML

## 2022-12-21 DIAGNOSIS — I25.10 CORONARY ARTERY DISEASE INVOLVING NATIVE CORONARY ARTERY OF NATIVE HEART WITHOUT ANGINA PECTORIS: ICD-10-CM

## 2022-12-21 DIAGNOSIS — E55.9 VITAMIN D DEFICIENCY: ICD-10-CM

## 2022-12-21 DIAGNOSIS — I10 ESSENTIAL HYPERTENSION: ICD-10-CM

## 2022-12-21 DIAGNOSIS — N18.2 STAGE 2 CHRONIC KIDNEY DISEASE: Primary | ICD-10-CM

## 2023-01-25 DIAGNOSIS — I25.10 CORONARY ARTERY DISEASE INVOLVING NATIVE CORONARY ARTERY OF NATIVE HEART WITHOUT ANGINA PECTORIS: ICD-10-CM

## 2023-01-25 RX ORDER — AMLODIPINE BESYLATE 5 MG/1
5 TABLET ORAL DAILY
Qty: 90 TABLET | Refills: 2 | Status: SHIPPED | OUTPATIENT
Start: 2023-01-25 | End: 2023-10-22

## 2023-03-02 DIAGNOSIS — I25.10 CORONARY ARTERY DISEASE INVOLVING NATIVE CORONARY ARTERY OF NATIVE HEART WITHOUT ANGINA PECTORIS: ICD-10-CM

## 2023-03-02 RX ORDER — METOPROLOL SUCCINATE 50 MG/1
50 TABLET, EXTENDED RELEASE ORAL 2 TIMES DAILY
Qty: 180 TABLET | Refills: 1 | Status: SHIPPED | OUTPATIENT
Start: 2023-03-02 | End: 2023-11-27

## 2023-04-18 DIAGNOSIS — I25.10 CORONARY ARTERY DISEASE INVOLVING NATIVE CORONARY ARTERY OF NATIVE HEART WITHOUT ANGINA PECTORIS: ICD-10-CM

## 2023-04-18 RX ORDER — ATORVASTATIN CALCIUM 80 MG/1
80 TABLET, FILM COATED ORAL NIGHTLY
Qty: 90 TABLET | Refills: 3 | Status: SHIPPED | OUTPATIENT
Start: 2023-04-18

## 2023-05-01 ENCOUNTER — OFFICE VISIT (OUTPATIENT)
Dept: FAMILY MEDICINE CLINIC | Age: 73
End: 2023-05-01
Payer: COMMERCIAL

## 2023-05-01 VITALS
SYSTOLIC BLOOD PRESSURE: 118 MMHG | HEIGHT: 66 IN | OXYGEN SATURATION: 99 % | WEIGHT: 150.8 LBS | DIASTOLIC BLOOD PRESSURE: 68 MMHG | BODY MASS INDEX: 24.23 KG/M2 | TEMPERATURE: 97.4 F | HEART RATE: 65 BPM

## 2023-05-01 DIAGNOSIS — I10 PRIMARY HYPERTENSION: ICD-10-CM

## 2023-05-01 DIAGNOSIS — E78.00 PURE HYPERCHOLESTEROLEMIA: ICD-10-CM

## 2023-05-01 DIAGNOSIS — I25.10 CAD IN NATIVE ARTERY: ICD-10-CM

## 2023-05-01 DIAGNOSIS — J43.1 PANLOBULAR EMPHYSEMA (HCC): ICD-10-CM

## 2023-05-01 DIAGNOSIS — Z72.0 TOBACCO ABUSE: ICD-10-CM

## 2023-05-01 DIAGNOSIS — Z09 FOLLOW-UP EXAM, 3-6 MONTHS SINCE PREVIOUS EXAM: Primary | ICD-10-CM

## 2023-05-01 PROCEDURE — 3078F DIAST BP <80 MM HG: CPT | Performed by: FAMILY MEDICINE

## 2023-05-01 PROCEDURE — 1123F ACP DISCUSS/DSCN MKR DOCD: CPT | Performed by: FAMILY MEDICINE

## 2023-05-01 PROCEDURE — 99214 OFFICE O/P EST MOD 30 MIN: CPT | Performed by: FAMILY MEDICINE

## 2023-05-01 PROCEDURE — 3074F SYST BP LT 130 MM HG: CPT | Performed by: FAMILY MEDICINE

## 2023-05-01 SDOH — ECONOMIC STABILITY: HOUSING INSECURITY
IN THE LAST 12 MONTHS, WAS THERE A TIME WHEN YOU DID NOT HAVE A STEADY PLACE TO SLEEP OR SLEPT IN A SHELTER (INCLUDING NOW)?: NO

## 2023-05-01 SDOH — ECONOMIC STABILITY: FOOD INSECURITY: WITHIN THE PAST 12 MONTHS, THE FOOD YOU BOUGHT JUST DIDN'T LAST AND YOU DIDN'T HAVE MONEY TO GET MORE.: NEVER TRUE

## 2023-05-01 SDOH — ECONOMIC STABILITY: INCOME INSECURITY: HOW HARD IS IT FOR YOU TO PAY FOR THE VERY BASICS LIKE FOOD, HOUSING, MEDICAL CARE, AND HEATING?: NOT HARD AT ALL

## 2023-05-01 SDOH — ECONOMIC STABILITY: FOOD INSECURITY: WITHIN THE PAST 12 MONTHS, YOU WORRIED THAT YOUR FOOD WOULD RUN OUT BEFORE YOU GOT MONEY TO BUY MORE.: NEVER TRUE

## 2023-05-01 ASSESSMENT — ENCOUNTER SYMPTOMS
COLOR CHANGE: 0
DIARRHEA: 0
ABDOMINAL DISTENTION: 0
SINUS PRESSURE: 0
PHOTOPHOBIA: 0
BLOOD IN STOOL: 0
APNEA: 0
SHORTNESS OF BREATH: 0
RHINORRHEA: 0
COUGH: 0
CHEST TIGHTNESS: 0
CONSTIPATION: 0
SINUS PAIN: 0
VOMITING: 0
FACIAL SWELLING: 0

## 2023-05-01 ASSESSMENT — PATIENT HEALTH QUESTIONNAIRE - PHQ9
SUM OF ALL RESPONSES TO PHQ QUESTIONS 1-9: 0
SUM OF ALL RESPONSES TO PHQ QUESTIONS 1-9: 0
2. FEELING DOWN, DEPRESSED OR HOPELESS: 0
SUM OF ALL RESPONSES TO PHQ QUESTIONS 1-9: 0
1. LITTLE INTEREST OR PLEASURE IN DOING THINGS: 0
SUM OF ALL RESPONSES TO PHQ9 QUESTIONS 1 & 2: 0
SUM OF ALL RESPONSES TO PHQ QUESTIONS 1-9: 0

## 2023-05-01 NOTE — PROGRESS NOTES
Genitourinary:  Negative for decreased urine volume, frequency and urgency. Musculoskeletal:  Negative for arthralgias and gait problem. Skin:  Negative for color change. Allergic/Immunologic: Negative for environmental allergies and food allergies. Neurological:  Negative for dizziness, light-headedness and headaches. Hematological:  Negative for adenopathy. Psychiatric/Behavioral:  Negative for agitation and confusion. Physical Exam  Constitutional:       Appearance: Normal appearance. HENT:      Head: Normocephalic and atraumatic. Right Ear: External ear normal.      Left Ear: External ear normal.      Nose: Nose normal. No congestion or rhinorrhea. Eyes:      Extraocular Movements: Extraocular movements intact. Pupils: Pupils are equal, round, and reactive to light. Cardiovascular:      Rate and Rhythm: Normal rate and regular rhythm. Heart sounds: Murmur heard. Systolic murmur is present with a grade of 2/6. No friction rub. No gallop. Pulmonary:      Effort: Pulmonary effort is normal. Prolonged expiration present. Breath sounds: Normal breath sounds. Comments: Coarse breath sounds bilaterally   Chest:      Chest wall: No tenderness. Abdominal:      General: Abdomen is flat. Bowel sounds are normal. There is no distension. Palpations: Abdomen is soft. Tenderness: There is no abdominal tenderness. Musculoskeletal:         General: Normal range of motion. Cervical back: Normal range of motion. Skin:     General: Skin is warm and dry. Neurological:      General: No focal deficit present. Mental Status: He is alert and oriented to person, place, and time. Psychiatric:         Mood and Affect: Mood normal.       Assessment:  Thanh Longo was seen today for follow-up.     Diagnoses and all orders for this visit:    Follow-up exam, 3-6 months since previous exam    Panlobular emphysema ([address]    Pure hypercholesterolemia  -     Lipid Panel;

## 2023-05-06 LAB
ALBUMIN SERPL-MCNC: 4.1 G/DL
ALP BLD-CCNC: 90 U/L
ALT SERPL-CCNC: 12 U/L
ANION GAP SERPL CALCULATED.3IONS-SCNC: NORMAL MMOL/L
AST SERPL-CCNC: 15 U/L
BASOPHILS ABSOLUTE: 49 /ΜL
BASOPHILS RELATIVE PERCENT: 0.6 %
BILIRUB SERPL-MCNC: 0.6 MG/DL (ref 0.1–1.4)
BUN BLDV-MCNC: 26 MG/DL
CALCIUM SERPL-MCNC: 9.5 MG/DL
CHLORIDE BLD-SCNC: 105 MMOL/L
CHOLESTEROL, TOTAL: 123 MG/DL
CHOLESTEROL/HDL RATIO: 2.3
CO2: 27 MMOL/L
CREAT SERPL-MCNC: 1.33 MG/DL
EGFR: 56
EOSINOPHILS ABSOLUTE: 90 /ΜL
EOSINOPHILS RELATIVE PERCENT: 1.1 %
GLUCOSE BLD-MCNC: 115 MG/DL
HCT VFR BLD CALC: 42.2 % (ref 41–53)
HDLC SERPL-MCNC: 54 MG/DL (ref 35–70)
HEMOGLOBIN: 14.3 G/DL (ref 13.5–17.5)
LDL CHOLESTEROL CALCULATED: 54 MG/DL (ref 0–160)
LYMPHOCYTES ABSOLUTE: 1558 /ΜL
LYMPHOCYTES RELATIVE PERCENT: 19 %
MCH RBC QN AUTO: 29.9 PG
MCHC RBC AUTO-ENTMCNC: 33.9 G/DL
MCV RBC AUTO: 88.3 FL
MONOCYTES ABSOLUTE: 640 /ΜL
MONOCYTES RELATIVE PERCENT: 7.8 %
NEUTROPHILS ABSOLUTE: 5863 /ΜL
NEUTROPHILS RELATIVE PERCENT: 71.5 %
NONHDLC SERPL-MCNC: 69 MG/DL
PDW BLD-RTO: 12.7 %
PLATELET # BLD: 121 K/ΜL
PMV BLD AUTO: 12.1 FL
POTASSIUM SERPL-SCNC: 4.8 MMOL/L
RBC # BLD: 4.78 10^6/ΜL
SODIUM BLD-SCNC: 139 MMOL/L
TOTAL PROTEIN: 6.5
TRIGL SERPL-MCNC: 72 MG/DL
VLDLC SERPL CALC-MCNC: NORMAL MG/DL
WBC # BLD: 8.2 10^3/ML

## 2023-05-08 ENCOUNTER — TELEPHONE (OUTPATIENT)
Dept: FAMILY MEDICINE CLINIC | Age: 73
End: 2023-05-08

## 2023-05-08 DIAGNOSIS — E78.00 PURE HYPERCHOLESTEROLEMIA: ICD-10-CM

## 2023-05-08 DIAGNOSIS — I10 PRIMARY HYPERTENSION: ICD-10-CM

## 2023-05-08 NOTE — TELEPHONE ENCOUNTER
Lab results  Per Dr Gino Mendosa:  Kindra Davies Normal    Per communication form, left detailed message on patient's voicemail letting him know that his labs came back normal.

## 2023-08-30 DIAGNOSIS — I25.10 CORONARY ARTERY DISEASE INVOLVING NATIVE CORONARY ARTERY OF NATIVE HEART WITHOUT ANGINA PECTORIS: ICD-10-CM

## 2023-08-30 RX ORDER — METOPROLOL SUCCINATE 50 MG/1
50 TABLET, EXTENDED RELEASE ORAL 2 TIMES DAILY
Qty: 180 TABLET | Refills: 0 | Status: SHIPPED | OUTPATIENT
Start: 2023-08-30 | End: 2023-11-28

## 2023-08-30 NOTE — TELEPHONE ENCOUNTER
Shahana Camarena called for a refill on Metoprolol, please send to [facility] in [place].      Last Appointment:  5/1/2023  Future Appointments   Date Time Provider [address] [place] [address]   10/6/2023 10:00 AM Jayson Figueroa MD [facility]    '

## 2023-10-06 ENCOUNTER — OFFICE VISIT (OUTPATIENT)
Dept: FAMILY MEDICINE CLINIC | Age: 73
End: 2023-10-06
Payer: MEDICARE

## 2023-10-06 VITALS
BODY MASS INDEX: 23.66 KG/M2 | TEMPERATURE: 97.4 F | SYSTOLIC BLOOD PRESSURE: 100 MMHG | DIASTOLIC BLOOD PRESSURE: 58 MMHG | HEART RATE: 59 BPM | HEIGHT: 66 IN | WEIGHT: 147.2 LBS | OXYGEN SATURATION: 97 %

## 2023-10-06 DIAGNOSIS — I35.8 AORTIC VALVE SCLEROSIS: Primary | ICD-10-CM

## 2023-10-06 DIAGNOSIS — I10 ESSENTIAL HYPERTENSION: ICD-10-CM

## 2023-10-06 DIAGNOSIS — I25.10 CORONARY ARTERY DISEASE INVOLVING NATIVE CORONARY ARTERY OF NATIVE HEART WITHOUT ANGINA PECTORIS: ICD-10-CM

## 2023-10-06 PROCEDURE — 1123F ACP DISCUSS/DSCN MKR DOCD: CPT | Performed by: FAMILY MEDICINE

## 2023-10-06 PROCEDURE — 3074F SYST BP LT 130 MM HG: CPT | Performed by: FAMILY MEDICINE

## 2023-10-06 PROCEDURE — 99213 OFFICE O/P EST LOW 20 MIN: CPT | Performed by: FAMILY MEDICINE

## 2023-10-06 PROCEDURE — 3078F DIAST BP <80 MM HG: CPT | Performed by: FAMILY MEDICINE

## 2023-10-06 RX ORDER — METOPROLOL SUCCINATE 50 MG/1
50 TABLET, EXTENDED RELEASE ORAL 2 TIMES DAILY
Qty: 180 TABLET | Refills: 1 | Status: SHIPPED | OUTPATIENT
Start: 2023-10-06 | End: 2024-04-03

## 2023-10-06 RX ORDER — RAMIPRIL 10 MG/1
10 CAPSULE ORAL DAILY
Qty: 90 CAPSULE | Refills: 1 | Status: SHIPPED | OUTPATIENT
Start: 2023-10-06

## 2023-10-06 RX ORDER — AMLODIPINE BESYLATE 5 MG/1
5 TABLET ORAL DAILY
Qty: 90 TABLET | Refills: 1 | Status: SHIPPED | OUTPATIENT
Start: 2023-10-06 | End: 2024-07-02

## 2023-10-06 NOTE — PROGRESS NOTES
Take 1 tablet by mouth daily  -     Nicole Hernandez MD, Cardiology, [facility]    Essential hypertension  -     ramipril (ALTACE) 10 MG capsule; Take 1 capsule by mouth daily          Follow Up:  Return in about 4 months (around 2/6/2024). As above. Call or go to ED immediately if symptoms worsen or persist.  Return in about 4 months (around 2/6/2024). , or sooner if necessary. Educational materials and/or home exercises printed for patient's review and were included in patient instructions on his/her After Visit Summary and given to patient at the end ofvisit. Counseled regarding above diagnosis, including possible risks and complications,  especially if left uncontrolled. Counseledregarding the possible side effects, risks, benefits and alternatives to treatment; patient and/or guardian verbalizes understanding, agrees, feels comfortable with and wishes to proceed with above treatment plan. patient to call with any new medication issues, and read all Rx info from pharmacy to assure aware of all possible risks and side effects of medication before taking. Reviewed age and gender appropriatehealth screening exams and vaccinations. Advised patient regarding importance of keeping up with recommended health maintenance and to schedule as soon as possible if overdue, as this is important in assessing forundiagnosed pathology, especially cancer, as well as protecting against potentially harmful/life threatening disease. Patient and/or guardian verbalizes understanding and agrees with above counseling,assessment and plan. All questions answered.

## 2023-10-08 ASSESSMENT — ENCOUNTER SYMPTOMS
CHEST TIGHTNESS: 0
SINUS PAIN: 0
FACIAL SWELLING: 0
COLOR CHANGE: 0
COUGH: 0
VOMITING: 0
SHORTNESS OF BREATH: 0
RHINORRHEA: 0
ABDOMINAL DISTENTION: 0
SINUS PRESSURE: 0
PHOTOPHOBIA: 0
APNEA: 0
BLOOD IN STOOL: 0
DIARRHEA: 0
CONSTIPATION: 0

## 2024-02-05 ENCOUNTER — OFFICE VISIT (OUTPATIENT)
Dept: FAMILY MEDICINE CLINIC | Age: 74
End: 2024-02-05
Payer: MEDICARE

## 2024-02-05 VITALS
TEMPERATURE: 97.5 F | HEIGHT: 66 IN | BODY MASS INDEX: 23.78 KG/M2 | HEART RATE: 55 BPM | SYSTOLIC BLOOD PRESSURE: 114 MMHG | DIASTOLIC BLOOD PRESSURE: 74 MMHG | WEIGHT: 148 LBS | OXYGEN SATURATION: 97 %

## 2024-02-05 DIAGNOSIS — R09.82 PND (POST-NASAL DRIP): ICD-10-CM

## 2024-02-05 DIAGNOSIS — R06.89 ABNORMAL BREATH SOUNDS: ICD-10-CM

## 2024-02-05 DIAGNOSIS — J43.1 PANLOBULAR EMPHYSEMA (HCC): ICD-10-CM

## 2024-02-05 DIAGNOSIS — R05.1 ACUTE COUGH: Primary | ICD-10-CM

## 2024-02-05 DIAGNOSIS — I25.10 CORONARY ARTERY DISEASE INVOLVING NATIVE CORONARY ARTERY OF NATIVE HEART WITHOUT ANGINA PECTORIS: ICD-10-CM

## 2024-02-05 DIAGNOSIS — E55.9 VITAMIN D DEFICIENCY: ICD-10-CM

## 2024-02-05 DIAGNOSIS — I10 ESSENTIAL HYPERTENSION: ICD-10-CM

## 2024-02-05 PROCEDURE — 99214 OFFICE O/P EST MOD 30 MIN: CPT | Performed by: FAMILY MEDICINE

## 2024-02-05 PROCEDURE — 3078F DIAST BP <80 MM HG: CPT | Performed by: FAMILY MEDICINE

## 2024-02-05 PROCEDURE — 3074F SYST BP LT 130 MM HG: CPT | Performed by: FAMILY MEDICINE

## 2024-02-05 PROCEDURE — 1123F ACP DISCUSS/DSCN MKR DOCD: CPT | Performed by: FAMILY MEDICINE

## 2024-02-05 RX ORDER — ERGOCALCIFEROL 1.25 MG/1
50000 CAPSULE ORAL WEEKLY
Qty: 12 CAPSULE | Refills: 1 | Status: SHIPPED | OUTPATIENT
Start: 2024-02-05

## 2024-02-05 RX ORDER — AMLODIPINE BESYLATE 5 MG/1
5 TABLET ORAL DAILY
Qty: 90 TABLET | Refills: 1 | Status: SHIPPED | OUTPATIENT
Start: 2024-02-05 | End: 2024-11-01

## 2024-02-05 RX ORDER — METOPROLOL SUCCINATE 50 MG/1
50 TABLET, EXTENDED RELEASE ORAL 2 TIMES DAILY
Qty: 180 TABLET | Refills: 1 | Status: SHIPPED | OUTPATIENT
Start: 2024-02-05 | End: 2024-08-03

## 2024-02-05 RX ORDER — ATORVASTATIN CALCIUM 80 MG/1
80 TABLET, FILM COATED ORAL NIGHTLY
Qty: 90 TABLET | Refills: 1 | Status: SHIPPED | OUTPATIENT
Start: 2024-02-05

## 2024-02-05 RX ORDER — CETIRIZINE HYDROCHLORIDE 10 MG/1
10 TABLET ORAL DAILY
Qty: 30 TABLET | Refills: 0 | Status: SHIPPED | OUTPATIENT
Start: 2024-02-05 | End: 2024-03-06

## 2024-02-05 RX ORDER — RAMIPRIL 10 MG/1
10 CAPSULE ORAL DAILY
Qty: 90 CAPSULE | Refills: 1 | Status: SHIPPED | OUTPATIENT
Start: 2024-02-05

## 2024-02-05 RX ORDER — FLUTICASONE PROPIONATE 50 MCG
1 SPRAY, SUSPENSION (ML) NASAL DAILY
Qty: 16 G | Refills: 0 | Status: SHIPPED | OUTPATIENT
Start: 2024-02-05

## 2024-02-05 ASSESSMENT — ENCOUNTER SYMPTOMS
SINUS PAIN: 0
DIARRHEA: 0
FACIAL SWELLING: 0
CHEST TIGHTNESS: 0
CONSTIPATION: 0
RHINORRHEA: 0
VOMITING: 0
SINUS PRESSURE: 0
APNEA: 0
BLOOD IN STOOL: 0
ABDOMINAL DISTENTION: 0
COUGH: 0
COLOR CHANGE: 0
SHORTNESS OF BREATH: 0
PHOTOPHOBIA: 0

## 2024-02-05 ASSESSMENT — PATIENT HEALTH QUESTIONNAIRE - PHQ9
SUM OF ALL RESPONSES TO PHQ9 QUESTIONS 1 & 2: 0
1. LITTLE INTEREST OR PLEASURE IN DOING THINGS: 0
SUM OF ALL RESPONSES TO PHQ QUESTIONS 1-9: 0
2. FEELING DOWN, DEPRESSED OR HOPELESS: 0
SUM OF ALL RESPONSES TO PHQ QUESTIONS 1-9: 0

## 2024-02-05 NOTE — PROGRESS NOTES
vaccinations.  Advised patient regarding importance of keeping up with recommended health maintenance and to schedule as soon as possible if overdue, as this is important in assessing forundiagnosed pathology, especially cancer, as well as protecting against potentially harmful/life threatening disease.        Patient and/or guardian verbalizes understanding and agrees with above counseling,assessment and plan.    All questions answered.

## 2024-06-21 ENCOUNTER — OFFICE VISIT (OUTPATIENT)
Dept: FAMILY MEDICINE CLINIC | Age: 74
End: 2024-06-21
Payer: MEDICARE

## 2024-06-21 VITALS
WEIGHT: 143 LBS | HEIGHT: 66 IN | BODY MASS INDEX: 22.98 KG/M2 | TEMPERATURE: 98.1 F | OXYGEN SATURATION: 97 % | DIASTOLIC BLOOD PRESSURE: 80 MMHG | SYSTOLIC BLOOD PRESSURE: 122 MMHG | HEART RATE: 58 BPM

## 2024-06-21 VITALS — SYSTOLIC BLOOD PRESSURE: 122 MMHG | DIASTOLIC BLOOD PRESSURE: 80 MMHG | WEIGHT: 143 LBS | BODY MASS INDEX: 23.08 KG/M2

## 2024-06-21 DIAGNOSIS — J43.1 PANLOBULAR EMPHYSEMA (HCC): ICD-10-CM

## 2024-06-21 DIAGNOSIS — I10 ESSENTIAL HYPERTENSION: Primary | ICD-10-CM

## 2024-06-21 DIAGNOSIS — I25.10 CORONARY ARTERY DISEASE INVOLVING NATIVE CORONARY ARTERY OF NATIVE HEART WITHOUT ANGINA PECTORIS: ICD-10-CM

## 2024-06-21 DIAGNOSIS — Z00.00 MEDICARE ANNUAL WELLNESS VISIT, SUBSEQUENT: Primary | ICD-10-CM

## 2024-06-21 DIAGNOSIS — Z71.89 ACP (ADVANCE CARE PLANNING): ICD-10-CM

## 2024-06-21 PROCEDURE — G0439 PPPS, SUBSEQ VISIT: HCPCS | Performed by: FAMILY MEDICINE

## 2024-06-21 PROCEDURE — 1123F ACP DISCUSS/DSCN MKR DOCD: CPT | Performed by: FAMILY MEDICINE

## 2024-06-21 PROCEDURE — 99214 OFFICE O/P EST MOD 30 MIN: CPT | Performed by: FAMILY MEDICINE

## 2024-06-21 PROCEDURE — 3079F DIAST BP 80-89 MM HG: CPT | Performed by: FAMILY MEDICINE

## 2024-06-21 PROCEDURE — 3074F SYST BP LT 130 MM HG: CPT | Performed by: FAMILY MEDICINE

## 2024-06-21 RX ORDER — METOPROLOL SUCCINATE 50 MG/1
50 TABLET, EXTENDED RELEASE ORAL 2 TIMES DAILY
Qty: 180 TABLET | Refills: 1 | Status: SHIPPED | OUTPATIENT
Start: 2024-06-21 | End: 2024-12-18

## 2024-06-21 SDOH — ECONOMIC STABILITY: FOOD INSECURITY: WITHIN THE PAST 12 MONTHS, YOU WORRIED THAT YOUR FOOD WOULD RUN OUT BEFORE YOU GOT MONEY TO BUY MORE.: NEVER TRUE

## 2024-06-21 SDOH — ECONOMIC STABILITY: FOOD INSECURITY: WITHIN THE PAST 12 MONTHS, THE FOOD YOU BOUGHT JUST DIDN'T LAST AND YOU DIDN'T HAVE MONEY TO GET MORE.: NEVER TRUE

## 2024-06-21 SDOH — ECONOMIC STABILITY: INCOME INSECURITY: HOW HARD IS IT FOR YOU TO PAY FOR THE VERY BASICS LIKE FOOD, HOUSING, MEDICAL CARE, AND HEATING?: NOT HARD AT ALL

## 2024-06-21 ASSESSMENT — ENCOUNTER SYMPTOMS
SINUS PRESSURE: 0
CHEST TIGHTNESS: 0
VOMITING: 0
SHORTNESS OF BREATH: 0
ABDOMINAL DISTENTION: 0
CONSTIPATION: 0
SINUS PAIN: 0
RHINORRHEA: 0
DIARRHEA: 0
COUGH: 0
FACIAL SWELLING: 0
PHOTOPHOBIA: 0
APNEA: 0
BLOOD IN STOOL: 0
COLOR CHANGE: 0

## 2024-06-21 ASSESSMENT — PATIENT HEALTH QUESTIONNAIRE - PHQ9
2. FEELING DOWN, DEPRESSED OR HOPELESS: NOT AT ALL
SUM OF ALL RESPONSES TO PHQ9 QUESTIONS 1 & 2: 0
SUM OF ALL RESPONSES TO PHQ QUESTIONS 1-9: 0
SUM OF ALL RESPONSES TO PHQ QUESTIONS 1-9: 0
1. LITTLE INTEREST OR PLEASURE IN DOING THINGS: NOT AT ALL
SUM OF ALL RESPONSES TO PHQ QUESTIONS 1-9: 0
SUM OF ALL RESPONSES TO PHQ QUESTIONS 1-9: 0

## 2024-06-21 ASSESSMENT — LIFESTYLE VARIABLES
HOW MANY STANDARD DRINKS CONTAINING ALCOHOL DO YOU HAVE ON A TYPICAL DAY: PATIENT DOES NOT DRINK
HOW OFTEN DO YOU HAVE A DRINK CONTAINING ALCOHOL: NEVER

## 2024-06-21 NOTE — PATIENT INSTRUCTIONS
review.    Other Preventive Recommendations:    A preventive eye exam performed by an eye specialist is recommended every 1-2 years to screen for glaucoma; cataracts, macular degeneration, and other eye disorders.  A preventive dental visit is recommended every 6 months.  Try to get at least 150 minutes of exercise per week or 10,000 steps per day on a pedometer .  Order or download the FREE \"Exercise & Physical Activity: Your Everyday Guide\" from [employer] [place] [employer]. Call 1-431.110.7373 or search [employer] [place] [employer] online.  You need 2383-5257 mg of calcium and 1190-2531 IU of vitamin D per day. It is possible to meet your calcium requirement with diet alone, but a vitamin D supplement is usually necessary to meet this goal.  When exposed to the sun, use a sunscreen that protects against both UVA and UVB radiation with an SPF of 30 or greater. Reapply every 2 to 3 hours or after sweating, drying off with a towel, or swimming.  Always wear a seat belt when traveling in a car. Always wear a helmet when riding a bicycle or motorcycle.

## 2024-06-21 NOTE — PROGRESS NOTES
frequency and urgency.   Musculoskeletal:  Negative for arthralgias and gait problem.   Skin:  Negative for color change.   Allergic/Immunologic: Negative for environmental allergies and food allergies.   Neurological:  Negative for dizziness, light-headedness and headaches.   Hematological:  Negative for adenopathy.   Psychiatric/Behavioral:  Negative for agitation and confusion.        Physical Exam  Constitutional:       Appearance: Normal appearance.   HENT:      Head: Normocephalic and atraumatic.      Right Ear: There is no impacted cerumen.      Left Ear: There is no impacted cerumen.      Nose: Nose normal. No congestion or rhinorrhea.   Eyes:      Extraocular Movements: Extraocular movements intact.      Pupils: Pupils are equal, round, and reactive to light.   Cardiovascular:      Rate and Rhythm: Normal rate and regular rhythm.      Heart sounds: No murmur heard.     No friction rub. No gallop.   Pulmonary:      Effort: Pulmonary effort is normal.      Breath sounds: Normal breath sounds.   Chest:      Chest wall: No tenderness.   Abdominal:      General: Abdomen is flat. Bowel sounds are normal. There is no distension.      Palpations: Abdomen is soft.      Tenderness: There is no abdominal tenderness.   Musculoskeletal:         General: Normal range of motion.      Cervical back: Normal range of motion.   Skin:     General: Skin is warm and dry.   Neurological:      General: No focal deficit present.      Mental Status: He is alert and oriented to person, place, and time.   Psychiatric:         Mood and Affect: Mood normal.         Assessment:  Gordon was seen today for 4 month follow up .    Diagnoses and all orders for this visit:    Essential hypertension  Comments:  well controlled   doing well   continue care  Orders:  -     Comprehensive Metabolic Panel; Future  -     TSH; Future  -     CBC with Auto Differential; Future    Coronary artery disease involving native coronary artery of native heart

## 2024-06-21 NOTE — PROGRESS NOTES
appointment with their eye specialist      Advanced Directives:  Do you have a Living Will?: (!) No    Intervention:          Tobacco Use:  Tobacco Use: High Risk (10/6/2023)    Patient History     Smoking Tobacco Use: Every Day     Smokeless Tobacco Use: Never     Passive Exposure: Not on file     E-cigarette/Vaping       Questions Responses    E-cigarette/Vaping Use Never User    Start Date     Passive Exposure     Quit Date     Counseling Given     Comments           Interventions:  Patient advised to follow up in the office for further evalution and treatment                      Objective   Vitals:    06/21/24 0936   BP: 122/80   Weight: 64.9 kg (143 lb)      Body mass index is 23.08 kg/m².               No Known Allergies  Prior to Visit Medications    Medication Sig Taking? Authorizing Provider   amLODIPine (NORVASC) 5 MG tablet Take 1 tablet by mouth daily  Jorge A Clark MD   atorvastatin (LIPITOR) 80 MG tablet Take 1 tablet by mouth nightly  Jorge A Clark MD   metoprolol succinate (TOPROL XL) 50 MG extended release tablet Take 1 tablet by mouth 2 times daily  Jorge A Clark MD   ramipril (ALTACE) 10 MG capsule Take 1 capsule by mouth daily  Jorge A Clark MD   vitamin D (ERGOCALCIFEROL) 1.25 MG (83634 UT) CAPS capsule Take 1 capsule by mouth once a week  Jorge A Clark MD   fluticasone (FLONASE) 50 MCG/ACT nasal spray 1 spray by Each Nostril route daily  Jorge A Clark MD   magnesium (MAGNESIUM-OXIDE) 250 MG TABS tablet Take 1 tablet by mouth daily  Wolfgang Butt MD   Cyanocobalamin (VITAMIN B12 PO) Take 2,500 mcg by mouth daily  Wolfgang Butt MD   aspirin 325 MG tablet Take 1 tablet by mouth daily  Pierre Ayers MD       [facility] (Including outside providers/suppliers regularly involved in providing care):   Patient Care Team:  Jorge A Clark MD as PCP - General (Family Medicine)  Jorge A Clark MD as PCP - Empaneled Provider     Reviewed and updated this visit:  Meds

## 2024-06-22 LAB
ALBUMIN SERPL-MCNC: 3.8 G/DL
ALP BLD-CCNC: 74 U/L
ALT SERPL-CCNC: 12 U/L
ANION GAP SERPL CALCULATED.3IONS-SCNC: NORMAL MMOL/L
AST SERPL-CCNC: 15 U/L
BASOPHILS ABSOLUTE: 50 /ΜL
BASOPHILS RELATIVE PERCENT: 0.7 %
BILIRUB SERPL-MCNC: 0.7 MG/DL (ref 0.1–1.4)
BUN BLDV-MCNC: 24 MG/DL
CALCIUM SERPL-MCNC: 9.2 MG/DL
CHLORIDE BLD-SCNC: 107 MMOL/L
CHOLESTEROL, TOTAL: 116 MG/DL
CHOLESTEROL/HDL RATIO: 2.3
CO2: 28 MMOL/L
CREAT SERPL-MCNC: 1.32 MG/DL
EGFR: 57
EOSINOPHILS ABSOLUTE: 128 /ΜL
EOSINOPHILS RELATIVE PERCENT: 1.8 %
GLUCOSE BLD-MCNC: 114 MG/DL
HCT VFR BLD CALC: 40.4 % (ref 41–53)
HDLC SERPL-MCNC: 51 MG/DL (ref 35–70)
HEMOGLOBIN: 13.1 G/DL (ref 13.5–17.5)
LDL CHOLESTEROL CALCULATED: 52 MG/DL (ref 0–160)
LYMPHOCYTES ABSOLUTE: 1399 /ΜL
LYMPHOCYTES RELATIVE PERCENT: 19.7 %
MCH RBC QN AUTO: 29.4 PG
MCHC RBC AUTO-ENTMCNC: 32.4 G/DL
MCV RBC AUTO: 90.6 FL
MONOCYTES ABSOLUTE: 568 /ΜL
MONOCYTES RELATIVE PERCENT: 8 %
NEUTROPHILS ABSOLUTE: 4956 /ΜL
NEUTROPHILS RELATIVE PERCENT: 69.8 %
NONHDLC SERPL-MCNC: 65 MG/DL
PDW BLD-RTO: 13.4 %
PLATELET # BLD: 115 K/ΜL
PMV BLD AUTO: 12 FL
POTASSIUM SERPL-SCNC: 4.7 MMOL/L
RBC # BLD: 4.46 10^6/ΜL
SODIUM BLD-SCNC: 141 MMOL/L
TOTAL PROTEIN: 6.2
TRIGL SERPL-MCNC: 57 MG/DL
TSH SERPL DL<=0.05 MIU/L-ACNC: 1.85 UIU/ML
VLDLC SERPL CALC-MCNC: NORMAL MG/DL
WBC # BLD: 7.1 10^3/ML

## 2024-06-24 ENCOUNTER — CLINICAL DOCUMENTATION (OUTPATIENT)
Dept: SPIRITUAL SERVICES | Age: 74
End: 2024-06-24

## 2024-06-24 ENCOUNTER — TELEPHONE (OUTPATIENT)
Dept: FAMILY MEDICINE CLINIC | Age: 74
End: 2024-06-24

## 2024-06-24 DIAGNOSIS — I10 ESSENTIAL HYPERTENSION: ICD-10-CM

## 2024-06-24 DIAGNOSIS — I25.10 CORONARY ARTERY DISEASE INVOLVING NATIVE CORONARY ARTERY OF NATIVE HEART WITHOUT ANGINA PECTORIS: ICD-10-CM

## 2024-06-24 NOTE — TELEPHONE ENCOUNTER
Dr Clark reviewed lab results from [facility], creatinine is stable when compared to previous labs. No changes needed.

## 2024-06-24 NOTE — ACP (ADVANCE CARE PLANNING)
Advance Care Planning   Ambulatory ACP Specialist Patient Outreach    Date:  6/24/2024    ACP Specialist:  Kendra Atkinson LPN    Outreach call to patient in follow-up to ACP Specialist referral from:Jorge A Clark MD    [x] PCP  [] Provider   [] Ambulatory Care Management [] Other     For:                  [x] Advance Directive Assistance              [] Complete Portable DNR order              [] Complete POST/POLST/MOST              [x] Code Status Discussion             [x] Discuss Goals of Care             [x] Early ACP Decision-Making              [] Other (Specify)    Date Referral Received: 6/21/24    Next Step:   [] ACP scheduled conversation  [x] Outreach again in one week               [] Email / Mail ACP Info Sheets  [] Email / Mail Advance Directive   [] Closing referral.  Routing closure to referring provider/staff and to ACP Specialist .    [] Closure letter mailed to patient with invitation to contact ACP Specialist if / when ready.   [] Other (Specify here):         [x] At this time, Healthcare Decision Maker Is:  Advance Care Planning   Healthcare Decision Maker:    Primary Decision Maker: Ivette Quinn - Spouse - [phone]         [] Primary agent named in scanned advance directive.    [x] Legal Next of Kin.     [] Unable to determine legal decision maker at this time.         Outreaches:       [x] 1st -  Date:  6/24/24               Intervention:  [] Spoke with Patient   [x] Left Voice mail [] Email / Mail    [x] MyChart  [] Other (Specify) :     Outcomes:Writer attempted ACP outreach to patient's preferred number ([phone]).  Writer left voicemail requesting return call including call back number.  Mychart message sent. ACP outreach will be attempted in about one week if return call not received.           [] 2nd -  Date:                 Intervention:  [] Spoke with Patient  [] Left Voice mail [] Email / Mail    [] MyChart  [] Other (Specify) :              Outcomes:

## 2024-08-09 RX ORDER — MOMETASONE FUROATE AND FORMOTEROL FUMARATE DIHYDRATE 100; 5 UG/1; UG/1
2 AEROSOL RESPIRATORY (INHALATION) 2 TIMES DAILY
Qty: 13 G | Refills: 1 | Status: SHIPPED | OUTPATIENT
Start: 2024-08-09

## 2024-08-09 NOTE — TELEPHONE ENCOUNTER
Last Appointment:  6/21/2024  Future Appointments   Date Time Provider Department Center   10/21/2024  9:45 AM Jorge A Clark MD [facility]

## 2024-10-07 DIAGNOSIS — I10 ESSENTIAL HYPERTENSION: ICD-10-CM

## 2024-10-08 NOTE — TELEPHONE ENCOUNTER
Last Appointment:  6/21/2024  Future Appointments   Date Time Provider Department Center   10/24/2024  1:30 PM Jorge A Clark MD [facility]

## 2024-10-09 RX ORDER — RAMIPRIL 10 MG/1
10 CAPSULE ORAL DAILY
Qty: 90 CAPSULE | Refills: 0 | Status: SHIPPED | OUTPATIENT
Start: 2024-10-09

## 2024-10-24 ENCOUNTER — OFFICE VISIT (OUTPATIENT)
Dept: FAMILY MEDICINE CLINIC | Age: 74
End: 2024-10-24

## 2024-10-24 VITALS
DIASTOLIC BLOOD PRESSURE: 84 MMHG | BODY MASS INDEX: 23.14 KG/M2 | HEIGHT: 66 IN | TEMPERATURE: 97.9 F | WEIGHT: 144 LBS | HEART RATE: 60 BPM | OXYGEN SATURATION: 99 % | SYSTOLIC BLOOD PRESSURE: 134 MMHG

## 2024-10-24 DIAGNOSIS — E55.9 VITAMIN D DEFICIENCY: ICD-10-CM

## 2024-10-24 DIAGNOSIS — I25.10 CORONARY ARTERY DISEASE INVOLVING NATIVE CORONARY ARTERY OF NATIVE HEART WITHOUT ANGINA PECTORIS: ICD-10-CM

## 2024-10-24 DIAGNOSIS — I10 ESSENTIAL HYPERTENSION: ICD-10-CM

## 2024-10-24 RX ORDER — RAMIPRIL 10 MG/1
10 CAPSULE ORAL NIGHTLY
Qty: 90 CAPSULE | Refills: 1 | Status: SHIPPED | OUTPATIENT
Start: 2024-10-24

## 2024-10-24 RX ORDER — AMLODIPINE BESYLATE 5 MG/1
5 TABLET ORAL DAILY
Qty: 90 TABLET | Refills: 1 | Status: SHIPPED | OUTPATIENT
Start: 2024-10-24 | End: 2025-07-21

## 2024-10-24 RX ORDER — ATORVASTATIN CALCIUM 80 MG/1
80 TABLET, FILM COATED ORAL NIGHTLY
Qty: 90 TABLET | Refills: 1 | Status: SHIPPED | OUTPATIENT
Start: 2024-10-24

## 2024-10-24 RX ORDER — ERGOCALCIFEROL 1.25 MG/1
50000 CAPSULE, LIQUID FILLED ORAL
Qty: 6 CAPSULE | Refills: 1 | Status: SHIPPED | OUTPATIENT
Start: 2024-10-24

## 2024-10-24 NOTE — PROGRESS NOTES
Gordon Quinn is a 74 y.o. male accompanied by his wife;   CC:   Chief Complaint   Patient presents with    4 month follow up     Hypertension    Hyperlipidemia       4 month follow up:   Hypertension:   F/U   Doing well   well Controlled   BP Readings from Last 3 Encounters:   10/24/24 134/84   06/21/24 122/80   06/21/24 122/80     Taking medications   Tolerating well   No sx/ss of low bp     Hyperlipidemia:   F/U   Controlled   Lab Results   Component Value Date    CHOL 116 06/22/2024    TRIG 57 06/22/2024    HDL 51 06/22/2024    VLDL 20 07/04/2018    CHOLHDLRATIO 2.3 06/22/2024     The ASCVD Risk score (Tru ALCANTAR, et al., 2019) failed to calculate for the following reasons:    The valid total cholesterol range is 130 to 320 mg/dL  Taking Statin   Tolerating well   No myalgia  Last labs reviewed   - no evidence of elevation in ALT/AST       Patient's past medical, surgical, social and/or family history reviewed, updated in chart, and are non-contributory (unless otherwise stated).  Medications and allergies also reviewed and updated in chart.      /84 (Site: Right Upper Arm, Position: Sitting, Cuff Size: Medium Adult)   Pulse 60   Temp 97.9 °F (36.6 °C)   Ht 1.676 m (5' 6\")   Wt 65.3 kg (144 lb)   SpO2 99%   BMI 23.24 kg/m²     Review of Systems   Constitutional:  Negative for chills, fatigue and fever.   HENT:  Negative for congestion, ear pain, facial swelling, rhinorrhea, sinus pressure and sinus pain.    Eyes:  Negative for photophobia and visual disturbance.   Respiratory:  Negative for apnea, cough, chest tightness and shortness of breath.    Cardiovascular:  Negative for chest pain and palpitations.   Gastrointestinal:  Negative for abdominal distention, blood in stool, constipation, diarrhea and vomiting.   Endocrine: Negative for cold intolerance, polydipsia, polyphagia and polyuria.   Genitourinary:  Negative for decreased urine volume, frequency and urgency.   Musculoskeletal:

## 2025-02-17 ASSESSMENT — PATIENT HEALTH QUESTIONNAIRE - PHQ9
1. LITTLE INTEREST OR PLEASURE IN DOING THINGS: NOT AT ALL
1. LITTLE INTEREST OR PLEASURE IN DOING THINGS: NOT AT ALL
2. FEELING DOWN, DEPRESSED OR HOPELESS: NOT AT ALL
SUM OF ALL RESPONSES TO PHQ QUESTIONS 1-9: 0
2. FEELING DOWN, DEPRESSED OR HOPELESS: NOT AT ALL
SUM OF ALL RESPONSES TO PHQ QUESTIONS 1-9: 0
SUM OF ALL RESPONSES TO PHQ9 QUESTIONS 1 & 2: 0
SUM OF ALL RESPONSES TO PHQ9 QUESTIONS 1 & 2: 0

## 2025-02-20 ENCOUNTER — OFFICE VISIT (OUTPATIENT)
Dept: FAMILY MEDICINE CLINIC | Age: 75
End: 2025-02-20

## 2025-02-20 VITALS
HEART RATE: 60 BPM | DIASTOLIC BLOOD PRESSURE: 84 MMHG | RESPIRATION RATE: 18 BRPM | OXYGEN SATURATION: 98 % | WEIGHT: 143 LBS | SYSTOLIC BLOOD PRESSURE: 110 MMHG | BODY MASS INDEX: 23.82 KG/M2 | HEIGHT: 65 IN | TEMPERATURE: 97.1 F

## 2025-02-20 DIAGNOSIS — I10 ESSENTIAL HYPERTENSION: ICD-10-CM

## 2025-02-20 DIAGNOSIS — E78.00 PURE HYPERCHOLESTEROLEMIA: ICD-10-CM

## 2025-02-20 DIAGNOSIS — Z12.11 SCREENING FOR COLON CANCER: Primary | ICD-10-CM

## 2025-02-20 DIAGNOSIS — I25.10 CORONARY ARTERY DISEASE INVOLVING NATIVE CORONARY ARTERY OF NATIVE HEART WITHOUT ANGINA PECTORIS: ICD-10-CM

## 2025-02-20 RX ORDER — ATORVASTATIN CALCIUM 80 MG/1
80 TABLET, FILM COATED ORAL NIGHTLY
Qty: 90 TABLET | Refills: 1 | Status: SHIPPED | OUTPATIENT
Start: 2025-02-20

## 2025-02-20 RX ORDER — METOPROLOL SUCCINATE 50 MG/1
50 TABLET, EXTENDED RELEASE ORAL 2 TIMES DAILY
Qty: 180 TABLET | Refills: 1 | Status: SHIPPED | OUTPATIENT
Start: 2025-02-20 | End: 2025-08-19

## 2025-02-20 RX ORDER — AMLODIPINE BESYLATE 5 MG/1
5 TABLET ORAL DAILY
Qty: 90 TABLET | Refills: 1 | Status: SHIPPED
Start: 2025-02-20 | End: 2025-02-20 | Stop reason: ALTCHOICE

## 2025-02-20 ASSESSMENT — ENCOUNTER SYMPTOMS
CONSTIPATION: 0
APNEA: 0
VOMITING: 0
SINUS PAIN: 0
COLOR CHANGE: 0
RHINORRHEA: 0
SINUS PRESSURE: 0
FACIAL SWELLING: 0
BLOOD IN STOOL: 0
SHORTNESS OF BREATH: 0
DIARRHEA: 0
CHEST TIGHTNESS: 0
COUGH: 0
PHOTOPHOBIA: 0
ABDOMINAL DISTENTION: 0

## 2025-02-20 NOTE — PROGRESS NOTES
Gordon Quinn is a 74 y.o. male   CC:   Chief Complaint   Patient presents with    4 month follow up       History of Present Illness  The patient presents for evaluation of hypertension.    Hypertension  He reports no current health complaints. He has been adhering to his prescribed medication regimen. He occasionally monitors his blood pressure at home, which consistently registers within the normal range.  - Alleviating/Aggravating Factors: Adhering to prescribed medication regimen.  - Timing: Occasionally monitors blood pressure at home.  - Severity: Blood pressure consistently within the normal range.    Nasal Congestion  He has been adhering to his prescribed medication regimen, with the exception of Flonase, which he utilizes on an as-needed basis for nasal congestion. He notes a decrease in the frequency of nasal congestion compared to previous episodes.  - Alleviating/Aggravating Factors: Utilizes Flonase on an as-needed basis.  - Severity: Decrease in the frequency of nasal congestion compared to previous episodes.    He continues to smoke and declines a chest CT scan to check for any new nodules.    SOCIAL HISTORY  The patient admits to smoking.    MEDICATIONS  Current: Flonase (as needed), ramipril, metoprolol, amlodipine    Patient's past medical, surgical, social and/or family history reviewed, updated in chart, and are non-contributory (unless otherwise stated).  Medications and allergies also reviewed and updated in chart.      Results      /84   Pulse 60   Temp 97.1 °F (36.2 °C)   Resp 18   Ht 1.651 m (5' 5\")   Wt 64.9 kg (143 lb)   SpO2 98%   BMI 23.80 kg/m²     Review of Systems   Constitutional:  Negative for chills, fatigue and fever.   HENT:  Negative for congestion, ear pain, facial swelling, rhinorrhea, sinus pressure and sinus pain.    Eyes:  Negative for photophobia and visual disturbance.   Respiratory:  Negative for apnea, cough, chest tightness and shortness of breath.

## 2025-02-25 LAB
ALBUMIN: 3.7 G/DL
ALP BLD-CCNC: 87 U/L
ALT SERPL-CCNC: 8 U/L
ANION GAP SERPL CALCULATED.3IONS-SCNC: NORMAL MMOL/L
AST SERPL-CCNC: 12 U/L
BASOPHILS ABSOLUTE: 52 /ΜL
BASOPHILS RELATIVE PERCENT: 0.7 %
BILIRUB SERPL-MCNC: 0.5 MG/DL (ref 0.1–1.4)
BUN BLDV-MCNC: 22 MG/DL
CALCIUM SERPL-MCNC: 9.1 MG/DL
CHLORIDE BLD-SCNC: 104 MMOL/L
CHOLESTEROL, TOTAL: 121 MG/DL
CHOLESTEROL/HDL RATIO: 2.4
CO2: 30 MMOL/L
CREAT SERPL-MCNC: 1.34 MG/DL
EOSINOPHILS ABSOLUTE: 81 /ΜL
EOSINOPHILS RELATIVE PERCENT: 1.1 %
GFR, ESTIMATED: 55
GLUCOSE BLD-MCNC: 97 MG/DL
HCT VFR BLD CALC: 37.2 % (ref 41–53)
HDLC SERPL-MCNC: 50 MG/DL (ref 35–70)
HEMOGLOBIN: 12.1 G/DL (ref 13.5–17.5)
LDL CHOLESTEROL: 57
LYMPHOCYTES ABSOLUTE: 1228 /ΜL
LYMPHOCYTES RELATIVE PERCENT: 16.6 %
MCH RBC QN AUTO: 28.5 PG
MCHC RBC AUTO-ENTMCNC: 32.5 G/DL
MCV RBC AUTO: 87.5 FL
MONOCYTES ABSOLUTE: 548 /ΜL
MONOCYTES RELATIVE PERCENT: 7.4 %
NEUTROPHILS ABSOLUTE: 5491 /ΜL
NEUTROPHILS RELATIVE PERCENT: 74.2 %
NONHDLC SERPL-MCNC: 71 MG/DL
PDW BLD-RTO: 13.2 %
PLATELET # BLD: 210 K/ΜL
PMV BLD AUTO: 11.1 FL
POTASSIUM SERPL-SCNC: 4.9 MMOL/L
RBC # BLD: 4.25 10^6/ΜL
SODIUM BLD-SCNC: 140 MMOL/L
TOTAL PROTEIN: 6.6 G/DL (ref 6.4–8.2)
TRIGL SERPL-MCNC: 62 MG/DL
VLDLC SERPL CALC-MCNC: NORMAL MG/DL
WBC # BLD: 7.4 10^3/ML

## 2025-02-26 DIAGNOSIS — E78.00 PURE HYPERCHOLESTEROLEMIA: ICD-10-CM

## 2025-02-26 DIAGNOSIS — I10 ESSENTIAL HYPERTENSION: ICD-10-CM

## 2025-03-13 LAB — NONINV COLON CA DNA+OCC BLD SCRN STL QL: NEGATIVE

## 2025-03-21 ENCOUNTER — RESULTS FOLLOW-UP (OUTPATIENT)
Dept: FAMILY MEDICINE CLINIC | Age: 75
End: 2025-03-21

## 2025-06-09 SDOH — HEALTH STABILITY: PHYSICAL HEALTH: ON AVERAGE, HOW MANY MINUTES DO YOU ENGAGE IN EXERCISE AT THIS LEVEL?: 0 MIN

## 2025-06-09 SDOH — ECONOMIC STABILITY: TRANSPORTATION INSECURITY
IN THE PAST 12 MONTHS, HAS LACK OF TRANSPORTATION KEPT YOU FROM MEETINGS, WORK, OR FROM GETTING THINGS NEEDED FOR DAILY LIVING?: NO

## 2025-06-09 SDOH — HEALTH STABILITY: PHYSICAL HEALTH: ON AVERAGE, HOW MANY DAYS PER WEEK DO YOU ENGAGE IN MODERATE TO STRENUOUS EXERCISE (LIKE A BRISK WALK)?: 0 DAYS

## 2025-06-09 SDOH — ECONOMIC STABILITY: INCOME INSECURITY: IN THE LAST 12 MONTHS, WAS THERE A TIME WHEN YOU WERE NOT ABLE TO PAY THE MORTGAGE OR RENT ON TIME?: NO

## 2025-06-09 ASSESSMENT — LIFESTYLE VARIABLES
HOW OFTEN DO YOU HAVE A DRINK CONTAINING ALCOHOL: 1
HOW MANY STANDARD DRINKS CONTAINING ALCOHOL DO YOU HAVE ON A TYPICAL DAY: PATIENT DOES NOT DRINK
HOW MANY STANDARD DRINKS CONTAINING ALCOHOL DO YOU HAVE ON A TYPICAL DAY: 0
HOW OFTEN DO YOU HAVE A DRINK CONTAINING ALCOHOL: NEVER
HOW OFTEN DO YOU HAVE SIX OR MORE DRINKS ON ONE OCCASION: 1

## 2025-06-09 ASSESSMENT — PATIENT HEALTH QUESTIONNAIRE - PHQ9
SUM OF ALL RESPONSES TO PHQ QUESTIONS 1-9: 0
1. LITTLE INTEREST OR PLEASURE IN DOING THINGS: NOT AT ALL
SUM OF ALL RESPONSES TO PHQ QUESTIONS 1-9: 0
2. FEELING DOWN, DEPRESSED OR HOPELESS: NOT AT ALL

## 2025-06-12 ENCOUNTER — OFFICE VISIT (OUTPATIENT)
Dept: FAMILY MEDICINE CLINIC | Age: 75
End: 2025-06-12
Payer: MEDICARE

## 2025-06-12 VITALS
TEMPERATURE: 97.5 F | DIASTOLIC BLOOD PRESSURE: 84 MMHG | HEIGHT: 65 IN | HEART RATE: 53 BPM | OXYGEN SATURATION: 97 % | SYSTOLIC BLOOD PRESSURE: 114 MMHG | RESPIRATION RATE: 18 BRPM | BODY MASS INDEX: 22.33 KG/M2 | WEIGHT: 134 LBS

## 2025-06-12 DIAGNOSIS — Z87.891 PERSONAL HISTORY OF TOBACCO USE: ICD-10-CM

## 2025-06-12 DIAGNOSIS — E55.9 VITAMIN D DEFICIENCY: ICD-10-CM

## 2025-06-12 DIAGNOSIS — I25.10 CORONARY ARTERY DISEASE INVOLVING NATIVE CORONARY ARTERY OF NATIVE HEART WITHOUT ANGINA PECTORIS: ICD-10-CM

## 2025-06-12 DIAGNOSIS — I10 ESSENTIAL HYPERTENSION: ICD-10-CM

## 2025-06-12 DIAGNOSIS — J43.9 PULMONARY EMPHYSEMA, UNSPECIFIED EMPHYSEMA TYPE (HCC): Primary | ICD-10-CM

## 2025-06-12 DIAGNOSIS — Z00.00 MEDICARE ANNUAL WELLNESS VISIT, SUBSEQUENT: Primary | ICD-10-CM

## 2025-06-12 PROCEDURE — 99214 OFFICE O/P EST MOD 30 MIN: CPT | Performed by: FAMILY MEDICINE

## 2025-06-12 PROCEDURE — G0439 PPPS, SUBSEQ VISIT: HCPCS | Performed by: FAMILY MEDICINE

## 2025-06-12 PROCEDURE — 3079F DIAST BP 80-89 MM HG: CPT | Performed by: FAMILY MEDICINE

## 2025-06-12 PROCEDURE — 1123F ACP DISCUSS/DSCN MKR DOCD: CPT | Performed by: FAMILY MEDICINE

## 2025-06-12 PROCEDURE — G0296 VISIT TO DETERM LDCT ELIG: HCPCS | Performed by: FAMILY MEDICINE

## 2025-06-12 PROCEDURE — 3074F SYST BP LT 130 MM HG: CPT | Performed by: FAMILY MEDICINE

## 2025-06-12 PROCEDURE — 1159F MED LIST DOCD IN RCRD: CPT | Performed by: FAMILY MEDICINE

## 2025-06-12 RX ORDER — ERGOCALCIFEROL 1.25 MG/1
50000 CAPSULE, LIQUID FILLED ORAL
Qty: 6 CAPSULE | Refills: 1 | Status: SHIPPED | OUTPATIENT
Start: 2025-06-12

## 2025-06-12 RX ORDER — BUDESONIDE, GLYCOPYRROLATE, AND FORMOTEROL FUMARATE 160; 9; 4.8 UG/1; UG/1; UG/1
2 AEROSOL, METERED RESPIRATORY (INHALATION)
Qty: 1 EACH | Refills: 1 | Status: SHIPPED | OUTPATIENT
Start: 2025-06-12 | End: 2025-06-13

## 2025-06-12 RX ORDER — RAMIPRIL 10 MG/1
10 CAPSULE ORAL NIGHTLY
Qty: 90 CAPSULE | Refills: 1 | Status: SHIPPED | OUTPATIENT
Start: 2025-06-12

## 2025-06-12 RX ORDER — METOPROLOL SUCCINATE 50 MG/1
50 TABLET, EXTENDED RELEASE ORAL 2 TIMES DAILY
Qty: 180 TABLET | Refills: 1 | Status: SHIPPED | OUTPATIENT
Start: 2025-06-12 | End: 2025-12-09

## 2025-06-12 NOTE — PROGRESS NOTES
Gordon Quinn is a 74 y.o. male   CC:   Chief Complaint   Patient presents with    4 mo       History of Present Illness  The patient presents for evaluation of severe COPD.    COPD  He reports intermittent respiratory distress, which is partially alleviated by his current medication. However, the relief provided by the medication is short-lived, lasting approximately one hour. The severity of his symptoms fluctuates, with some days being more challenging than others, while on other days, he experiences minimal discomfort. He has not previously consulted with a pulmonologist.  - Onset: Intermittent respiratory distress.  - Duration: Relief from medication lasts approximately one hour.  - Character: Respiratory distress.  - Alleviating Factors: Current medication partially alleviates symptoms.  - Severity: Fluctuates; some days are more challenging than others.    Cataract Surgery  He has been informed by his ophthalmologist that he will not be able to pass a 's test for license renewal next year without undergoing cataract surgery. He has a scheduled appointment with his ophthalmologist on 06/24/2025.    Patient's past medical, surgical, social and/or family history reviewed, updated in chart, and are non-contributory (unless otherwise stated).  Medications and allergies also reviewed and updated in chart.      Results      /84   Pulse 53   Temp 97.5 °F (36.4 °C) (Temporal)   Resp 18   Ht 1.651 m (5' 5\")   Wt 60.8 kg (134 lb)   SpO2 97%   BMI 22.30 kg/m²     Review of Systems   Constitutional:  Negative for chills, fatigue and fever.   HENT:  Negative for congestion, ear pain, facial swelling, rhinorrhea, sinus pressure and sinus pain.    Eyes:  Negative for photophobia and visual disturbance.   Respiratory:  Negative for apnea, cough, chest tightness and shortness of breath.    Cardiovascular:  Negative for chest pain and palpitations.   Gastrointestinal:  Negative for abdominal distention,

## 2025-06-13 RX ORDER — BUDESONIDE, GLYCOPYRROLATE, AND FORMOTEROL FUMARATE 160; 9; 4.8 UG/1; UG/1; UG/1
2 AEROSOL, METERED RESPIRATORY (INHALATION) 2 TIMES DAILY
Qty: 11 G | Refills: 1 | Status: SHIPPED | OUTPATIENT
Start: 2025-06-13

## 2025-06-13 NOTE — TELEPHONE ENCOUNTER
Pharmacy comment: Please clarify the directions  for this prescription. Is this supposed to be twice a day?

## 2025-06-19 NOTE — PROGRESS NOTES
Medicare Annual Wellness Visit    Gordon Quinn is here for Medicare AWV    Assessment & Plan   Medicare annual wellness visit, subsequent  Personal history of tobacco use  -     WA VISIT TO DISCUSS LUNG CA SCREEN W LDCT  -     CT Lung Screen (Initial/Annual/Baseline); Future     Return for Medicare Annual Wellness Visit in 1 year.     Subjective       Patient's complete Health Risk Assessment and screening values have been reviewed and are found in Flowsheets. The following problems were reviewed today and where indicated follow up appointments were made and/or referrals ordered.    Positive Risk Factor Screenings with Interventions:              Inactivity:  On average, how many days per week do you engage in moderate to strenuous exercise (like a brisk walk)?: (Patient-Rptd) 0 days (!) Abnormal  On average, how many minutes do you engage in exercise at this level?: (Patient-Rptd) 0 min  Interventions:  Patient advised to follow up in the office for further evaluation and treatment      Dentist Screen:  Have you seen the dentist within the past year?: (!) (Patient-Rptd) No    Intervention:  Patient declines any further evaluation or treatment      Safety:  Do you have any tripping hazards - loose or unsecured carpets or rugs?: (!) (Patient-Rptd) Yes  Interventions:  Patient advised to follow up in the office for further evaluation and treatment     Advanced Directives:  Do you have a Living Will?: (!) (Patient-Rptd) No    Intervention:  has NO advanced directive - information provided        Tobacco Use:    Tobacco Use      Smoking status: Every Day        Packs/day: 1.00        Years: 1 pack/day for 57.5 years (57.5 ttl pk-yrs)        Types: Cigarettes        Start date: 1968      Smokeless tobacco: Never      Tobacco comments: Does not want to quit.      Interventions:  Patient advised to follow up in the office for further evalution and treatment                      Objective   There were no vitals filed for

## 2025-06-19 NOTE — PATIENT INSTRUCTIONS
Examples include spinach, carrots, peaches, and berries.     Foods high in fiber can reduce your cholesterol and provide important vitamins and minerals. High-fiber foods include whole-grain cereals and breads, oatmeal, beans, brown rice, citrus fruits, and apples.     Eat lean proteins. Heart-healthy proteins include seafood, lean meats and poultry, eggs, beans, peas, nuts, seeds, and soy products.     Limit drinks and foods with added sugar. These include candy, desserts, and soda pop.   Heart-healthy lifestyle    If your doctor recommends it, get more exercise. For many people, walking is a good choice. Or you may want to swim, bike, or do other activities. Bit by bit, increase the time you're active every day. Try for at least 30 minutes on most days of the week.     Try to quit or cut back on using tobacco and other nicotine products. This includes smoking and vaping. If you need help quitting, talk to your doctor about stop-smoking programs and medicines. These can increase your chances of quitting for good. Quitting is one of the most important things you can do to protect your heart. It is never too late to quit. Try to avoid secondhand smoke too.     Stay at a weight that's healthy for you. Talk to your doctor if you need help losing weight.     Try to get 7 to 9 hours of sleep each night.     Limit alcohol to 2 drinks a day for men and 1 drink a day for women. Too much alcohol can cause health problems.     Manage other health problems such as diabetes, high blood pressure, and high cholesterol. If you think you may have a problem with alcohol or drug use, talk to your doctor.   Medicines    Take your medicines exactly as prescribed. Call your doctor if you think you are having a problem with your medicine.     If your doctor recommends aspirin, take the amount directed each day. Make sure you take aspirin and not another kind of pain reliever, such as acetaminophen (Tylenol).   When should you call for

## 2025-08-07 ENCOUNTER — TELEPHONE (OUTPATIENT)
Dept: FAMILY MEDICINE CLINIC | Age: 75
End: 2025-08-07

## 2025-09-04 ENCOUNTER — TELEPHONE (OUTPATIENT)
Dept: FAMILY MEDICINE CLINIC | Age: 75
End: 2025-09-04

## 2025-09-05 RX ORDER — BUDESONIDE, GLYCOPYRROLATE, AND FORMOTEROL FUMARATE 160; 9; 4.8 UG/1; UG/1; UG/1
AEROSOL, METERED RESPIRATORY (INHALATION)
Qty: 11 G | Refills: 0 | OUTPATIENT
Start: 2025-09-05

## (undated) DEVICE — CATHETER URETH 22FR L16IN LTX INTMIT ROB MOD BARDX

## (undated) DEVICE — Z DISCONTINUED PER MEDLINE USE 2425483 TAPE UMB L30IN DIA1/8IN WHT COT NONABSORBABLE W/O NDL FOR

## (undated) DEVICE — SHEET, T, LAPAROTOMY, STERILE: Brand: MEDLINE

## (undated) DEVICE — STANDARD HYPODERMIC NEEDLE,POLYPROPYLENE HUB: Brand: MONOJECT

## (undated) DEVICE — CATHETER ETER IV 20GA L1IN POLYUR STR RADPQ INTROCAN SFTY

## (undated) DEVICE — CAROTID ARTERY SHUNT KIT,RADIOPAQUE LINE, STRAIGHT: Brand: ARGYLE

## (undated) DEVICE — SOLUTION IV IRRIG POUR BRL 0.9% SODIUM CHL 2F7124

## (undated) DEVICE — CLAMP INSERT: Brand: STEALTH® CLAMP INSERT

## (undated) DEVICE — SET SURG BASIN MAYO REUSABLE

## (undated) DEVICE — DOUBLE BASIN SET: Brand: MEDLINE INDUSTRIES, INC.

## (undated) DEVICE — TOTAL TRAY, 16FR 10ML SIL FOLEY, URN: Brand: MEDLINE

## (undated) DEVICE — ELECTRODE PT RET AD L9FT HI MOIST COND ADH HYDRGEL CORDED

## (undated) DEVICE — SKIN AFFIX SURG ADHESIVE 72/CS 0.55ML: Brand: MEDLINE

## (undated) DEVICE — SOLUTION IV IRRIG WATER 1000ML POUR BRL 2F7114

## (undated) DEVICE — NEEDLE HYPO 26GA L0.625IN TAN POLYPR HUB S STL REG BVL STR

## (undated) DEVICE — 3M™ IOBAN™ 2 ANTIMICROBIAL INCISE DRAPE 6640EZ: Brand: IOBAN™ 2

## (undated) DEVICE — GOWN,SIRUS,FABRNF,L,20/CS: Brand: MEDLINE

## (undated) DEVICE — GLOVE SURG SZ 75 STD WHT LTX SYN POLYMER BEAD REINF ANTI RL

## (undated) DEVICE — GOWN,SIRUS,FABRNF,XL,20/CS: Brand: MEDLINE

## (undated) DEVICE — SOLUTION IV 500ML 0.9% SOD CHL INJ USP CONT EXCEL

## (undated) DEVICE — BLADE CLIPPER GEN PURP NS

## (undated) DEVICE — SURGICAL PROCEDURE PACK VASC MAJ CUST

## (undated) DEVICE — 1.5L THIN WALL CAN: Brand: CRD

## (undated) DEVICE — TOWEL,OR,DSP,ST,BLUE,STD,6/PK,12PK/CS: Brand: MEDLINE

## (undated) DEVICE — SET SURG INSTR ART III

## (undated) DEVICE — LOOP VES W25MM THK1MM MAXI RED SIL FLD REPELLENT 100 PER

## (undated) DEVICE — LABEL MED 4 IN SURG PANEL W/ PEN STRL

## (undated) DEVICE — SET INSTR ART 1

## (undated) DEVICE — GOWN,AURORA,NONREINF,RAGLAN,L,STERILE: Brand: MEDLINE

## (undated) DEVICE — SYRINGE MED 10ML LUERLOCK TIP W/O SFTY DISP